# Patient Record
Sex: MALE | Race: WHITE | Employment: FULL TIME | URBAN - METROPOLITAN AREA
[De-identification: names, ages, dates, MRNs, and addresses within clinical notes are randomized per-mention and may not be internally consistent; named-entity substitution may affect disease eponyms.]

---

## 2023-06-08 ENCOUNTER — HOSPITAL ENCOUNTER (EMERGENCY)
Age: 48
Discharge: HOME | End: 2023-06-08
Attending: EMERGENCY MEDICINE
Payer: COMMERCIAL

## 2023-06-08 ENCOUNTER — APPOINTMENT (OUTPATIENT)
Dept: GENERAL RADIOLOGY | Age: 48
End: 2023-06-08
Payer: COMMERCIAL

## 2023-06-08 VITALS
SYSTOLIC BLOOD PRESSURE: 140 MMHG | HEART RATE: 80 BPM | OXYGEN SATURATION: 97 % | RESPIRATION RATE: 22 BRPM | TEMPERATURE: 98.6 F | BODY MASS INDEX: 30.36 KG/M2 | HEIGHT: 66 IN | WEIGHT: 188.93 LBS | DIASTOLIC BLOOD PRESSURE: 89 MMHG

## 2023-06-08 DIAGNOSIS — R73.9 HYPERGLYCEMIA: Primary | ICD-10-CM

## 2023-06-08 DIAGNOSIS — R42 LIGHTHEADEDNESS: ICD-10-CM

## 2023-06-08 LAB
ALBUMIN SERPL-MCNC: 4.3 G/DL (ref 3.4–5)
ALBUMIN/GLOB SERPL: 1.3 {RATIO} (ref 1.1–2.2)
ALP SERPL-CCNC: 213 U/L (ref 40–129)
ALT SERPL-CCNC: 15 U/L (ref 10–40)
ANION GAP SERPL CALCULATED.3IONS-SCNC: 15 MMOL/L (ref 3–16)
AST SERPL-CCNC: 10 U/L (ref 15–37)
BASE EXCESS BLDV CALC-SCNC: 3.7 MMOL/L (ref -3–3)
BASOPHILS # BLD: 0 K/UL (ref 0–0.2)
BASOPHILS NFR BLD: 0.4 %
BILIRUB SERPL-MCNC: 0.3 MG/DL (ref 0–1)
BILIRUB UR QL STRIP.AUTO: NEGATIVE
BUN SERPL-MCNC: 13 MG/DL (ref 7–20)
CALCIUM SERPL-MCNC: 9 MG/DL (ref 8.3–10.6)
CHLORIDE SERPL-SCNC: 97 MMOL/L (ref 99–110)
CHP ED QC CHECK: YES
CLARITY UR: CLEAR
CO2 BLDV-SCNC: 31 MMOL/L
CO2 SERPL-SCNC: 26 MMOL/L (ref 21–32)
COLOR UR: YELLOW
CREAT SERPL-MCNC: 0.7 MG/DL (ref 0.9–1.3)
DEPRECATED RDW RBC AUTO: 13.3 % (ref 12.4–15.4)
EKG ATRIAL RATE: 74 BPM
EKG DIAGNOSIS: NORMAL
EKG P AXIS: 26 DEGREES
EKG P-R INTERVAL: 142 MS
EKG Q-T INTERVAL: 402 MS
EKG QRS DURATION: 90 MS
EKG QTC CALCULATION (BAZETT): 446 MS
EKG R AXIS: -27 DEGREES
EKG T AXIS: 16 DEGREES
EKG VENTRICULAR RATE: 74 BPM
EOSINOPHIL # BLD: 0.1 K/UL (ref 0–0.6)
EOSINOPHIL NFR BLD: 1.2 %
GFR SERPLBLD CREATININE-BSD FMLA CKD-EPI: >60 ML/MIN/{1.73_M2}
GLUCOSE BLD-MCNC: 200 MG/DL
GLUCOSE BLD-MCNC: 200 MG/DL (ref 70–99)
GLUCOSE BLD-MCNC: 289 MG/DL (ref 70–99)
GLUCOSE SERPL-MCNC: 315 MG/DL (ref 70–99)
GLUCOSE UR STRIP.AUTO-MCNC: >=1000 MG/DL
HCO3 BLDV-SCNC: 29.1 MMOL/L (ref 23–29)
HCT VFR BLD AUTO: 44.3 % (ref 40.5–52.5)
HGB BLD-MCNC: 15.4 G/DL (ref 13.5–17.5)
HGB UR QL STRIP.AUTO: NEGATIVE
KETONES UR STRIP.AUTO-MCNC: 15 MG/DL
LEUKOCYTE ESTERASE UR QL STRIP.AUTO: NEGATIVE
LIPASE SERPL-CCNC: 25 U/L (ref 13–60)
LYMPHOCYTES # BLD: 2.7 K/UL (ref 1–5.1)
LYMPHOCYTES NFR BLD: 24.7 %
MCH RBC QN AUTO: 30.1 PG (ref 26–34)
MCHC RBC AUTO-ENTMCNC: 34.8 G/DL (ref 31–36)
MCV RBC AUTO: 86.3 FL (ref 80–100)
MONOCYTES # BLD: 0.7 K/UL (ref 0–1.3)
MONOCYTES NFR BLD: 6.7 %
NEUTROPHILS # BLD: 7.3 K/UL (ref 1.7–7.7)
NEUTROPHILS NFR BLD: 67 %
NITRITE UR QL STRIP.AUTO: NEGATIVE
O2 THERAPY: ABNORMAL
PCO2 BLDV: 51.4 MMHG (ref 40–50)
PERFORMED ON: ABNORMAL
PERFORMED ON: ABNORMAL
PH BLDV: 7.36 [PH] (ref 7.35–7.45)
PH UR STRIP.AUTO: 6 [PH] (ref 5–8)
PLATELET # BLD AUTO: 227 K/UL (ref 135–450)
PMV BLD AUTO: 9.2 FL (ref 5–10.5)
PO2 BLDV: 36.3 MMHG (ref 25–40)
POTASSIUM SERPL-SCNC: 3.6 MMOL/L (ref 3.5–5.1)
PROT SERPL-MCNC: 7.6 G/DL (ref 6.4–8.2)
PROT UR STRIP.AUTO-MCNC: NEGATIVE MG/DL
RBC # BLD AUTO: 5.13 M/UL (ref 4.2–5.9)
SAO2 % BLDV: 66 %
SODIUM SERPL-SCNC: 138 MMOL/L (ref 136–145)
SP GR UR STRIP.AUTO: 1.02 (ref 1–1.03)
TROPONIN, HIGH SENSITIVITY: <6 NG/L (ref 0–22)
UA COMPLETE W REFLEX CULTURE PNL UR: ABNORMAL
UA DIPSTICK W REFLEX MICRO PNL UR: ABNORMAL
URN SPEC COLLECT METH UR: ABNORMAL
UROBILINOGEN UR STRIP-ACNC: 4 E.U./DL
WBC # BLD AUTO: 10.8 K/UL (ref 4–11)

## 2023-06-08 PROCEDURE — 81003 URINALYSIS AUTO W/O SCOPE: CPT

## 2023-06-08 PROCEDURE — 36415 COLL VENOUS BLD VENIPUNCTURE: CPT

## 2023-06-08 PROCEDURE — 2580000003 HC RX 258: Performed by: EMERGENCY MEDICINE

## 2023-06-08 PROCEDURE — 93010 ELECTROCARDIOGRAM REPORT: CPT | Performed by: INTERNAL MEDICINE

## 2023-06-08 PROCEDURE — 80053 COMPREHEN METABOLIC PANEL: CPT

## 2023-06-08 PROCEDURE — 85025 COMPLETE CBC W/AUTO DIFF WBC: CPT

## 2023-06-08 PROCEDURE — 93005 ELECTROCARDIOGRAM TRACING: CPT | Performed by: EMERGENCY MEDICINE

## 2023-06-08 PROCEDURE — 6370000000 HC RX 637 (ALT 250 FOR IP): Performed by: EMERGENCY MEDICINE

## 2023-06-08 PROCEDURE — 84484 ASSAY OF TROPONIN QUANT: CPT

## 2023-06-08 PROCEDURE — 82803 BLOOD GASES ANY COMBINATION: CPT

## 2023-06-08 PROCEDURE — 83690 ASSAY OF LIPASE: CPT

## 2023-06-08 PROCEDURE — 71045 X-RAY EXAM CHEST 1 VIEW: CPT

## 2023-06-08 RX ORDER — 0.9 % SODIUM CHLORIDE 0.9 %
1000 INTRAVENOUS SOLUTION INTRAVENOUS ONCE
Status: COMPLETED | OUTPATIENT
Start: 2023-06-08 | End: 2023-06-08

## 2023-06-08 RX ORDER — 0.9 % SODIUM CHLORIDE 0.9 %
500 INTRAVENOUS SOLUTION INTRAVENOUS ONCE
Status: COMPLETED | OUTPATIENT
Start: 2023-06-08 | End: 2023-06-08

## 2023-06-08 RX ADMIN — INSULIN HUMAN 10 UNITS: 100 INJECTION, SOLUTION PARENTERAL at 12:21

## 2023-06-08 RX ADMIN — SODIUM CHLORIDE 500 ML: 9 INJECTION, SOLUTION INTRAVENOUS at 12:21

## 2023-06-08 RX ADMIN — SODIUM CHLORIDE 1000 ML: 9 INJECTION, SOLUTION INTRAVENOUS at 11:36

## 2023-06-08 ASSESSMENT — ENCOUNTER SYMPTOMS
VOMITING: 0
BLOOD IN STOOL: 0
BACK PAIN: 0
ABDOMINAL PAIN: 0
TROUBLE SWALLOWING: 0
STRIDOR: 0
WHEEZING: 0
NAUSEA: 0
SHORTNESS OF BREATH: 0
VOICE CHANGE: 0
PHOTOPHOBIA: 0
FACIAL SWELLING: 0
COLOR CHANGE: 0

## 2023-06-08 ASSESSMENT — PAIN SCALES - GENERAL: PAINLEVEL_OUTOF10: 0

## 2023-06-08 ASSESSMENT — PAIN - FUNCTIONAL ASSESSMENT: PAIN_FUNCTIONAL_ASSESSMENT: NONE - DENIES PAIN

## 2023-06-08 NOTE — ED TRIAGE NOTES
Patient presents to ED complaining of intermittent room spinning sensation x3 days. Denies numbness or weakness to extremities, denies known cardiac hx. Reports a couple of instances of feeling his heart racing over the past several months. Denies chest pain. Patient also concerned for high blood glucose, denies personal hx of diabetes, states he did check his glucose with a coworkers glucometer today and states it read over 300. Denies having anything but water since last night. NIHSS = 0 on arrival.    Patient resting on bed, respirations even and easy at this time. No obvious distress.

## 2023-06-08 NOTE — ED PROVIDER NOTES
symptoms. Specific return precautions given for any neurologic deficits such as numbness weakness or difficulty walking. Patient expresses understanding and agreement with this plan and is discharged home. FINAL IMPRESSION      1. Hyperglycemia    2. Lightheadedness          DISPOSITION/PLAN     DISPOSITION Decision To Discharge 06/08/2023 01:59:03 PM      PATIENT REFERRED TO:  Kianna Mcmillan  [phone]  In 3 days  Ask for an appointment with a primary care doctor    [place] [address]  [phone]    If symptoms worsen      DISCHARGE MEDICATIONS:  Discharge Medication List as of 6/8/2023  2:16 PM        START taking these medications    Details   metFORMIN (GLUCOPHAGE) 500 MG tablet Take 1 tablet by mouth daily (with breakfast), Disp-14 tablet, R-0Normal                    (Please note that portions of this note were completed with a voice recognition program.  Efforts were made to edit the dictations but occasionally words are mis-transcribed. )    Dann Ledesma MD (electronically signed)  Attending Emergency Physician           Dann Ledesma MD  06/08/23 [id number]

## 2023-06-08 NOTE — ED NOTES
Patient ambulatory from ED. AVS provided and discussed with patient. All questions answered. Patient verbalizes understanding of discharge instructions. Respirations even and easy. No obvious distress at this time.      Netta Mullen RN  06/08/23 [id number]

## 2024-07-14 ENCOUNTER — HOSPITAL ENCOUNTER (INPATIENT)
Age: 49
LOS: 1 days | Discharge: ANOTHER HOSPITAL | DRG: 251 | End: 2024-07-15
Attending: STUDENT IN AN ORGANIZED HEALTH CARE EDUCATION/TRAINING PROGRAM | Admitting: STUDENT IN AN ORGANIZED HEALTH CARE EDUCATION/TRAINING PROGRAM
Payer: COMMERCIAL

## 2024-07-14 ENCOUNTER — APPOINTMENT (OUTPATIENT)
Dept: GENERAL RADIOLOGY | Age: 49
DRG: 251 | End: 2024-07-14
Payer: COMMERCIAL

## 2024-07-14 DIAGNOSIS — R73.9 HYPERGLYCEMIA: ICD-10-CM

## 2024-07-14 DIAGNOSIS — R07.9 CHEST PAIN, UNSPECIFIED TYPE: Primary | ICD-10-CM

## 2024-07-14 DIAGNOSIS — I21.4 NSTEMI (NON-ST ELEVATED MYOCARDIAL INFARCTION) (HCC): ICD-10-CM

## 2024-07-14 DIAGNOSIS — R79.89 ELEVATED TROPONIN: ICD-10-CM

## 2024-07-14 LAB
ALBUMIN SERPL-MCNC: 4.6 G/DL (ref 3.4–5)
ALBUMIN/GLOB SERPL: 1.5 {RATIO} (ref 1.1–2.2)
ALP SERPL-CCNC: 182 U/L (ref 40–129)
ALT SERPL-CCNC: 15 U/L (ref 10–40)
ANION GAP SERPL CALCULATED.3IONS-SCNC: 16 MMOL/L (ref 3–16)
APTT BLD: 24.2 SEC (ref 22.1–36.4)
AST SERPL-CCNC: 12 U/L (ref 15–37)
BASOPHILS # BLD: 0 K/UL (ref 0–0.2)
BASOPHILS NFR BLD: 0.4 %
BILIRUB SERPL-MCNC: 0.4 MG/DL (ref 0–1)
BUN SERPL-MCNC: 17 MG/DL (ref 7–20)
CALCIUM SERPL-MCNC: 9.6 MG/DL (ref 8.3–10.6)
CHLORIDE SERPL-SCNC: 96 MMOL/L (ref 99–110)
CO2 SERPL-SCNC: 23 MMOL/L (ref 21–32)
CREAT SERPL-MCNC: 0.9 MG/DL (ref 0.9–1.3)
D-DIMER QUANTITATIVE: 0.28 UG/ML FEU (ref 0–0.6)
DEPRECATED RDW RBC AUTO: 13.7 % (ref 12.4–15.4)
EOSINOPHIL # BLD: 0.1 K/UL (ref 0–0.6)
EOSINOPHIL NFR BLD: 0.4 %
GFR SERPLBLD CREATININE-BSD FMLA CKD-EPI: >90 ML/MIN/{1.73_M2}
GLUCOSE BLD-MCNC: 300 MG/DL (ref 70–99)
GLUCOSE SERPL-MCNC: 380 MG/DL (ref 70–99)
HCT VFR BLD AUTO: 45.8 % (ref 40.5–52.5)
HGB BLD-MCNC: 15.7 G/DL (ref 13.5–17.5)
LYMPHOCYTES # BLD: 2.3 K/UL (ref 1–5.1)
LYMPHOCYTES NFR BLD: 20.3 %
MCH RBC QN AUTO: 29.3 PG (ref 26–34)
MCHC RBC AUTO-ENTMCNC: 34.3 G/DL (ref 31–36)
MCV RBC AUTO: 85.6 FL (ref 80–100)
MONOCYTES # BLD: 0.7 K/UL (ref 0–1.3)
MONOCYTES NFR BLD: 6.1 %
NEUTROPHILS # BLD: 8.4 K/UL (ref 1.7–7.7)
NEUTROPHILS NFR BLD: 72.8 %
PERFORMED ON: ABNORMAL
PLATELET # BLD AUTO: 228 K/UL (ref 135–450)
PMV BLD AUTO: 9.8 FL (ref 5–10.5)
POTASSIUM SERPL-SCNC: 4.7 MMOL/L (ref 3.5–5.1)
PROT SERPL-MCNC: 7.7 G/DL (ref 6.4–8.2)
RBC # BLD AUTO: 5.36 M/UL (ref 4.2–5.9)
SODIUM SERPL-SCNC: 135 MMOL/L (ref 136–145)
TROPONIN, HIGH SENSITIVITY: 20 NG/L (ref 0–22)
TROPONIN, HIGH SENSITIVITY: 26 NG/L (ref 0–22)
TROPONIN, HIGH SENSITIVITY: 31 NG/L (ref 0–22)
TROPONIN, HIGH SENSITIVITY: 49 NG/L (ref 0–22)
TROPONIN, HIGH SENSITIVITY: 74 NG/L (ref 0–22)
WBC # BLD AUTO: 11.5 K/UL (ref 4–11)

## 2024-07-14 PROCEDURE — 6360000002 HC RX W HCPCS: Performed by: PHYSICIAN ASSISTANT

## 2024-07-14 PROCEDURE — 02703ZZ DILATION OF CORONARY ARTERY, ONE ARTERY, PERCUTANEOUS APPROACH: ICD-10-PCS | Performed by: STUDENT IN AN ORGANIZED HEALTH CARE EDUCATION/TRAINING PROGRAM

## 2024-07-14 PROCEDURE — 93005 ELECTROCARDIOGRAM TRACING: CPT | Performed by: STUDENT IN AN ORGANIZED HEALTH CARE EDUCATION/TRAINING PROGRAM

## 2024-07-14 PROCEDURE — 71045 X-RAY EXAM CHEST 1 VIEW: CPT

## 2024-07-14 PROCEDURE — 99285 EMERGENCY DEPT VISIT HI MDM: CPT

## 2024-07-14 PROCEDURE — 6370000000 HC RX 637 (ALT 250 FOR IP): Performed by: STUDENT IN AN ORGANIZED HEALTH CARE EDUCATION/TRAINING PROGRAM

## 2024-07-14 PROCEDURE — 84484 ASSAY OF TROPONIN QUANT: CPT

## 2024-07-14 PROCEDURE — 80053 COMPREHEN METABOLIC PANEL: CPT

## 2024-07-14 PROCEDURE — 93005 ELECTROCARDIOGRAM TRACING: CPT | Performed by: PHYSICIAN ASSISTANT

## 2024-07-14 PROCEDURE — 85730 THROMBOPLASTIN TIME PARTIAL: CPT

## 2024-07-14 PROCEDURE — B2111ZZ FLUOROSCOPY OF MULTIPLE CORONARY ARTERIES USING LOW OSMOLAR CONTRAST: ICD-10-PCS | Performed by: STUDENT IN AN ORGANIZED HEALTH CARE EDUCATION/TRAINING PROGRAM

## 2024-07-14 PROCEDURE — 4A023N7 MEASUREMENT OF CARDIAC SAMPLING AND PRESSURE, LEFT HEART, PERCUTANEOUS APPROACH: ICD-10-PCS | Performed by: STUDENT IN AN ORGANIZED HEALTH CARE EDUCATION/TRAINING PROGRAM

## 2024-07-14 PROCEDURE — B2151ZZ FLUOROSCOPY OF LEFT HEART USING LOW OSMOLAR CONTRAST: ICD-10-PCS | Performed by: STUDENT IN AN ORGANIZED HEALTH CARE EDUCATION/TRAINING PROGRAM

## 2024-07-14 PROCEDURE — 85379 FIBRIN DEGRADATION QUANT: CPT

## 2024-07-14 PROCEDURE — 85025 COMPLETE CBC W/AUTO DIFF WBC: CPT

## 2024-07-14 PROCEDURE — 2060000000 HC ICU INTERMEDIATE R&B

## 2024-07-14 PROCEDURE — 96374 THER/PROPH/DIAG INJ IV PUSH: CPT

## 2024-07-14 PROCEDURE — 36415 COLL VENOUS BLD VENIPUNCTURE: CPT

## 2024-07-14 RX ORDER — POTASSIUM CHLORIDE 20 MEQ/1
40 TABLET, EXTENDED RELEASE ORAL PRN
Status: DISCONTINUED | OUTPATIENT
Start: 2024-07-14 | End: 2024-07-15 | Stop reason: HOSPADM

## 2024-07-14 RX ORDER — HEPARIN SODIUM 1000 [USP'U]/ML
2000 INJECTION, SOLUTION INTRAVENOUS; SUBCUTANEOUS PRN
Status: DISCONTINUED | OUTPATIENT
Start: 2024-07-14 | End: 2024-07-14 | Stop reason: CLARIF

## 2024-07-14 RX ORDER — EMPAGLIFLOZIN 10 MG/1
10 TABLET, FILM COATED ORAL EVERY MORNING
Status: ON HOLD | COMMUNITY

## 2024-07-14 RX ORDER — ATORVASTATIN CALCIUM 80 MG/1
80 TABLET, FILM COATED ORAL DAILY
Status: DISCONTINUED | OUTPATIENT
Start: 2024-07-14 | End: 2024-07-15 | Stop reason: HOSPADM

## 2024-07-14 RX ORDER — ACETAMINOPHEN 325 MG/1
650 TABLET ORAL EVERY 6 HOURS PRN
Status: DISCONTINUED | OUTPATIENT
Start: 2024-07-14 | End: 2024-07-15 | Stop reason: HOSPADM

## 2024-07-14 RX ORDER — POLYETHYLENE GLYCOL 3350 17 G/17G
17 POWDER, FOR SOLUTION ORAL DAILY PRN
Status: DISCONTINUED | OUTPATIENT
Start: 2024-07-14 | End: 2024-07-15 | Stop reason: HOSPADM

## 2024-07-14 RX ORDER — ASPIRIN 81 MG/1
81 TABLET, CHEWABLE ORAL DAILY
Status: DISCONTINUED | OUTPATIENT
Start: 2024-07-15 | End: 2024-07-15 | Stop reason: HOSPADM

## 2024-07-14 RX ORDER — HEPARIN SODIUM 1000 [USP'U]/ML
4000 INJECTION, SOLUTION INTRAVENOUS; SUBCUTANEOUS PRN
Status: DISCONTINUED | OUTPATIENT
Start: 2024-07-14 | End: 2024-07-14 | Stop reason: CLARIF

## 2024-07-14 RX ORDER — HEPARIN SODIUM 10000 [USP'U]/100ML
0-4000 INJECTION, SOLUTION INTRAVENOUS CONTINUOUS
Status: DISCONTINUED | OUTPATIENT
Start: 2024-07-14 | End: 2024-07-15 | Stop reason: HOSPADM

## 2024-07-14 RX ORDER — HEPARIN SODIUM 1000 [USP'U]/ML
4000 INJECTION, SOLUTION INTRAVENOUS; SUBCUTANEOUS ONCE
Status: COMPLETED | OUTPATIENT
Start: 2024-07-14 | End: 2024-07-14

## 2024-07-14 RX ORDER — DEXTROSE MONOHYDRATE 100 MG/ML
INJECTION, SOLUTION INTRAVENOUS CONTINUOUS PRN
Status: DISCONTINUED | OUTPATIENT
Start: 2024-07-14 | End: 2024-07-15 | Stop reason: HOSPADM

## 2024-07-14 RX ORDER — ACETAMINOPHEN 650 MG/1
650 SUPPOSITORY RECTAL EVERY 6 HOURS PRN
Status: DISCONTINUED | OUTPATIENT
Start: 2024-07-14 | End: 2024-07-15 | Stop reason: HOSPADM

## 2024-07-14 RX ORDER — INSULIN LISPRO 100 [IU]/ML
0-4 INJECTION, SOLUTION INTRAVENOUS; SUBCUTANEOUS NIGHTLY
Status: DISCONTINUED | OUTPATIENT
Start: 2024-07-14 | End: 2024-07-15 | Stop reason: HOSPADM

## 2024-07-14 RX ORDER — ATORVASTATIN CALCIUM 80 MG/1
1 TABLET, FILM COATED ORAL DAILY
Status: ON HOLD | COMMUNITY
Start: 2024-04-15

## 2024-07-14 RX ORDER — SODIUM CHLORIDE 9 MG/ML
INJECTION, SOLUTION INTRAVENOUS PRN
Status: DISCONTINUED | OUTPATIENT
Start: 2024-07-14 | End: 2024-07-15 | Stop reason: HOSPADM

## 2024-07-14 RX ORDER — INSULIN LISPRO 100 [IU]/ML
0-4 INJECTION, SOLUTION INTRAVENOUS; SUBCUTANEOUS
Status: DISCONTINUED | OUTPATIENT
Start: 2024-07-15 | End: 2024-07-15 | Stop reason: HOSPADM

## 2024-07-14 RX ORDER — ONDANSETRON 2 MG/ML
4 INJECTION INTRAMUSCULAR; INTRAVENOUS EVERY 6 HOURS PRN
Status: DISCONTINUED | OUTPATIENT
Start: 2024-07-14 | End: 2024-07-15 | Stop reason: HOSPADM

## 2024-07-14 RX ORDER — MAGNESIUM SULFATE IN WATER 40 MG/ML
2000 INJECTION, SOLUTION INTRAVENOUS PRN
Status: DISCONTINUED | OUTPATIENT
Start: 2024-07-14 | End: 2024-07-15 | Stop reason: HOSPADM

## 2024-07-14 RX ORDER — SODIUM CHLORIDE 0.9 % (FLUSH) 0.9 %
5-40 SYRINGE (ML) INJECTION EVERY 12 HOURS SCHEDULED
Status: DISCONTINUED | OUTPATIENT
Start: 2024-07-14 | End: 2024-07-15 | Stop reason: HOSPADM

## 2024-07-14 RX ORDER — ONDANSETRON 4 MG/1
4 TABLET, ORALLY DISINTEGRATING ORAL EVERY 8 HOURS PRN
Status: DISCONTINUED | OUTPATIENT
Start: 2024-07-14 | End: 2024-07-15 | Stop reason: HOSPADM

## 2024-07-14 RX ORDER — GLUCAGON 1 MG/ML
1 KIT INJECTION PRN
Status: DISCONTINUED | OUTPATIENT
Start: 2024-07-14 | End: 2024-07-15 | Stop reason: HOSPADM

## 2024-07-14 RX ORDER — SODIUM CHLORIDE 0.9 % (FLUSH) 0.9 %
5-40 SYRINGE (ML) INJECTION PRN
Status: DISCONTINUED | OUTPATIENT
Start: 2024-07-14 | End: 2024-07-15 | Stop reason: HOSPADM

## 2024-07-14 RX ORDER — POTASSIUM CHLORIDE 7.45 MG/ML
10 INJECTION INTRAVENOUS PRN
Status: DISCONTINUED | OUTPATIENT
Start: 2024-07-14 | End: 2024-07-15 | Stop reason: HOSPADM

## 2024-07-14 RX ADMIN — INSULIN LISPRO 4 UNITS: 100 INJECTION, SOLUTION INTRAVENOUS; SUBCUTANEOUS at 21:44

## 2024-07-14 RX ADMIN — HEPARIN SODIUM 980 UNITS/HR: 10000 INJECTION, SOLUTION INTRAVENOUS at 18:44

## 2024-07-14 RX ADMIN — HEPARIN SODIUM 4000 UNITS: 1000 INJECTION INTRAVENOUS; SUBCUTANEOUS at 18:41

## 2024-07-14 RX ADMIN — ATORVASTATIN CALCIUM 80 MG: 80 TABLET, FILM COATED ORAL at 21:44

## 2024-07-14 ASSESSMENT — PAIN - FUNCTIONAL ASSESSMENT: PAIN_FUNCTIONAL_ASSESSMENT: 0-10

## 2024-07-14 ASSESSMENT — PAIN SCALES - GENERAL
PAINLEVEL_OUTOF10: 3
PAINLEVEL_OUTOF10: 5

## 2024-07-14 ASSESSMENT — HEART SCORE: ECG: NON-SPECIFC REPOLARIZATION DISTURBANCE/LBTB/PM

## 2024-07-14 ASSESSMENT — PAIN DESCRIPTION - LOCATION
LOCATION: CHEST
LOCATION: CHEST

## 2024-07-14 ASSESSMENT — LIFESTYLE VARIABLES
HOW MANY STANDARD DRINKS CONTAINING ALCOHOL DO YOU HAVE ON A TYPICAL DAY: PATIENT DOES NOT DRINK
HOW OFTEN DO YOU HAVE A DRINK CONTAINING ALCOHOL: NEVER

## 2024-07-14 ASSESSMENT — PAIN DESCRIPTION - DESCRIPTORS
DESCRIPTORS: BURNING
DESCRIPTORS: SORE

## 2024-07-14 NOTE — ED NOTES
Pt to ED via [facility] from work c/o chest pain and \" burning sensation in my lungs\" since 0900. EMS gave 324mg of Asprin en route. Pt rates pain 5/10. VSS, afebrile. Pt is alert and orient to person, place, time and situation. EKG completed at this time.

## 2024-07-14 NOTE — H&P
V2.0  History and Physical      Name:  Landon Ordoñez /Age/Sex: 1975  (49 y.o. male)   MRN & CSN:  [id number] & [id number] Encounter Date/Time: 2024 7:27 PM EDT   Location:  [address] [place] [address] PCP: No primary care provider on file.       Hospital Day: 1    History from:     patient    History of Present Illness:     Chief Complaint: NSTEMI (non-ST elevated myocardial infarction) (HCC)    Landon Ordoñez is a 49 y.o. male with pmh of hyperlipidemia and type 2 diabetes who presents with chest pain.  Patient states that it feels like his lungs are burning and also does radiate to both arms.  Patient stated this started approximately 9 AM this morning and to get progressively worse thus his presentation.  On my evaluation the pain was still present.  Patient did have an EKG that had some possible T wave changes and an uptrending troponin.  He started on an hyperlipidemia    Assessment and Plan:   Landon Ordoñez is a 49 y.o. male  who presents with NSTEMI (non-ST elevated myocardial infarction) (HCC)    NSTEMI  No acute ST changes on admission.  Troponins uptrending  Cardiology consulted  IV heparin  Aspirin statin  Continue to trend troponins    Hyperlipidemia  Continue high-dose statin    Type 2 diabetes  Low-dose sliding  Hypoglycemia protocol  Blood care glucose check  Will update A1c    Disposition:   Current Living situation: Home  Expected Disposition: Home  Estimated D/C: 2 to 3 days    Diet No diet orders on file   DVT Prophylaxis [] Lovenox, [x]  Heparin, [] SCDs, [] Ambulation,  [] Eliquis, [] Xarelto   Code Status No Order   Surrogate Decision Maker/ POA      Personally reviewed Lab Studies and Imaging     Discussed management of the case with ER provider who recommended admission, cardiology evaluation, and heparin    EKG interpreted personally and results possible T wave depression.  No acute elevation consistent with STEMI      Drugs that require monitoring for toxicity include heparin and the method of

## 2024-07-14 NOTE — PROGRESS NOTES
Clinical Pharmacy Note  Heparin Dosing Consult    Landon Ordoñez is a 49 y.o. male ordered heparin per CAD/STEMI/NSTEMI/UA/AFIB nomogram by VALENTIN Doherty PA-C.     No results found for: \"ANTIXAUHEP\", \"LABHEPA\"   Lab Results   Component Value Date/Time    HGB 15.7 07/14/2024 04:27 PM    HCT 45.8 07/14/2024 04:27 PM     07/14/2024 04:27 PM       Ht Readings from Last 1 Encounters:   06/08/23 1.676 m (5' 6\")        Wt Readings from Last 1 Encounters:   07/14/24 81.9 kg (180 lb 8.9 oz)        Assessment/Plan:  Initial bolus: 4000 units  Initial infusion rate: 980 units/hr  Next anti-Xa:: 0100 7/15/24    Pharmacy will continue to monitor adjust heparin based on anti-Xa results using nomogram below:     CAD/STEMI/NSTEMI/UA/AFIB Heparin Nomogram     Initial Bolus: 60 units/kg Max Bolus: 4,000 units       Initial Rate: 12 units/kg/hr Max Initial Rate: 1,000 units/hr     anti-Xa Bolus Titration   < 0.1 Heparin 60 units/kg bolus Increase drip by 4 units/kg/hr   0.1 - 0.29 Heparin 30 units/kg bolus Increase drip by 2 units/kg/hr   0.3 - 0.7 No Bolus No Change   0.71 - 0.8 No Bolus Decrease drip by 1 units/kg/hr   0.81 - 0.99 No Bolus Decrease drip by 2 units/kg/hr   > 1 Hold Heparin for 1 hour Decrease drip by 3 units/kg/hr     Obtain anti-Xa 6 hours after initial bolus and 6 hours after any dose change until two consecutive therapeutic anti-Xa levels are achieved - then daily.

## 2024-07-14 NOTE — ED PROVIDER NOTES
[facility] EMERGENCY DEPARTMENT  EMERGENCY DEPARTMENT ENCOUNTER        Pt Name: Landon Ordoñez  MRN: [id number]  Birthdate 1975  Date of evaluation: 7/14/2024  Provider: Landon Doherty PA-C  PCP: No primary care provider on file.  Note Started: 4:35 PM EDT 7/14/24      JUSTIN. I have evaluated this patient.        CHIEF COMPLAINT       Chief Complaint   Patient presents with    Chest Pain     Pt to ED via [facility] from work c/o chest pain and \" burning sensation in my lungs\" since 0900. EMS gave 324mg of Asprin en route. Pt rates pain 5/10       HISTORY OF PRESENT ILLNESS: 1 or more Elements     History From: pt    Landon Ordoñez is a 49 y.o. male with past medical history of hyperlipidemia, diabetes who presents complaining of chest pain, \"lungs burning \".  Patient states around 9 AM this morning started feeling pain in his upper chest that he described as his lungs burning.  Pain is constant, worse with breathing, nonradiating.  Denies any trauma, fever, cough, back pain, shortness of breath, abdominal pain, vomiting, lower extremity edema, dizziness, syncope.  No prior stress test.  Denies smoking.    Nursing Notes were all reviewed and agreed with or any disagreements were addressed in the HPI.    REVIEW OF SYSTEMS :      Review of Systems   All other systems reviewed and are negative.      Positives and Pertinent negatives as per HPI.       PAST MEDICAL HISTORY    has no past medical history on file.     SURGICAL HISTORY   History reviewed. No pertinent surgical history.    CURRENTMEDICATIONS       Previous Medications    ATORVASTATIN (LIPITOR) 80 MG TABLET    Take 1 tablet by mouth daily    JARDIANCE 10 MG TABLET    Take 1 tablet by mouth every morning       ALLERGIES     Patient has no known allergies.    FAMILYHISTORY       Family History   Problem Relation Age of Onset    Heart Disease Mother     Diabetes Mother     Heart Disease Father     Diabetes Father         SOCIAL

## 2024-07-14 NOTE — ED NOTES
Pt resting in bed at this time, laying in a supine position with head of bed elevated . Call light remains in reach instructed pt how to use, and encouraged pt to call if needed assistance, no distress noted. RR even and unlabored, skin warm and dry. Side rails up x's 2, bed in lowest position  No needs at this time. Will continue to monitor closely.

## 2024-07-14 NOTE — ED NOTES
Pt resting in bed at this time, laying in a supine position with head of bed elevated . Call light remains in reach instructed pt how to use, and encouraged pt to call if needed assistance, no distress noted. RR even and unlabored, skin warm and dry. No needs at this time.

## 2024-07-15 ENCOUNTER — APPOINTMENT (OUTPATIENT)
Age: 49
DRG: 251 | End: 2024-07-15
Attending: STUDENT IN AN ORGANIZED HEALTH CARE EDUCATION/TRAINING PROGRAM
Payer: COMMERCIAL

## 2024-07-15 ENCOUNTER — APPOINTMENT (OUTPATIENT)
Dept: VASCULAR LAB | Age: 49
DRG: 231 | End: 2024-07-15
Attending: INTERNAL MEDICINE
Payer: COMMERCIAL

## 2024-07-15 ENCOUNTER — APPOINTMENT (OUTPATIENT)
Dept: CT IMAGING | Age: 49
DRG: 231 | End: 2024-07-15
Attending: INTERNAL MEDICINE
Payer: COMMERCIAL

## 2024-07-15 ENCOUNTER — HOSPITAL ENCOUNTER (INPATIENT)
Age: 49
LOS: 8 days | Discharge: HOME HEALTH | DRG: 231 | End: 2024-07-23
Attending: INTERNAL MEDICINE | Admitting: INTERNAL MEDICINE
Payer: COMMERCIAL

## 2024-07-15 ENCOUNTER — APPOINTMENT (OUTPATIENT)
Age: 49
DRG: 231 | End: 2024-07-15
Attending: STUDENT IN AN ORGANIZED HEALTH CARE EDUCATION/TRAINING PROGRAM
Payer: COMMERCIAL

## 2024-07-15 VITALS
RESPIRATION RATE: 24 BRPM | TEMPERATURE: 98.2 F | HEIGHT: 66 IN | BODY MASS INDEX: 28.45 KG/M2 | DIASTOLIC BLOOD PRESSURE: 77 MMHG | SYSTOLIC BLOOD PRESSURE: 112 MMHG | WEIGHT: 177.03 LBS | OXYGEN SATURATION: 96 % | HEART RATE: 56 BPM

## 2024-07-15 DIAGNOSIS — Z95.1 S/P CABG X 4: Primary | ICD-10-CM

## 2024-07-15 DIAGNOSIS — I25.10 CORONARY ARTERY DISEASE, UNSPECIFIED VESSEL OR LESION TYPE, UNSPECIFIED WHETHER ANGINA PRESENT, UNSPECIFIED WHETHER NATIVE OR TRANSPLANTED HEART: ICD-10-CM

## 2024-07-15 DIAGNOSIS — E78.5 DYSLIPIDEMIA: ICD-10-CM

## 2024-07-15 DIAGNOSIS — E11.65 TYPE 2 DIABETES MELLITUS WITH HYPERGLYCEMIA, UNSPECIFIED WHETHER LONG TERM INSULIN USE (HCC): ICD-10-CM

## 2024-07-15 DIAGNOSIS — I21.4 NSTEMI (NON-ST ELEVATED MYOCARDIAL INFARCTION) (HCC): ICD-10-CM

## 2024-07-15 DIAGNOSIS — I25.10 CAD, MULTIPLE VESSEL: ICD-10-CM

## 2024-07-15 DIAGNOSIS — I24.9 ACUTE CORONARY SYNDROME (HCC): ICD-10-CM

## 2024-07-15 PROBLEM — R07.9 CHEST PAIN: Status: ACTIVE | Noted: 2024-07-15

## 2024-07-15 LAB
ANION GAP SERPL CALCULATED.3IONS-SCNC: 13 MMOL/L (ref 3–16)
ANTI-XA UNFRAC HEPARIN: 0.19 IU/ML (ref 0.3–0.7)
ANTI-XA UNFRAC HEPARIN: 0.22 IU/ML (ref 0.3–0.7)
APTT BLD: 38.5 SEC (ref 22.1–36.4)
BASE EXCESS BLDA CALC-SCNC: 1 MMOL/L (ref -3–3)
BILIRUB UR QL STRIP.AUTO: NEGATIVE
BUN SERPL-MCNC: 15 MG/DL (ref 7–20)
CALCIUM SERPL-MCNC: 9.2 MG/DL (ref 8.3–10.6)
CHLORIDE SERPL-SCNC: 100 MMOL/L (ref 99–110)
CHOLEST SERPL-MCNC: 310 MG/DL (ref 0–199)
CLARITY UR: CLEAR
CO2 BLDA-SCNC: 58.5 MMOL/L
CO2 SERPL-SCNC: 24 MMOL/L (ref 21–32)
COHGB MFR BLDA: 0.9 % (ref 0–1.5)
COLOR UR: YELLOW
CREAT SERPL-MCNC: 0.7 MG/DL (ref 0.9–1.3)
DEPRECATED RDW RBC AUTO: 14.2 % (ref 12.4–15.4)
DEPRECATED RDW RBC AUTO: 14.2 % (ref 12.4–15.4)
ECHO AO ARCH DIAM: 2.4 CM
ECHO AO ASC DIAM: 2.8 CM
ECHO AO ASCENDING AORTA INDEX: 1.42 CM/M2
ECHO AO ROOT DIAM: 2.7 CM
ECHO AO ROOT DIAM: 3.1 CM
ECHO AO ROOT INDEX: 1.37 CM/M2
ECHO AO ROOT INDEX: 1.63 CM/M2
ECHO AV AREA PEAK VELOCITY: 2.7 CM2
ECHO AV AREA VTI: 2.7 CM2
ECHO AV AREA/BSA PEAK VELOCITY: 1.4 CM2/M2
ECHO AV AREA/BSA VTI: 1.4 CM2/M2
ECHO AV MEAN GRADIENT: 5 MMHG
ECHO AV MEAN VELOCITY: 1 M/S
ECHO AV PEAK GRADIENT: 9 MMHG
ECHO AV PEAK GRADIENT: 9 MMHG
ECHO AV PEAK VELOCITY: 1.5 M/S
ECHO AV PEAK VELOCITY: 1.5 M/S
ECHO AV VELOCITY RATIO: 0.87
ECHO AV VELOCITY RATIO: 0.93
ECHO AV VTI: 27.5 CM
ECHO BSA: 1.97 M2
ECHO EST RA PRESSURE: 3 MMHG
ECHO IVC PROX: 1.5 CM
ECHO LA AREA 2C: 10 CM2
ECHO LA AREA 2C: 16.7 CM2
ECHO LA AREA 4C: 12.5 CM2
ECHO LA AREA 4C: 16.7 CM2
ECHO LA DIAMETER INDEX: 1.79 CM/M2
ECHO LA DIAMETER: 3.4 CM
ECHO LA MAJOR AXIS: 4.5 CM
ECHO LA MAJOR AXIS: 5.3 CM
ECHO LA MINOR AXIS: 4 CM
ECHO LA MINOR AXIS: 5.1 CM
ECHO LA TO AORTIC ROOT RATIO: 1.1
ECHO LA VOL BP: 25 ML (ref 18–58)
ECHO LA VOL BP: 45 ML (ref 18–58)
ECHO LA VOL MOD A2C: 20 ML (ref 18–58)
ECHO LA VOL MOD A2C: 46 ML (ref 18–58)
ECHO LA VOL MOD A4C: 28 ML (ref 18–58)
ECHO LA VOL MOD A4C: 42 ML (ref 18–58)
ECHO LA VOL/BSA BIPLANE: 13 ML/M2 (ref 16–34)
ECHO LA VOL/BSA BIPLANE: 23 ML/M2 (ref 16–34)
ECHO LA VOLUME INDEX MOD A2C: 11 ML/M2 (ref 16–34)
ECHO LA VOLUME INDEX MOD A2C: 23 ML/M2 (ref 16–34)
ECHO LA VOLUME INDEX MOD A4C: 15 ML/M2 (ref 16–34)
ECHO LA VOLUME INDEX MOD A4C: 21 ML/M2 (ref 16–34)
ECHO LV E' LATERAL VELOCITY: 10 CM/S
ECHO LV E' LATERAL VELOCITY: 10 CM/S
ECHO LV E' SEPTAL VELOCITY: 10 CM/S
ECHO LV E' SEPTAL VELOCITY: 8 CM/S
ECHO LV EDV A2C: 81 ML
ECHO LV EDV A2C: 91 ML
ECHO LV EDV A4C: 89 ML
ECHO LV EDV A4C: 92 ML
ECHO LV EDV INDEX A4C: 47 ML/M2
ECHO LV EDV INDEX A4C: 47 ML/M2
ECHO LV EDV NDEX A2C: 43 ML/M2
ECHO LV EDV NDEX A2C: 46 ML/M2
ECHO LV EJECTION FRACTION A2C: 44 %
ECHO LV EJECTION FRACTION A2C: 54 %
ECHO LV EJECTION FRACTION A4C: 33 %
ECHO LV EJECTION FRACTION A4C: 46 %
ECHO LV EJECTION FRACTION BIPLANE: 41 % (ref 55–100)
ECHO LV EJECTION FRACTION BIPLANE: 48 % (ref 55–100)
ECHO LV ESV A2C: 42 ML
ECHO LV ESV A2C: 45 ML
ECHO LV ESV A4C: 50 ML
ECHO LV ESV A4C: 59 ML
ECHO LV ESV INDEX A2C: 21 ML/M2
ECHO LV ESV INDEX A2C: 24 ML/M2
ECHO LV ESV INDEX A4C: 25 ML/M2
ECHO LV ESV INDEX A4C: 31 ML/M2
ECHO LV FRACTIONAL SHORTENING: 29 % (ref 28–44)
ECHO LV FRACTIONAL SHORTENING: 38 % (ref 28–44)
ECHO LV INTERNAL DIMENSION DIASTOLE INDEX: 2.21 CM/M2
ECHO LV INTERNAL DIMENSION DIASTOLE INDEX: 2.49 CM/M2
ECHO LV INTERNAL DIMENSION DIASTOLIC: 4.2 CM (ref 4.2–5.9)
ECHO LV INTERNAL DIMENSION DIASTOLIC: 4.9 CM (ref 4.2–5.9)
ECHO LV INTERNAL DIMENSION SYSTOLIC INDEX: 1.37 CM/M2
ECHO LV INTERNAL DIMENSION SYSTOLIC INDEX: 1.78 CM/M2
ECHO LV INTERNAL DIMENSION SYSTOLIC: 2.6 CM
ECHO LV INTERNAL DIMENSION SYSTOLIC: 3.5 CM
ECHO LV IVSD: 0.9 CM (ref 0.6–1)
ECHO LV IVSD: 1.3 CM (ref 0.6–1)
ECHO LV MASS 2D: 164.3 G (ref 88–224)
ECHO LV MASS 2D: 189.2 G (ref 88–224)
ECHO LV MASS INDEX 2D: 83.4 G/M2 (ref 49–115)
ECHO LV MASS INDEX 2D: 99.6 G/M2 (ref 49–115)
ECHO LV POSTERIOR WALL DIASTOLIC: 1 CM (ref 0.6–1)
ECHO LV POSTERIOR WALL DIASTOLIC: 1.2 CM (ref 0.6–1)
ECHO LV RELATIVE WALL THICKNESS RATIO: 0.41
ECHO LV RELATIVE WALL THICKNESS RATIO: 0.57
ECHO LVOT AREA: 2.8 CM2
ECHO LVOT AV VTI INDEX: 0.95
ECHO LVOT DIAM: 1.9 CM
ECHO LVOT MEAN GRADIENT: 4 MMHG
ECHO LVOT PEAK GRADIENT: 7 MMHG
ECHO LVOT PEAK GRADIENT: 8 MMHG
ECHO LVOT PEAK VELOCITY: 1.3 M/S
ECHO LVOT PEAK VELOCITY: 1.4 M/S
ECHO LVOT STROKE VOLUME INDEX: 37.4 ML/M2
ECHO LVOT SV: 73.7 ML
ECHO LVOT VTI: 26 CM
ECHO MV A VELOCITY: 0.34 M/S
ECHO MV A VELOCITY: 0.47 M/S
ECHO MV E DECELERATION TIME (DT): 305 MS
ECHO MV E VELOCITY: 0.51 M/S
ECHO MV E VELOCITY: 1.03 M/S
ECHO MV E/A RATIO: 1.5
ECHO MV E/A RATIO: 2.19
ECHO MV E/E' LATERAL: 10.3
ECHO MV E/E' LATERAL: 5.1
ECHO MV E/E' RATIO (AVERAGED): 10.3
ECHO MV E/E' RATIO (AVERAGED): 5.74
ECHO MV E/E' SEPTAL: 10.3
ECHO MV E/E' SEPTAL: 6.38
ECHO PULMONARY ARTERY END DIASTOLIC PRESSURE: 3 MMHG
ECHO PV MAX VELOCITY: 0.9 M/S
ECHO PV MAX VELOCITY: 1 M/S
ECHO PV PEAK GRADIENT: 3 MMHG
ECHO PV PEAK GRADIENT: 4 MMHG
ECHO PV REGURGITANT MAX VELOCITY: 0.9 M/S
ECHO RA AREA 4C: 12.5 CM2
ECHO RA AREA 4C: 12.9 CM2
ECHO RA END SYSTOLIC VOLUME APICAL 4 CHAMBER INDEX BSA: 13 ML/M2
ECHO RA END SYSTOLIC VOLUME APICAL 4 CHAMBER INDEX BSA: 15 ML/M2
ECHO RA VOLUME: 26 ML
ECHO RA VOLUME: 28 ML
ECHO RIGHT VENTRICULAR SYSTOLIC PRESSURE (RVSP): 30 MMHG
ECHO RV BASAL DIMENSION: 3 CM
ECHO RV FREE WALL PEAK S': 10 CM/S
ECHO RV FREE WALL PEAK S': 20 CM/S
ECHO RV INTERNAL DIMENSION: 2.6 CM
ECHO RV LONGITUDINAL DIMENSION: 6.7 CM
ECHO RV MID DIMENSION: 2.7 CM
ECHO RV TAPSE: 1.8 CM (ref 1.7–?)
ECHO RV TAPSE: 2.5 CM (ref 1.7–?)
ECHO TV REGURGITANT MAX VELOCITY: 2.15 M/S
ECHO TV REGURGITANT MAX VELOCITY: 2.62 M/S
ECHO TV REGURGITANT PEAK GRADIENT: 18 MMHG
ECHO TV REGURGITANT PEAK GRADIENT: 27 MMHG
EKG ATRIAL RATE: 57 BPM
EKG ATRIAL RATE: 58 BPM
EKG ATRIAL RATE: 70 BPM
EKG DIAGNOSIS: NORMAL
EKG P AXIS: 18 DEGREES
EKG P AXIS: 31 DEGREES
EKG P AXIS: 46 DEGREES
EKG P-R INTERVAL: 126 MS
EKG P-R INTERVAL: 136 MS
EKG P-R INTERVAL: 140 MS
EKG Q-T INTERVAL: 418 MS
EKG Q-T INTERVAL: 444 MS
EKG Q-T INTERVAL: 452 MS
EKG QRS DURATION: 86 MS
EKG QRS DURATION: 86 MS
EKG QRS DURATION: 88 MS
EKG QTC CALCULATION (BAZETT): 435 MS
EKG QTC CALCULATION (BAZETT): 439 MS
EKG QTC CALCULATION (BAZETT): 451 MS
EKG R AXIS: -12 DEGREES
EKG R AXIS: -14 DEGREES
EKG R AXIS: -54 DEGREES
EKG T AXIS: 23 DEGREES
EKG T AXIS: 28 DEGREES
EKG T AXIS: 37 DEGREES
EKG VENTRICULAR RATE: 57 BPM
EKG VENTRICULAR RATE: 58 BPM
EKG VENTRICULAR RATE: 70 BPM
EST. AVERAGE GLUCOSE BLD GHB EST-MCNC: 234.6 MG/DL
GFR SERPLBLD CREATININE-BSD FMLA CKD-EPI: >90 ML/MIN/{1.73_M2}
GLUCOSE BLD-MCNC: 241 MG/DL (ref 70–99)
GLUCOSE BLD-MCNC: 273 MG/DL (ref 70–99)
GLUCOSE BLD-MCNC: 303 MG/DL (ref 70–99)
GLUCOSE BLD-MCNC: 344 MG/DL (ref 70–99)
GLUCOSE SERPL-MCNC: 281 MG/DL (ref 70–99)
GLUCOSE UR STRIP.AUTO-MCNC: >=1000 MG/DL
HBA1C MFR BLD: 9.8 %
HCO3 BLDA-SCNC: 24.9 MMOL/L (ref 21–29)
HCT VFR BLD AUTO: 43.9 % (ref 40.5–52.5)
HCT VFR BLD AUTO: 46 % (ref 40.5–52.5)
HDLC SERPL-MCNC: 49 MG/DL (ref 40–60)
HGB BLD-MCNC: 15 G/DL (ref 13.5–17.5)
HGB BLD-MCNC: 15.4 G/DL (ref 13.5–17.5)
HGB BLDA-MCNC: 16.1 G/DL (ref 13.5–17.5)
HGB UR QL STRIP.AUTO: NEGATIVE
KETONES UR STRIP.AUTO-MCNC: 15 MG/DL
LDLC SERPL CALC-MCNC: ABNORMAL MG/DL
LDLC SERPL-MCNC: 224 MG/DL
LEUKOCYTE ESTERASE UR QL STRIP.AUTO: NEGATIVE
MCH RBC QN AUTO: 28.8 PG (ref 26–34)
MCH RBC QN AUTO: 29.6 PG (ref 26–34)
MCHC RBC AUTO-ENTMCNC: 33.5 G/DL (ref 31–36)
MCHC RBC AUTO-ENTMCNC: 34.2 G/DL (ref 31–36)
MCV RBC AUTO: 86.1 FL (ref 80–100)
MCV RBC AUTO: 86.5 FL (ref 80–100)
METHGB MFR BLDA: 0.4 %
NITRITE UR QL STRIP.AUTO: NEGATIVE
O2 THERAPY: NORMAL
PCO2 BLDA: 37.2 MMHG (ref 35–45)
PERFORMED ON: ABNORMAL
PH BLDA: 7.43 [PH] (ref 7.35–7.45)
PH UR STRIP.AUTO: 5.5 [PH] (ref 5–8)
PLATELET # BLD AUTO: 219 K/UL (ref 135–450)
PLATELET # BLD AUTO: 240 K/UL (ref 135–450)
PMV BLD AUTO: 9.5 FL (ref 5–10.5)
PMV BLD AUTO: 9.9 FL (ref 5–10.5)
PO2 BLDA: 80.5 MMHG (ref 75–108)
POC ACT LR: 150 SEC
POTASSIUM SERPL-SCNC: 4.4 MMOL/L (ref 3.5–5.1)
PROT UR STRIP.AUTO-MCNC: NEGATIVE MG/DL
RBC # BLD AUTO: 5.08 M/UL (ref 4.2–5.9)
RBC # BLD AUTO: 5.34 M/UL (ref 4.2–5.9)
SAO2 % BLDA: 97 %
SODIUM SERPL-SCNC: 137 MMOL/L (ref 136–145)
SP GR UR STRIP.AUTO: >=1.03 (ref 1–1.03)
TRIGL SERPL-MCNC: 306 MG/DL (ref 0–150)
TROPONIN, HIGH SENSITIVITY: 311 NG/L (ref 0–22)
TROPONIN, HIGH SENSITIVITY: 9410 NG/L (ref 0–22)
UA COMPLETE W REFLEX CULTURE PNL UR: ABNORMAL
UA DIPSTICK W REFLEX MICRO PNL UR: ABNORMAL
URN SPEC COLLECT METH UR: ABNORMAL
UROBILINOGEN UR STRIP-ACNC: 1 E.U./DL
VAS LEFT ARM BP: 126 MMHG
VAS LEFT CCA DIST EDV: 22.5 CM/S
VAS LEFT CCA DIST PSV: 86.6 CM/S
VAS LEFT CCA MID EDV: 14.7 CM/S
VAS LEFT CCA MID PSV: 88.4 CM/S
VAS LEFT CCA PROX EDV: 28 CM/S
VAS LEFT CCA PROX PSV: 111 CM/S
VAS LEFT ECA EDV: 14.7 CM/S
VAS LEFT ECA PSV: 159 CM/S
VAS LEFT GSV AT KNEE DIAM: 2.2 MM
VAS LEFT GSV BK DIST DIAM: 2.4 MM
VAS LEFT GSV BK MID DIAM: 2.2 MM
VAS LEFT GSV BK PROX DIAM: 2.1 MM
VAS LEFT GSV JUNC DIAM: 6.3 MM
VAS LEFT GSV THIGH DIST DIAM: 2 MM
VAS LEFT GSV THIGH MID DIAM: 3.8 MM
VAS LEFT GSV THIGH PROX DIAM: 3.5 MM
VAS LEFT ICA DIST EDV: 26.3 CM/S
VAS LEFT ICA DIST PSV: 80.7 CM/S
VAS LEFT ICA MID EDV: 26 CM/S
VAS LEFT ICA MID PSV: 73.7 CM/S
VAS LEFT ICA PROX EDV: 30.3 CM/S
VAS LEFT ICA PROX PSV: 86.6 CM/S
VAS LEFT ICA/CCA PSV: 1
VAS LEFT SUBCLAVIAN PROX PSV: 151 CM/S
VAS LEFT VERTEBRAL EDV: 23 CM/S
VAS LEFT VERTEBRAL PSV: 60.4 CM/S
VAS RIGHT ARM BP: 122 MMHG
VAS RIGHT CCA DIST EDV: 24.9 CM/S
VAS RIGHT CCA DIST PSV: 93.2 CM/S
VAS RIGHT CCA MID EDV: 19.3 CM/S
VAS RIGHT CCA MID PSV: 82 CM/S
VAS RIGHT CCA PROX EDV: 19.3 CM/S
VAS RIGHT CCA PROX PSV: 111 CM/S
VAS RIGHT ECA PSV: 93.2 CM/S
VAS RIGHT GSV AT KNEE DIAM: 2.3 MM
VAS RIGHT GSV BK DIST DIAM: 2.6 MM
VAS RIGHT GSV BK MID DIAM: 2 MM
VAS RIGHT GSV BK PROX DIAM: 2.3 MM
VAS RIGHT GSV JUNC DIAM: 5.1 MM
VAS RIGHT GSV THIGH DIST DIAM: 2.7 MM
VAS RIGHT GSV THIGH MID DIAM: 3.7 MM
VAS RIGHT GSV THIGH PROX DIAM: 4.2 MM
VAS RIGHT ICA DIST EDV: 45.4 CM/S
VAS RIGHT ICA DIST PSV: 106 CM/S
VAS RIGHT ICA MID EDV: 36.7 CM/S
VAS RIGHT ICA MID PSV: 92.6 CM/S
VAS RIGHT ICA PROX EDV: 32.3 CM/S
VAS RIGHT ICA PROX PSV: 85.7 CM/S
VAS RIGHT ICA/CCA PSV: 0.99
VAS RIGHT SUBCLAVIAN PROX PSV: 124 CM/S
VAS RIGHT VERTEBRAL EDV: 16.8 CM/S
VAS RIGHT VERTEBRAL PSV: 56.5 CM/S
VLDLC SERPL CALC-MCNC: ABNORMAL MG/DL
WBC # BLD AUTO: 11.2 K/UL (ref 4–11)
WBC # BLD AUTO: 11.7 K/UL (ref 4–11)

## 2024-07-15 PROCEDURE — 6370000000 HC RX 637 (ALT 250 FOR IP): Performed by: STUDENT IN AN ORGANIZED HEALTH CARE EDUCATION/TRAINING PROGRAM

## 2024-07-15 PROCEDURE — 99152 MOD SED SAME PHYS/QHP 5/>YRS: CPT | Performed by: INTERNAL MEDICINE

## 2024-07-15 PROCEDURE — 2500000003 HC RX 250 WO HCPCS: Performed by: INTERNAL MEDICINE

## 2024-07-15 PROCEDURE — 83036 HEMOGLOBIN GLYCOSYLATED A1C: CPT

## 2024-07-15 PROCEDURE — 2709999900 HC NON-CHARGEABLE SUPPLY: Performed by: INTERNAL MEDICINE

## 2024-07-15 PROCEDURE — 94760 N-INVAS EAR/PLS OXIMETRY 1: CPT

## 2024-07-15 PROCEDURE — 36415 COLL VENOUS BLD VENIPUNCTURE: CPT

## 2024-07-15 PROCEDURE — 93880 EXTRACRANIAL BILAT STUDY: CPT

## 2024-07-15 PROCEDURE — 2580000003 HC RX 258: Performed by: INTERNAL MEDICINE

## 2024-07-15 PROCEDURE — 93971 EXTREMITY STUDY: CPT | Performed by: INTERNAL MEDICINE

## 2024-07-15 PROCEDURE — 93005 ELECTROCARDIOGRAM TRACING: CPT | Performed by: INTERNAL MEDICINE

## 2024-07-15 PROCEDURE — C1725 CATH, TRANSLUMIN NON-LASER: HCPCS | Performed by: INTERNAL MEDICINE

## 2024-07-15 PROCEDURE — 85520 HEPARIN ASSAY: CPT

## 2024-07-15 PROCEDURE — 93970 EXTREMITY STUDY: CPT

## 2024-07-15 PROCEDURE — 85027 COMPLETE CBC AUTOMATED: CPT

## 2024-07-15 PROCEDURE — 93306 TTE W/DOPPLER COMPLETE: CPT

## 2024-07-15 PROCEDURE — 80061 LIPID PANEL: CPT

## 2024-07-15 PROCEDURE — 36600 WITHDRAWAL OF ARTERIAL BLOOD: CPT

## 2024-07-15 PROCEDURE — 6360000002 HC RX W HCPCS: Performed by: INTERNAL MEDICINE

## 2024-07-15 PROCEDURE — C1769 GUIDE WIRE: HCPCS | Performed by: INTERNAL MEDICINE

## 2024-07-15 PROCEDURE — 6370000000 HC RX 637 (ALT 250 FOR IP): Performed by: INTERNAL MEDICINE

## 2024-07-15 PROCEDURE — C1894 INTRO/SHEATH, NON-LASER: HCPCS | Performed by: INTERNAL MEDICINE

## 2024-07-15 PROCEDURE — 82803 BLOOD GASES ANY COMBINATION: CPT

## 2024-07-15 PROCEDURE — 92920 PRQ TRLUML C ANGIOP 1ART&/BR: CPT | Performed by: INTERNAL MEDICINE

## 2024-07-15 PROCEDURE — 93306 TTE W/DOPPLER COMPLETE: CPT | Performed by: INTERNAL MEDICINE

## 2024-07-15 PROCEDURE — 93010 ELECTROCARDIOGRAM REPORT: CPT | Performed by: INTERNAL MEDICINE

## 2024-07-15 PROCEDURE — 6360000004 HC RX CONTRAST MEDICATION: Performed by: INTERNAL MEDICINE

## 2024-07-15 PROCEDURE — 85347 COAGULATION TIME ACTIVATED: CPT

## 2024-07-15 PROCEDURE — 1200000000 HC SEMI PRIVATE

## 2024-07-15 PROCEDURE — 71250 CT THORAX DX C-: CPT

## 2024-07-15 PROCEDURE — 6360000002 HC RX W HCPCS: Performed by: STUDENT IN AN ORGANIZED HEALTH CARE EDUCATION/TRAINING PROGRAM

## 2024-07-15 PROCEDURE — C1887 CATHETER, GUIDING: HCPCS | Performed by: INTERNAL MEDICINE

## 2024-07-15 PROCEDURE — B2111ZZ FLUOROSCOPY OF MULTIPLE CORONARY ARTERIES USING LOW OSMOLAR CONTRAST: ICD-10-PCS | Performed by: STUDENT IN AN ORGANIZED HEALTH CARE EDUCATION/TRAINING PROGRAM

## 2024-07-15 PROCEDURE — 4A023N7 MEASUREMENT OF CARDIAC SAMPLING AND PRESSURE, LEFT HEART, PERCUTANEOUS APPROACH: ICD-10-PCS | Performed by: STUDENT IN AN ORGANIZED HEALTH CARE EDUCATION/TRAINING PROGRAM

## 2024-07-15 PROCEDURE — 81003 URINALYSIS AUTO W/O SCOPE: CPT

## 2024-07-15 PROCEDURE — 93880 EXTRACRANIAL BILAT STUDY: CPT | Performed by: INTERNAL MEDICINE

## 2024-07-15 PROCEDURE — 80048 BASIC METABOLIC PNL TOTAL CA: CPT

## 2024-07-15 PROCEDURE — 93458 L HRT ARTERY/VENTRICLE ANGIO: CPT | Performed by: INTERNAL MEDICINE

## 2024-07-15 PROCEDURE — 84484 ASSAY OF TROPONIN QUANT: CPT

## 2024-07-15 PROCEDURE — 99153 MOD SED SAME PHYS/QHP EA: CPT | Performed by: INTERNAL MEDICINE

## 2024-07-15 PROCEDURE — 94010 BREATHING CAPACITY TEST: CPT

## 2024-07-15 PROCEDURE — 85730 THROMBOPLASTIN TIME PARTIAL: CPT

## 2024-07-15 PROCEDURE — 99223 1ST HOSP IP/OBS HIGH 75: CPT | Performed by: INTERNAL MEDICINE

## 2024-07-15 RX ORDER — ACETAMINOPHEN 650 MG/1
650 SUPPOSITORY RECTAL EVERY 6 HOURS PRN
Status: DISCONTINUED | OUTPATIENT
Start: 2024-07-15 | End: 2024-07-17

## 2024-07-15 RX ORDER — HEPARIN SODIUM 10000 [USP'U]/100ML
5-30 INJECTION, SOLUTION INTRAVENOUS CONTINUOUS
Status: DISCONTINUED | OUTPATIENT
Start: 2024-07-15 | End: 2024-07-17

## 2024-07-15 RX ORDER — SODIUM CHLORIDE 0.9 % (FLUSH) 0.9 %
5-40 SYRINGE (ML) INJECTION PRN
Status: DISCONTINUED | OUTPATIENT
Start: 2024-07-15 | End: 2024-07-15 | Stop reason: HOSPADM

## 2024-07-15 RX ORDER — SODIUM CHLORIDE 9 MG/ML
INJECTION, SOLUTION INTRAVENOUS CONTINUOUS PRN
Status: COMPLETED | OUTPATIENT
Start: 2024-07-15 | End: 2024-07-15

## 2024-07-15 RX ORDER — NITROGLYCERIN 0.4 MG/1
TABLET SUBLINGUAL PRN
Status: DISCONTINUED | OUTPATIENT
Start: 2024-07-15 | End: 2024-07-15 | Stop reason: HOSPADM

## 2024-07-15 RX ORDER — GLUCAGON 1 MG/ML
1 KIT INJECTION PRN
Status: DISCONTINUED | OUTPATIENT
Start: 2024-07-15 | End: 2024-07-17

## 2024-07-15 RX ORDER — ATORVASTATIN CALCIUM 80 MG/1
80 TABLET, FILM COATED ORAL NIGHTLY
Status: DISCONTINUED | OUTPATIENT
Start: 2024-07-15 | End: 2024-07-17

## 2024-07-15 RX ORDER — HEPARIN SODIUM 1000 [USP'U]/ML
4000 INJECTION, SOLUTION INTRAVENOUS; SUBCUTANEOUS ONCE
Status: DISCONTINUED | OUTPATIENT
Start: 2024-07-15 | End: 2024-07-15 | Stop reason: ALTCHOICE

## 2024-07-15 RX ORDER — INSULIN LISPRO 100 [IU]/ML
0-4 INJECTION, SOLUTION INTRAVENOUS; SUBCUTANEOUS NIGHTLY
Status: DISCONTINUED | OUTPATIENT
Start: 2024-07-15 | End: 2024-07-16

## 2024-07-15 RX ORDER — EPTIFIBATIDE 2 MG/ML
INJECTION, SOLUTION INTRAVENOUS PRN
Status: DISCONTINUED | OUTPATIENT
Start: 2024-07-15 | End: 2024-07-15 | Stop reason: HOSPADM

## 2024-07-15 RX ORDER — EPTIFIBATIDE 0.75 MG/ML
2 INJECTION, SOLUTION INTRAVENOUS CONTINUOUS
Status: DISCONTINUED | OUTPATIENT
Start: 2024-07-15 | End: 2024-07-15 | Stop reason: HOSPADM

## 2024-07-15 RX ORDER — HEPARIN SODIUM 1000 [USP'U]/ML
INJECTION, SOLUTION INTRAVENOUS; SUBCUTANEOUS PRN
Status: DISCONTINUED | OUTPATIENT
Start: 2024-07-15 | End: 2024-07-15 | Stop reason: HOSPADM

## 2024-07-15 RX ORDER — ACETAMINOPHEN 325 MG/1
650 TABLET ORAL EVERY 6 HOURS PRN
Status: DISCONTINUED | OUTPATIENT
Start: 2024-07-15 | End: 2024-07-17

## 2024-07-15 RX ORDER — INSULIN LISPRO 100 [IU]/ML
4 INJECTION, SOLUTION INTRAVENOUS; SUBCUTANEOUS
Status: DISCONTINUED | OUTPATIENT
Start: 2024-07-15 | End: 2024-07-16

## 2024-07-15 RX ORDER — EPTIFIBATIDE 0.75 MG/ML
INJECTION, SOLUTION INTRAVENOUS CONTINUOUS PRN
Status: COMPLETED | OUTPATIENT
Start: 2024-07-15 | End: 2024-07-15

## 2024-07-15 RX ORDER — ONDANSETRON 2 MG/ML
4 INJECTION INTRAMUSCULAR; INTRAVENOUS EVERY 6 HOURS PRN
Status: DISCONTINUED | OUTPATIENT
Start: 2024-07-15 | End: 2024-07-15 | Stop reason: HOSPADM

## 2024-07-15 RX ORDER — MIDAZOLAM HYDROCHLORIDE 1 MG/ML
INJECTION INTRAMUSCULAR; INTRAVENOUS PRN
Status: DISCONTINUED | OUTPATIENT
Start: 2024-07-15 | End: 2024-07-15 | Stop reason: HOSPADM

## 2024-07-15 RX ORDER — DEXTROSE MONOHYDRATE 100 MG/ML
INJECTION, SOLUTION INTRAVENOUS CONTINUOUS PRN
Status: DISCONTINUED | OUTPATIENT
Start: 2024-07-15 | End: 2024-07-17

## 2024-07-15 RX ORDER — ONDANSETRON 4 MG/1
4 TABLET, ORALLY DISINTEGRATING ORAL EVERY 8 HOURS PRN
Status: DISCONTINUED | OUTPATIENT
Start: 2024-07-15 | End: 2024-07-17

## 2024-07-15 RX ORDER — HEPARIN SODIUM 1000 [USP'U]/ML
2000 INJECTION, SOLUTION INTRAVENOUS; SUBCUTANEOUS PRN
Status: DISCONTINUED | OUTPATIENT
Start: 2024-07-15 | End: 2024-07-17

## 2024-07-15 RX ORDER — ONDANSETRON 2 MG/ML
4 INJECTION INTRAMUSCULAR; INTRAVENOUS EVERY 6 HOURS PRN
Status: DISCONTINUED | OUTPATIENT
Start: 2024-07-15 | End: 2024-07-17

## 2024-07-15 RX ORDER — SODIUM CHLORIDE 9 MG/ML
INJECTION, SOLUTION INTRAVENOUS CONTINUOUS
Status: DISCONTINUED | OUTPATIENT
Start: 2024-07-15 | End: 2024-07-15 | Stop reason: HOSPADM

## 2024-07-15 RX ORDER — SODIUM CHLORIDE 9 MG/ML
INJECTION, SOLUTION INTRAVENOUS PRN
Status: DISCONTINUED | OUTPATIENT
Start: 2024-07-15 | End: 2024-07-17

## 2024-07-15 RX ORDER — POTASSIUM CHLORIDE 29.8 MG/ML
20 INJECTION INTRAVENOUS PRN
Status: DISCONTINUED | OUTPATIENT
Start: 2024-07-15 | End: 2024-07-17

## 2024-07-15 RX ORDER — SODIUM CHLORIDE 0.9 % (FLUSH) 0.9 %
5-40 SYRINGE (ML) INJECTION EVERY 12 HOURS SCHEDULED
Status: DISCONTINUED | OUTPATIENT
Start: 2024-07-15 | End: 2024-07-15 | Stop reason: HOSPADM

## 2024-07-15 RX ORDER — POLYETHYLENE GLYCOL 3350 17 G/17G
17 POWDER, FOR SOLUTION ORAL DAILY PRN
Status: DISCONTINUED | OUTPATIENT
Start: 2024-07-15 | End: 2024-07-17

## 2024-07-15 RX ORDER — HEPARIN SODIUM 1000 [USP'U]/ML
2000 INJECTION, SOLUTION INTRAVENOUS; SUBCUTANEOUS ONCE
Status: COMPLETED | OUTPATIENT
Start: 2024-07-15 | End: 2024-07-15

## 2024-07-15 RX ORDER — EPTIFIBATIDE 0.75 MG/ML
2 INJECTION, SOLUTION INTRAVENOUS CONTINUOUS
Status: DISCONTINUED | OUTPATIENT
Start: 2024-07-15 | End: 2024-07-17

## 2024-07-15 RX ORDER — FENTANYL CITRATE 50 UG/ML
INJECTION, SOLUTION INTRAMUSCULAR; INTRAVENOUS PRN
Status: DISCONTINUED | OUTPATIENT
Start: 2024-07-15 | End: 2024-07-15 | Stop reason: HOSPADM

## 2024-07-15 RX ORDER — POTASSIUM CHLORIDE 7.45 MG/ML
10 INJECTION INTRAVENOUS PRN
Status: DISCONTINUED | OUTPATIENT
Start: 2024-07-15 | End: 2024-07-17

## 2024-07-15 RX ORDER — HEPARIN SODIUM 1000 [USP'U]/ML
4000 INJECTION, SOLUTION INTRAVENOUS; SUBCUTANEOUS PRN
Status: DISCONTINUED | OUTPATIENT
Start: 2024-07-15 | End: 2024-07-17

## 2024-07-15 RX ORDER — MAGNESIUM SULFATE IN WATER 40 MG/ML
2000 INJECTION, SOLUTION INTRAVENOUS PRN
Status: DISCONTINUED | OUTPATIENT
Start: 2024-07-15 | End: 2024-07-17

## 2024-07-15 RX ORDER — ENOXAPARIN SODIUM 100 MG/ML
40 INJECTION SUBCUTANEOUS NIGHTLY
Status: DISCONTINUED | OUTPATIENT
Start: 2024-07-15 | End: 2024-07-15

## 2024-07-15 RX ORDER — SODIUM CHLORIDE 0.9 % (FLUSH) 0.9 %
5-40 SYRINGE (ML) INJECTION EVERY 12 HOURS SCHEDULED
Status: DISCONTINUED | OUTPATIENT
Start: 2024-07-15 | End: 2024-07-17

## 2024-07-15 RX ORDER — ACETAMINOPHEN 325 MG/1
650 TABLET ORAL EVERY 4 HOURS PRN
Status: DISCONTINUED | OUTPATIENT
Start: 2024-07-15 | End: 2024-07-15 | Stop reason: HOSPADM

## 2024-07-15 RX ORDER — METFORMIN HYDROCHLORIDE 500 MG/1
2000 TABLET, EXTENDED RELEASE ORAL
Status: ON HOLD | COMMUNITY
End: 2024-07-23 | Stop reason: HOSPADM

## 2024-07-15 RX ORDER — INSULIN GLARGINE 100 [IU]/ML
15 INJECTION, SOLUTION SUBCUTANEOUS NIGHTLY
Status: DISCONTINUED | OUTPATIENT
Start: 2024-07-15 | End: 2024-07-16

## 2024-07-15 RX ORDER — SODIUM CHLORIDE 0.9 % (FLUSH) 0.9 %
5-40 SYRINGE (ML) INJECTION PRN
Status: DISCONTINUED | OUTPATIENT
Start: 2024-07-15 | End: 2024-07-17

## 2024-07-15 RX ORDER — SODIUM CHLORIDE 9 MG/ML
INJECTION, SOLUTION INTRAVENOUS PRN
Status: DISCONTINUED | OUTPATIENT
Start: 2024-07-15 | End: 2024-07-15 | Stop reason: HOSPADM

## 2024-07-15 RX ORDER — INSULIN LISPRO 100 [IU]/ML
0-8 INJECTION, SOLUTION INTRAVENOUS; SUBCUTANEOUS
Status: DISCONTINUED | OUTPATIENT
Start: 2024-07-15 | End: 2024-07-16

## 2024-07-15 RX ADMIN — EPTIFIBATIDE 2 MCG/KG/MIN: 0.75 INJECTION INTRAVENOUS at 17:07

## 2024-07-15 RX ADMIN — ATORVASTATIN CALCIUM 80 MG: 80 TABLET, FILM COATED ORAL at 08:31

## 2024-07-15 RX ADMIN — SODIUM CHLORIDE: 9 INJECTION, SOLUTION INTRAVENOUS at 11:44

## 2024-07-15 RX ADMIN — INSULIN LISPRO 2 UNITS: 100 INJECTION, SOLUTION INTRAVENOUS; SUBCUTANEOUS at 08:30

## 2024-07-15 RX ADMIN — HEPARIN SODIUM 1140 UNITS/HR: 10000 INJECTION, SOLUTION INTRAVENOUS at 11:15

## 2024-07-15 RX ADMIN — HEPARIN SODIUM 12 UNITS/KG/HR: 10000 INJECTION, SOLUTION INTRAVENOUS at 16:26

## 2024-07-15 RX ADMIN — INSULIN LISPRO 2 UNITS: 100 INJECTION, SOLUTION INTRAVENOUS; SUBCUTANEOUS at 19:07

## 2024-07-15 RX ADMIN — ONDANSETRON 4 MG: 2 INJECTION INTRAMUSCULAR; INTRAVENOUS at 11:04

## 2024-07-15 RX ADMIN — EPTIFIBATIDE 2 MCG/KG/MIN: 0.75 INJECTION INTRAVENOUS at 17:58

## 2024-07-15 RX ADMIN — INSULIN GLARGINE 15 UNITS: 100 INJECTION, SOLUTION SUBCUTANEOUS at 20:13

## 2024-07-15 RX ADMIN — INSULIN LISPRO 4 UNITS: 100 INJECTION, SOLUTION INTRAVENOUS; SUBCUTANEOUS at 20:13

## 2024-07-15 RX ADMIN — INSULIN LISPRO 3 UNITS: 100 INJECTION, SOLUTION INTRAVENOUS; SUBCUTANEOUS at 12:05

## 2024-07-15 RX ADMIN — ATORVASTATIN CALCIUM 80 MG: 80 TABLET, FILM COATED ORAL at 20:12

## 2024-07-15 RX ADMIN — HEPARIN SODIUM 2000 UNITS: 1000 INJECTION INTRAVENOUS; SUBCUTANEOUS at 04:00

## 2024-07-15 RX ADMIN — ASPIRIN 81 MG: 81 TABLET, CHEWABLE ORAL at 08:31

## 2024-07-15 RX ADMIN — EPTIFIBATIDE 2 MCG/KG/MIN: 0.75 INJECTION INTRAVENOUS at 11:44

## 2024-07-15 RX ADMIN — SODIUM CHLORIDE, PRESERVATIVE FREE 10 ML: 5 INJECTION INTRAVENOUS at 20:13

## 2024-07-15 ASSESSMENT — PAIN DESCRIPTION - ORIENTATION
ORIENTATION: MID
ORIENTATION: RIGHT;LEFT

## 2024-07-15 ASSESSMENT — PAIN DESCRIPTION - LOCATION
LOCATION: CHEST
LOCATION: CHEST

## 2024-07-15 ASSESSMENT — PAIN DESCRIPTION - DESCRIPTORS: DESCRIPTORS: BURNING;HEAVINESS;PRESSURE

## 2024-07-15 ASSESSMENT — PAIN SCALES - GENERAL
PAINLEVEL_OUTOF10: 7
PAINLEVEL_OUTOF10: 5

## 2024-07-15 ASSESSMENT — PAIN DESCRIPTION - PAIN TYPE: TYPE: ACUTE PAIN

## 2024-07-15 ASSESSMENT — PAIN - FUNCTIONAL ASSESSMENT: PAIN_FUNCTIONAL_ASSESSMENT: PREVENTS OR INTERFERES SOME ACTIVE ACTIVITIES AND ADLS

## 2024-07-15 NOTE — PROGRESS NOTES
Received as inpatient as NSTEMI, complains of level 5 \"lung pain\" non radiating, denies SOB.  Informed consent obtained, no family present.

## 2024-07-15 NOTE — CARE COORDINATION
07/15/24 1626   Readmission Assessment   Number of Days since last admission? 1-7 days   Previous Disposition Other (comment)  (transferred from [place] for CVTS consult)   Who is being Interviewed Patient  (chart reviewed transferred from [place] for CVTS consult)   What was the patient's/caregiver's perception as to why they think they needed to return back to the hospital? Other (Comment)  (transferred from [place] for CVTS consult)   Did you visit your Primary Care Physician after you left the hospital, before you returned this time? No   Why weren't you able to visit your PCP? Other (Comment)  (transferred from [place] for CVTS consult)   Did you see a specialist, such as Cardiac, Pulmonary, Orthopedic Physician, etc. after you left the hospital? Yes  (transferred from [place] for CVTS consult)   Who advised the patient to return to the hospital? Physician;Other (Comment)  (transferred from [place] for CVTS consult)   Does the patient report anything that got in the way of taking their medications? No  (transferred from [place] for CVTS consult)   In our efforts to provide the best possible care to you and others like you, can you think of anything that we could have done to help you after you left the hospital the first time, so that you might not have needed to return so soon? Other (Comment)  (transferred from [place] for CVTS consult)

## 2024-07-15 NOTE — CARE COORDINATION
Chart reviewed for discharge planning. Pt to have CABG on 7/17/24.    CM will follow for pt progress and discharge plan.    Breana Mcgrath RN, BSN  [phone]

## 2024-07-15 NOTE — PROGRESS NOTES
Transport present and just left with patient. Called [facility] and updated Anya on any changes

## 2024-07-15 NOTE — PROGRESS NOTES
Air was removed from TR band at 1145.     1200 check- bleeding noted, air applied back into TR band. TR band with 11 mL

## 2024-07-15 NOTE — H&P
V2.0  History and Physical      Name:  Landon Ordoñez /Age/Sex: 1975  (49 y.o. male)   MRN & CSN:  [id number] & [id number] Encounter Date/Time: 7/15/2024 4:29 PM EDT   Location:  [address]- PCP: No primary care provider on file.       Hospital Day: 1    Assessment and Plan:   Landon Ordoñez is a 49 y.o. male with a pmh of T2DM, HLD transferred from [facility] for repeat evaluation for CABG. He had presented care on 2024 with complaints of burning chest pain radiating to both arms. LHC done on 7/15/2024 revealed CAD, multiple vessel.    Hospital Problems             Last Modified POA    * (Principal) CAD, multiple vessel 7/15/2024 Yes       Plan:    Multivessel CAD, NSTEMI:  CT surgery consult appreciated.  Follow-up further testing and recommendations.  Continue heparin drip and eptifibatide as recommended by cardiologist.    T2DM with hyperglycemia:  Reports noncompliance with medications  Follow-up A1c.  Started on basal/prandial insulin with SSI.    Hyperlipidemia: Follow-up lipid panel.   Continue statin.    Disposition:   Current Living situation: Home  Expected Disposition: Home  Estimated D/C: Unclear at this time    Diet ADULT DIET; Regular; 4 carb choices (60 gm/meal); Low Fat/Low Chol/High Fiber/2 gm Na   DVT Prophylaxis [] Lovenox, [x]  Heparin, [] SCDs, [] Ambulation,  [] Eliquis, [] Xarelto, [] Coumadin   Code Status Full Code   Surrogate Decision Maker/ BATOOL FunesGlen rodrigues (Parent)  [phone]     Personally reviewed Lab Studies and Imaging     EKG interpreted personally and results showed T wave inversions in V1 and V2    Imaging that was interpreted personally includes CXR which showed no acute abnormality.     Drugs that require monitoring for toxicity include heparin and eptifibatide drips and the method of monitoring was CBC and anti-Xa        History from:     patient, electronic medical record    History of Present Illness:     Chief Complaint: Multivessel CAD    Landon

## 2024-07-15 NOTE — PROGRESS NOTES
Pt arrived to floor via stretcher from ED and ambulated to bed. Telemetry activated. Patient oriented to room and use of call light. Call light and personal items within reach. Admission and assessment initiated. POC and education initiated and reviewed with patient. Denied further needs or questions at this time.

## 2024-07-15 NOTE — CONSULTS
[facility]  Cardiology Consult Note        CC:      Chest pain/non-STEMI             HPI:   This is a 49 y.o. male with a history of diabetes who came in again for chest pressure and shortness of breath.  The patient is an insulin-dependent diabetic.  He has a family history of atherosclerotic disease.  He is a non-smoker.  He claims to be noncompliant with his medications including insulin.    The patient works as a , third shift.  He states that [place] he noticed shortness of breath and then some pressure in his chest.  He went home and went to bed but claims to have pressure in his chest which she describes as \"my lungs hurting\".  They are still hurting.  His troponins have gone up to 311.  Is on a heparin drip.  Initial EKG showed ST depressions in V1 V2.    History reviewed. No pertinent past medical history.   History reviewed. No pertinent surgical history.   Family History   Problem Relation Age of Onset    Heart Disease Mother     Diabetes Mother     Heart Disease Father     Diabetes Father       Social History     Tobacco Use    Smoking status: Never   Vaping Use    Vaping Use: Never used   Substance Use Topics    Alcohol use: Yes     Comment: occ    Drug use: Never      No Known Allergies   atorvastatin  80 mg Oral Daily    sodium chloride flush  5-40 mL IntraVENous 2 times per day    aspirin  81 mg Oral Daily    insulin lispro  0-4 Units SubCUTAneous TID WC    insulin lispro  0-4 Units SubCUTAneous Nightly       Review of Systems -   Constitutional: Negative for weight gain/loss; malaise, fever  Respiratory: Negative for Asthma;  cough and hemoptysis  Cardiovascular: Negative for palpitations,dizziness   Gastrointestinal: Negative for abd.pain; constipation/diarrhea;    Genitourinary: Negative for stones; hematuria; frequency hesitancy  Integumentt: Negative for rash or pruritis  Hematologic/lymphatic: Negative for blood dyscrasia;

## 2024-07-15 NOTE — PROGRESS NOTES
4 Eyes Skin Assessment     NAME:  Landon Ordoñez  YOB: 1975  MEDICAL RECORD NUMBER:  [id number]    The patient is being assessed for  Cath Lab Post-Op    I agree that at least one RN has performed a thorough Head to Toe Skin Assessment on the patient. ALL assessment sites listed below have been assessed.      Areas assessed by both nurses:    Head, Face, Ears, Shoulders, Back, Chest, Arms, Elbows, Hands, Sacrum. Buttock, Coccyx, Ischium, Legs. Feet and Heels, and Under Medical Devices         Does the Patient have a Wound? No noted wound(s)       Leonel Prevention initiated by RN: Yes  Wound Care Orders initiated by RN: No    Pressure Injury (Stage 3,4, Unstageable, DTI, NWPT, and Complex wounds) if present, place Wound referral order by RN under : No    New Ostomies, if present place, Ostomy referral order under : No     Nurse 1 eSignature: Electronically signed by Brianna Will RN on 7/15/24 at 12:37 PM EDT    **SHARE this note so that the co-signing nurse can place an eSignature**    Nurse 2 eSignature: {Esignature:[id number]}

## 2024-07-15 NOTE — PROGRESS NOTES
4 Eyes Skin Assessment     NAME:  Landon Ordoñez  YOB: 1975  MEDICAL RECORD NUMBER:  [id number]    The patient is being assessed for  Admission    I agree that at least one RN has performed a thorough Head to Toe Skin Assessment on the patient. ALL assessment sites listed below have been assessed.      Areas assessed by both nurses:    Head, Face, Ears, Shoulders, Back, Chest, Arms, Elbows, Hands, Sacrum. Buttock, Coccyx, Ischium, Legs. Feet and Heels, and Under Medical Devices         Does the Patient have a Wound? No noted wound(s)       Leonel Prevention initiated by RN: No  Wound Care Orders initiated by RN: No    Pressure Injury (Stage 3,4, Unstageable, DTI, NWPT, and Complex wounds) if present, place Wound referral order by RN under : No    New Ostomies, if present place, Ostomy referral order under : No     Nurse 1 eSignature: Electronically signed by Srini North RN on 7/14/24 at 10:31 PM EDT    **SHARE this note so that the co-signing nurse can place an eSignature**    Nurse 2 eSignature: Electronically signed by Janeth Perry RN on 7/14/24 at 10:32 PM EDT

## 2024-07-15 NOTE — PROGRESS NOTES
Clinical Pharmacy Note  Heparin Dosing       Lab Results   Component Value Date/Time    ANTIXAUHEP 0.19 07/15/2024 02:41 AM      Lab Results   Component Value Date/Time    HGB 15.4 07/15/2024 02:38 AM    HCT 46.0 07/15/2024 02:38 AM     07/15/2024 02:38 AM       Current Infusion Rate: 980 units/hr    Plan:  Bolus: 2000 units  Rate: increase to 1140 units/hr  Next anti-Xa level: 1000 7/15/24    Pharmacy will continue to monitor and adjust based on anti-Xa results.  Mal Toro, PharmD

## 2024-07-15 NOTE — PROGRESS NOTES
NIKUNJ Francis called inquiring about updates. Not listed in pt's contact list. Pt asked and did give approval for RN to give updates. Updates provided    arm

## 2024-07-15 NOTE — PROGRESS NOTES
Pt arrived to CVU 1310 around 1030. TR band in place with 9 mL of air. Site WNL. Assessment completed at 1045. Pt tired post cath but does answer questions when spoken too.

## 2024-07-16 PROBLEM — I42.9 CARDIOMYOPATHY (HCC): Status: ACTIVE | Noted: 2024-07-16

## 2024-07-16 PROBLEM — E78.5 DYSLIPIDEMIA: Status: ACTIVE | Noted: 2024-07-16

## 2024-07-16 LAB
ABO + RH BLD: NORMAL
ALBUMIN SERPL-MCNC: 4.2 G/DL (ref 3.4–5)
ALBUMIN/GLOB SERPL: 1.3 {RATIO} (ref 1.1–2.2)
ALP SERPL-CCNC: 140 U/L (ref 40–129)
ALT SERPL-CCNC: 35 U/L (ref 10–40)
ANION GAP SERPL CALCULATED.3IONS-SCNC: 12 MMOL/L (ref 3–16)
APTT BLD: 107.8 SEC (ref 22.1–36.4)
APTT BLD: 51.8 SEC (ref 22.1–36.4)
APTT BLD: 68.9 SEC (ref 22.1–36.4)
APTT BLD: 75.9 SEC (ref 22.1–36.4)
AST SERPL-CCNC: 95 U/L (ref 15–37)
BILIRUB SERPL-MCNC: 0.7 MG/DL (ref 0–1)
BLD GP AB SCN SERPL QL: NORMAL
BUN SERPL-MCNC: 13 MG/DL (ref 7–20)
CALCIUM SERPL-MCNC: 9.3 MG/DL (ref 8.3–10.6)
CHLORIDE SERPL-SCNC: 100 MMOL/L (ref 99–110)
CO2 SERPL-SCNC: 26 MMOL/L (ref 21–32)
CREAT SERPL-MCNC: 0.8 MG/DL (ref 0.9–1.3)
FIBRINOGEN PPP-MCNC: 469 MG/DL (ref 227–534)
GFR SERPLBLD CREATININE-BSD FMLA CKD-EPI: >90 ML/MIN/{1.73_M2}
GLUCOSE BLD-MCNC: 127 MG/DL (ref 70–99)
GLUCOSE BLD-MCNC: 182 MG/DL (ref 70–99)
GLUCOSE BLD-MCNC: 221 MG/DL (ref 70–99)
GLUCOSE BLD-MCNC: 237 MG/DL (ref 70–99)
GLUCOSE SERPL-MCNC: 239 MG/DL (ref 70–99)
INR PPP: 0.96 (ref 0.85–1.15)
PERFORMED ON: ABNORMAL
POC ACT LR: >400 SEC
POTASSIUM SERPL-SCNC: 3.5 MMOL/L (ref 3.5–5.1)
PROT SERPL-MCNC: 7.4 G/DL (ref 6.4–8.2)
PROTHROMBIN TIME: 13 SEC (ref 11.9–14.9)
SODIUM SERPL-SCNC: 138 MMOL/L (ref 136–145)
TROPONIN, HIGH SENSITIVITY: 2745 NG/L (ref 0–22)

## 2024-07-16 PROCEDURE — 6370000000 HC RX 637 (ALT 250 FOR IP): Performed by: STUDENT IN AN ORGANIZED HEALTH CARE EDUCATION/TRAINING PROGRAM

## 2024-07-16 PROCEDURE — 1200000000 HC SEMI PRIVATE

## 2024-07-16 PROCEDURE — 36415 COLL VENOUS BLD VENIPUNCTURE: CPT

## 2024-07-16 PROCEDURE — 85610 PROTHROMBIN TIME: CPT

## 2024-07-16 PROCEDURE — 2580000003 HC RX 258: Performed by: INTERNAL MEDICINE

## 2024-07-16 PROCEDURE — 94761 N-INVAS EAR/PLS OXIMETRY MLT: CPT

## 2024-07-16 PROCEDURE — 6360000002 HC RX W HCPCS: Performed by: INTERNAL MEDICINE

## 2024-07-16 PROCEDURE — 6360000002 HC RX W HCPCS: Performed by: STUDENT IN AN ORGANIZED HEALTH CARE EDUCATION/TRAINING PROGRAM

## 2024-07-16 PROCEDURE — 86850 RBC ANTIBODY SCREEN: CPT

## 2024-07-16 PROCEDURE — 86923 COMPATIBILITY TEST ELECTRIC: CPT

## 2024-07-16 PROCEDURE — 94150 VITAL CAPACITY TEST: CPT

## 2024-07-16 PROCEDURE — 99233 SBSQ HOSP IP/OBS HIGH 50: CPT | Performed by: INTERNAL MEDICINE

## 2024-07-16 PROCEDURE — P9016 RBC LEUKOCYTES REDUCED: HCPCS

## 2024-07-16 PROCEDURE — 85730 THROMBOPLASTIN TIME PARTIAL: CPT

## 2024-07-16 PROCEDURE — 84484 ASSAY OF TROPONIN QUANT: CPT

## 2024-07-16 PROCEDURE — 85384 FIBRINOGEN ACTIVITY: CPT

## 2024-07-16 PROCEDURE — 87081 CULTURE SCREEN ONLY: CPT

## 2024-07-16 PROCEDURE — 6370000000 HC RX 637 (ALT 250 FOR IP): Performed by: INTERNAL MEDICINE

## 2024-07-16 PROCEDURE — 86900 BLOOD TYPING SEROLOGIC ABO: CPT

## 2024-07-16 PROCEDURE — 80053 COMPREHEN METABOLIC PANEL: CPT

## 2024-07-16 PROCEDURE — 86901 BLOOD TYPING SEROLOGIC RH(D): CPT

## 2024-07-16 RX ORDER — SODIUM CHLORIDE 0.9 % (FLUSH) 0.9 %
10 SYRINGE (ML) INJECTION EVERY 12 HOURS SCHEDULED
Status: DISCONTINUED | OUTPATIENT
Start: 2024-07-17 | End: 2024-07-17

## 2024-07-16 RX ORDER — NITROGLYCERIN 0.4 MG/1
0.4 TABLET SUBLINGUAL EVERY 5 MIN PRN
Status: DISCONTINUED | OUTPATIENT
Start: 2024-07-16 | End: 2024-07-17

## 2024-07-16 RX ORDER — SODIUM CHLORIDE 0.9 % (FLUSH) 0.9 %
10 SYRINGE (ML) INJECTION PRN
Status: DISCONTINUED | OUTPATIENT
Start: 2024-07-16 | End: 2024-07-17

## 2024-07-16 RX ORDER — INSULIN LISPRO 100 [IU]/ML
0.08 INJECTION, SOLUTION INTRAVENOUS; SUBCUTANEOUS
Status: DISCONTINUED | OUTPATIENT
Start: 2024-07-16 | End: 2024-07-17

## 2024-07-16 RX ORDER — CHLORHEXIDINE GLUCONATE ORAL RINSE 1.2 MG/ML
15 SOLUTION DENTAL ONCE
Status: COMPLETED | OUTPATIENT
Start: 2024-07-17 | End: 2024-07-17

## 2024-07-16 RX ORDER — INSULIN LISPRO 100 [IU]/ML
0-8 INJECTION, SOLUTION INTRAVENOUS; SUBCUTANEOUS ONCE
Status: COMPLETED | OUTPATIENT
Start: 2024-07-16 | End: 2024-07-16

## 2024-07-16 RX ORDER — CHLORHEXIDINE GLUCONATE 40 MG/ML
SOLUTION TOPICAL SEE ADMIN INSTRUCTIONS
Status: DISCONTINUED | OUTPATIENT
Start: 2024-07-16 | End: 2024-07-17 | Stop reason: HOSPADM

## 2024-07-16 RX ORDER — SODIUM CHLORIDE 9 MG/ML
INJECTION, SOLUTION INTRAVENOUS PRN
Status: DISCONTINUED | OUTPATIENT
Start: 2024-07-16 | End: 2024-07-17

## 2024-07-16 RX ORDER — BISACODYL 10 MG
10 SUPPOSITORY, RECTAL RECTAL DAILY PRN
Status: DISCONTINUED | OUTPATIENT
Start: 2024-07-16 | End: 2024-07-17

## 2024-07-16 RX ORDER — PANTOPRAZOLE SODIUM 40 MG/10ML
40 INJECTION, POWDER, LYOPHILIZED, FOR SOLUTION INTRAVENOUS
Status: COMPLETED | OUTPATIENT
Start: 2024-07-17 | End: 2024-07-17

## 2024-07-16 RX ORDER — INSULIN LISPRO 100 [IU]/ML
0-4 INJECTION, SOLUTION INTRAVENOUS; SUBCUTANEOUS NIGHTLY
Status: DISCONTINUED | OUTPATIENT
Start: 2024-07-16 | End: 2024-07-17

## 2024-07-16 RX ORDER — INSULIN LISPRO 100 [IU]/ML
0-8 INJECTION, SOLUTION INTRAVENOUS; SUBCUTANEOUS
Status: DISCONTINUED | OUTPATIENT
Start: 2024-07-16 | End: 2024-07-17

## 2024-07-16 RX ORDER — SODIUM CHLORIDE, SODIUM LACTATE, POTASSIUM CHLORIDE, CALCIUM CHLORIDE 600; 310; 30; 20 MG/100ML; MG/100ML; MG/100ML; MG/100ML
INJECTION, SOLUTION INTRAVENOUS CONTINUOUS
Status: DISCONTINUED | OUTPATIENT
Start: 2024-07-17 | End: 2024-07-17

## 2024-07-16 RX ORDER — INSULIN GLARGINE 100 [IU]/ML
10 INJECTION, SOLUTION SUBCUTANEOUS 2 TIMES DAILY
Status: DISCONTINUED | OUTPATIENT
Start: 2024-07-16 | End: 2024-07-17

## 2024-07-16 RX ORDER — INSULIN GLARGINE 100 [IU]/ML
0.25 INJECTION, SOLUTION SUBCUTANEOUS NIGHTLY
Status: DISCONTINUED | OUTPATIENT
Start: 2024-07-16 | End: 2024-07-16

## 2024-07-16 RX ORDER — INSULIN LISPRO 100 [IU]/ML
6 INJECTION, SOLUTION INTRAVENOUS; SUBCUTANEOUS ONCE
Status: COMPLETED | OUTPATIENT
Start: 2024-07-16 | End: 2024-07-16

## 2024-07-16 RX ORDER — ASPIRIN 81 MG/1
81 TABLET ORAL DAILY
Status: DISCONTINUED | OUTPATIENT
Start: 2024-07-16 | End: 2024-07-17

## 2024-07-16 RX ADMIN — POLYETHYLENE GLYCOL 3350 17 G: 17 POWDER, FOR SOLUTION ORAL at 20:59

## 2024-07-16 RX ADMIN — SODIUM CHLORIDE, PRESERVATIVE FREE 10 ML: 5 INJECTION INTRAVENOUS at 20:34

## 2024-07-16 RX ADMIN — INSULIN GLARGINE 10 UNITS: 100 INJECTION, SOLUTION SUBCUTANEOUS at 14:01

## 2024-07-16 RX ADMIN — HEPARIN SODIUM 4000 UNITS: 1000 INJECTION INTRAVENOUS; SUBCUTANEOUS at 01:25

## 2024-07-16 RX ADMIN — SODIUM CHLORIDE, PRESERVATIVE FREE 10 ML: 5 INJECTION INTRAVENOUS at 08:38

## 2024-07-16 RX ADMIN — MUPIROCIN: 20 OINTMENT TOPICAL at 20:33

## 2024-07-16 RX ADMIN — EPTIFIBATIDE 2 MCG/KG/MIN: 0.75 INJECTION INTRAVENOUS at 10:14

## 2024-07-16 RX ADMIN — INSULIN GLARGINE 10 UNITS: 100 INJECTION, SOLUTION SUBCUTANEOUS at 20:33

## 2024-07-16 RX ADMIN — INSULIN LISPRO 6 UNITS: 100 INJECTION, SOLUTION INTRAVENOUS; SUBCUTANEOUS at 09:00

## 2024-07-16 RX ADMIN — INSULIN LISPRO 6 UNITS: 100 INJECTION, SOLUTION INTRAVENOUS; SUBCUTANEOUS at 16:58

## 2024-07-16 RX ADMIN — ATORVASTATIN CALCIUM 80 MG: 80 TABLET, FILM COATED ORAL at 20:33

## 2024-07-16 RX ADMIN — INSULIN LISPRO 2 UNITS: 100 INJECTION, SOLUTION INTRAVENOUS; SUBCUTANEOUS at 09:00

## 2024-07-16 RX ADMIN — INSULIN LISPRO 6 UNITS: 100 INJECTION, SOLUTION INTRAVENOUS; SUBCUTANEOUS at 12:04

## 2024-07-16 RX ADMIN — ASPIRIN 81 MG: 81 TABLET, COATED ORAL at 11:25

## 2024-07-16 RX ADMIN — ACETAMINOPHEN 650 MG: 325 TABLET ORAL at 15:52

## 2024-07-16 RX ADMIN — HEPARIN SODIUM 20 UNITS/KG/HR: 10000 INJECTION, SOLUTION INTRAVENOUS at 07:31

## 2024-07-16 RX ADMIN — INSULIN LISPRO 2 UNITS: 100 INJECTION, SOLUTION INTRAVENOUS; SUBCUTANEOUS at 12:04

## 2024-07-16 RX ADMIN — HEPARIN SODIUM 19 UNITS/KG/HR: 10000 INJECTION, SOLUTION INTRAVENOUS at 23:51

## 2024-07-16 RX ADMIN — EPTIFIBATIDE 2 MCG/KG/MIN: 0.75 INJECTION INTRAVENOUS at 18:58

## 2024-07-16 NOTE — FLOWSHEET NOTE
07/15/24 2006   Vitals   Temp 98.6 °F (37 °C)   Temp Source Temporal   Pulse 71   Heart Rate Source Monitor   Respirations 20   /64   MAP (Calculated) 78   MAP (mmHg) 74   BP Location Left Arm   BP Method Automatic   Patient Position Semi fowlers   Cardiac Rhythm Sinus rhythm   Pain Assessment   Pain Assessment None - Denies Pain   Pain Level 0   Oxygen Therapy   SpO2 97 %   Pulse Oximetry Type Intermittent   Pulse Oximeter Device Mode Continuous   Pulse Oximeter Device Location Finger   O2 Device None (Room air)     Shift assessment complete - see complex assessment data in flowsheet. Pt alert and oriented x4. VSS. Pt POC discussed, no further questions or concerns at this time. Pt currently refusing getting up to shower at this time, pt provided with bath wipes and toothpaste/tooth brush. Bed in lowest position, call light within reach.

## 2024-07-16 NOTE — CARE COORDINATION
Case Management Assessment  Initial Evaluation    Date/Time of Evaluation: 7/16/2024 1:10 PM   Assessment Completed by: JAMIA COOMBS RN    If patient is discharged prior to next notation, then this note serves as note for discharge by case management.    Patient Name: Landon Ordoñez                   YOB: 1975  Diagnosis: CAD, multiple vessel [I25.10]                   Date / Time: 7/15/2024  1:44 PM    Patient Admission Status: Inpatient   Readmission Risk (Low < 19, Mod (19-27), High > 27): Readmission Risk Score: 9    Current PCP: Felipe Pate MD  PCP verified by CM? Yes    Chart Reviewed: Yes      History Provided by: Patient  Patient Orientation: Alert and Oriented    Patient Cognition: Alert    Hospitalization in the last 30 days (Readmission):  Yes    If yes, Readmission Assessment in  Navigator will be completed.    Advance Directives:      Code Status: Full Code   Patient's Primary Decision Maker is: Legal Next of Kin      Discharge Planning:    Patient lives with: Children, Other (Comment) (15 yo daughter) Type of Home: House, Other (Comment) (no steps to enter. 2 story pt states can stay on main level)  Primary Care Giver: Self  Patient Support Systems include: Children, Family Members   Current Financial resources: Other (Comment)  Current community resources: None  Current services prior to admission: None            Current DME:              Type of Home Care services:  None    ADLS  Prior functional level: Independent in ADLs/IADLs  Current functional level: Assistance with the following:, Other (see comment) (TBD having CABG tomorrow)    PT AM-PAC:   /24  OT AM-PAC:   /24    Family can provide assistance at DC: Yes (has 15 yo daughter and states soon to be ex will be assisting)  Would you like Case Management to discuss the discharge plan with any other family members/significant others, and if so, who? No  Plans to Return to Present Housing: Other (see comment) (TBD having CABG

## 2024-07-16 NOTE — PLAN OF CARE
Problem: Safety - Adult  Goal: Free from fall injury  7/16/2024 0053 by Moriah Hirsch RN  Outcome: Progressing  7/15/2024 1417 by Rachel Zeng RN  Outcome: Progressing     Problem: Pain  Goal: Verbalizes/displays adequate comfort level or baseline comfort level  7/16/2024 0053 by Moriah Hirsch RN  Outcome: Progressing  7/15/2024 1417 by Rachel Zeng RN  Outcome: Progressing     Problem: ABCDS Injury Assessment  Goal: Absence of physical injury  Outcome: Progressing

## 2024-07-17 ENCOUNTER — ANESTHESIA EVENT (OUTPATIENT)
Dept: OPERATING ROOM | Age: 49
End: 2024-07-17
Payer: COMMERCIAL

## 2024-07-17 ENCOUNTER — ANESTHESIA (OUTPATIENT)
Dept: OPERATING ROOM | Age: 49
End: 2024-07-17
Payer: COMMERCIAL

## 2024-07-17 ENCOUNTER — APPOINTMENT (OUTPATIENT)
Dept: GENERAL RADIOLOGY | Age: 49
DRG: 231 | End: 2024-07-17
Attending: INTERNAL MEDICINE
Payer: COMMERCIAL

## 2024-07-17 LAB
ACTIVATED CLOTTING TIME: 410 SEC (ref 99–130)
ACTIVATED CLOTTING TIME: 444 SEC (ref 99–130)
ACTIVATED CLOTTING TIME: 448 SEC (ref 99–130)
ACTIVATED CLOTTING TIME: 452 SEC (ref 99–130)
ACTIVATED CLOTTING TIME: 459 SEC (ref 99–130)
ACTIVATED CLOTTING TIME: 460 SEC (ref 99–130)
ACTIVATED CLOTTING TIME: 483 SEC (ref 99–130)
ACTIVATED CLOTTING TIME: 501 SEC (ref 99–130)
ACTIVATED CLOTTING TIME: 548 SEC (ref 99–130)
ACTIVATED CLOTTING TIME: 550 SEC (ref 99–130)
ACTIVATED CLOTTING TIME: 85 SEC (ref 99–130)
ACTIVATED CLOTTING TIME: 86 SEC (ref 99–130)
ANION GAP SERPL CALCULATED.3IONS-SCNC: 8 MMOL/L (ref 3–16)
APTT BLD: 30.4 SEC (ref 22.1–36.4)
APTT BLD: 33.5 SEC (ref 22.1–36.4)
APTT BLD: 80.2 SEC (ref 22.1–36.4)
BASE EXCESS BLDA CALC-SCNC: -1 MMOL/L (ref -3–3)
BASE EXCESS BLDA CALC-SCNC: -2 MMOL/L (ref -3–3)
BASE EXCESS BLDA CALC-SCNC: -2 MMOL/L (ref -3–3)
BASE EXCESS BLDA CALC-SCNC: -3 MMOL/L (ref -3–3)
BASE EXCESS BLDA CALC-SCNC: 0 MMOL/L (ref -3–3)
BASE EXCESS BLDA CALC-SCNC: 3 MMOL/L (ref -3–3)
BASE EXCESS BLDA CALC-SCNC: 5 MMOL/L (ref -3–3)
BASOPHILS # BLD: 0 K/UL (ref 0–0.2)
BASOPHILS NFR BLD: 0.2 %
BUN SERPL-MCNC: 8 MG/DL (ref 7–20)
CA-I BLD-SCNC: 0.95 MMOL/L (ref 1.12–1.32)
CA-I BLD-SCNC: 0.97 MMOL/L (ref 1.12–1.32)
CA-I BLD-SCNC: 0.99 MMOL/L (ref 1.12–1.32)
CA-I BLD-SCNC: 0.99 MMOL/L (ref 1.12–1.32)
CA-I BLD-SCNC: 1 MMOL/L (ref 1.12–1.32)
CA-I BLD-SCNC: 1.02 MMOL/L (ref 1.12–1.32)
CA-I BLD-SCNC: 1.03 MMOL/L (ref 1.12–1.32)
CA-I BLD-SCNC: 1.08 MMOL/L (ref 1.12–1.32)
CA-I BLD-SCNC: 1.09 MMOL/L (ref 1.12–1.32)
CA-I BLD-SCNC: 1.11 MMOL/L (ref 1.12–1.32)
CA-I BLD-SCNC: 1.13 MMOL/L (ref 1.12–1.32)
CALCIUM SERPL-MCNC: 6.8 MG/DL (ref 8.3–10.6)
CHLORIDE SERPL-SCNC: 112 MMOL/L (ref 99–110)
CO2 BLDA-SCNC: 23 MMOL/L
CO2 BLDA-SCNC: 23 MMOL/L
CO2 BLDA-SCNC: 24 MMOL/L
CO2 BLDA-SCNC: 25 MMOL/L
CO2 BLDA-SCNC: 26 MMOL/L
CO2 BLDA-SCNC: 29 MMOL/L
CO2 BLDA-SCNC: 30 MMOL/L
CO2 SERPL-SCNC: 24 MMOL/L (ref 21–32)
CREAT SERPL-MCNC: 0.6 MG/DL (ref 0.9–1.3)
DEPRECATED RDW RBC AUTO: 13.9 % (ref 12.4–15.4)
DEPRECATED RDW RBC AUTO: 14.1 % (ref 12.4–15.4)
DEPRECATED RDW RBC AUTO: 14.2 % (ref 12.4–15.4)
EOSINOPHIL # BLD: 0 K/UL (ref 0–0.6)
EOSINOPHIL NFR BLD: 0.1 %
FIBRINOGEN PPP-MCNC: 268 MG/DL (ref 227–534)
FIBRINOGEN PPP-MCNC: 272 MG/DL (ref 227–534)
GFR SERPLBLD CREATININE-BSD FMLA CKD-EPI: >90 ML/MIN/{1.73_M2}
GLUCOSE BLD-MCNC: 102 MG/DL (ref 70–99)
GLUCOSE BLD-MCNC: 114 MG/DL (ref 70–99)
GLUCOSE BLD-MCNC: 118 MG/DL (ref 70–99)
GLUCOSE BLD-MCNC: 128 MG/DL (ref 70–99)
GLUCOSE BLD-MCNC: 139 MG/DL (ref 70–99)
GLUCOSE BLD-MCNC: 151 MG/DL (ref 70–99)
GLUCOSE BLD-MCNC: 153 MG/DL (ref 70–99)
GLUCOSE BLD-MCNC: 159 MG/DL (ref 70–99)
GLUCOSE BLD-MCNC: 159 MG/DL (ref 70–99)
GLUCOSE BLD-MCNC: 162 MG/DL (ref 70–99)
GLUCOSE BLD-MCNC: 166 MG/DL (ref 70–99)
GLUCOSE BLD-MCNC: 169 MG/DL (ref 70–99)
GLUCOSE BLD-MCNC: 180 MG/DL (ref 70–99)
GLUCOSE BLD-MCNC: 197 MG/DL (ref 70–99)
GLUCOSE BLD-MCNC: 87 MG/DL (ref 70–99)
GLUCOSE SERPL-MCNC: 109 MG/DL (ref 70–99)
HCO3 BLDA-SCNC: 22.1 MMOL/L (ref 21–29)
HCO3 BLDA-SCNC: 22.2 MMOL/L (ref 21–29)
HCO3 BLDA-SCNC: 23.3 MMOL/L (ref 21–29)
HCO3 BLDA-SCNC: 23.9 MMOL/L (ref 21–29)
HCO3 BLDA-SCNC: 24 MMOL/L (ref 21–29)
HCO3 BLDA-SCNC: 24.2 MMOL/L (ref 21–29)
HCO3 BLDA-SCNC: 24.3 MMOL/L (ref 21–29)
HCO3 BLDA-SCNC: 25 MMOL/L (ref 21–29)
HCO3 BLDA-SCNC: 25 MMOL/L (ref 21–29)
HCO3 BLDA-SCNC: 25.1 MMOL/L (ref 21–29)
HCO3 BLDA-SCNC: 27.4 MMOL/L (ref 21–29)
HCO3 BLDA-SCNC: 27.6 MMOL/L (ref 21–29)
HCO3 BLDA-SCNC: 27.8 MMOL/L (ref 21–29)
HCO3 BLDA-SCNC: 28.9 MMOL/L (ref 21–29)
HCT VFR BLD AUTO: 25 % (ref 40.5–52.5)
HCT VFR BLD AUTO: 25 % (ref 40.5–52.5)
HCT VFR BLD AUTO: 26 % (ref 40.5–52.5)
HCT VFR BLD AUTO: 27 % (ref 40.5–52.5)
HCT VFR BLD AUTO: 29 % (ref 40.5–52.5)
HCT VFR BLD AUTO: 30 % (ref 40.5–52.5)
HCT VFR BLD AUTO: 30 % (ref 40.5–52.5)
HCT VFR BLD AUTO: 30.3 % (ref 40.5–52.5)
HCT VFR BLD AUTO: 30.6 % (ref 40.5–52.5)
HCT VFR BLD AUTO: 33 % (ref 40.5–52.5)
HCT VFR BLD AUTO: 37 % (ref 40.5–52.5)
HCT VFR BLD AUTO: 40 % (ref 40.5–52.5)
HCT VFR BLD AUTO: 42.8 % (ref 40.5–52.5)
HGB BLD CALC-MCNC: 10.3 GM/DL (ref 13.5–17.5)
HGB BLD CALC-MCNC: 10.4 GM/DL (ref 13.5–17.5)
HGB BLD CALC-MCNC: 11.2 GM/DL (ref 13.5–17.5)
HGB BLD CALC-MCNC: 12.5 GM/DL (ref 13.5–17.5)
HGB BLD CALC-MCNC: 13.7 GM/DL (ref 13.5–17.5)
HGB BLD CALC-MCNC: 8.4 GM/DL (ref 13.5–17.5)
HGB BLD CALC-MCNC: 8.5 GM/DL (ref 13.5–17.5)
HGB BLD CALC-MCNC: 8.7 GM/DL (ref 13.5–17.5)
HGB BLD CALC-MCNC: 8.7 GM/DL (ref 13.5–17.5)
HGB BLD CALC-MCNC: 8.9 GM/DL (ref 13.5–17.5)
HGB BLD CALC-MCNC: 9 GM/DL (ref 13.5–17.5)
HGB BLD CALC-MCNC: 9.8 GM/DL (ref 13.5–17.5)
HGB BLD-MCNC: 10.5 G/DL (ref 13.5–17.5)
HGB BLD-MCNC: 10.7 G/DL (ref 13.5–17.5)
HGB BLD-MCNC: 14.3 G/DL (ref 13.5–17.5)
INR PPP: 1.37 (ref 0.85–1.15)
INR PPP: 1.39 (ref 0.85–1.15)
LACTATE BLD-SCNC: 0.52 MMOL/L (ref 0.4–2)
LACTATE BLD-SCNC: 0.52 MMOL/L (ref 0.4–2)
LACTATE BLD-SCNC: 0.66 MMOL/L (ref 0.4–2)
LACTATE BLD-SCNC: 0.74 MMOL/L (ref 0.4–2)
LACTATE BLD-SCNC: 0.93 MMOL/L (ref 0.4–2)
LACTATE BLD-SCNC: 1.11 MMOL/L (ref 0.4–2)
LACTATE BLD-SCNC: 1.32 MMOL/L (ref 0.4–2)
LACTATE BLD-SCNC: 1.32 MMOL/L (ref 0.4–2)
LACTATE BLD-SCNC: 1.95 MMOL/L (ref 0.4–2)
LACTATE BLD-SCNC: 2 MMOL/L (ref 0.4–2)
LACTATE BLD-SCNC: 2.27 MMOL/L (ref 0.4–2)
LACTATE BLD-SCNC: <0.3 MMOL/L (ref 0.4–2)
LACTATE BLD-SCNC: <0.3 MMOL/L (ref 0.4–2)
LYMPHOCYTES # BLD: 1.4 K/UL (ref 1–5.1)
LYMPHOCYTES NFR BLD: 8 %
MCH RBC QN AUTO: 28.8 PG (ref 26–34)
MCH RBC QN AUTO: 29.7 PG (ref 26–34)
MCH RBC QN AUTO: 30 PG (ref 26–34)
MCHC RBC AUTO-ENTMCNC: 33.5 G/DL (ref 31–36)
MCHC RBC AUTO-ENTMCNC: 34.5 G/DL (ref 31–36)
MCHC RBC AUTO-ENTMCNC: 35 G/DL (ref 31–36)
MCV RBC AUTO: 85.7 FL (ref 80–100)
MCV RBC AUTO: 86 FL (ref 80–100)
MCV RBC AUTO: 86.1 FL (ref 80–100)
MONOCYTES # BLD: 1 K/UL (ref 0–1.3)
MONOCYTES NFR BLD: 5.3 %
NEUTROPHILS # BLD: 15.6 K/UL (ref 1.7–7.7)
NEUTROPHILS NFR BLD: 86.4 %
PCO2 BLDA: 30 MM HG (ref 35–45)
PCO2 BLDA: 35.1 MM HG (ref 35–45)
PCO2 BLDA: 36.5 MM HG (ref 35–45)
PCO2 BLDA: 38.1 MM HG (ref 35–45)
PCO2 BLDA: 38.6 MM HG (ref 35–45)
PCO2 BLDA: 38.7 MM HG (ref 35–45)
PCO2 BLDA: 39.2 MM HG (ref 35–45)
PCO2 BLDA: 39.8 MM HG (ref 35–45)
PCO2 BLDA: 40.8 MM HG (ref 35–45)
PCO2 BLDA: 41 MM HG (ref 35–45)
PCO2 BLDA: 42.6 MM HG (ref 35–45)
PCO2 BLDA: 43.2 MM HG (ref 35–45)
PCO2 BLDA: 45.4 MM HG (ref 35–45)
PCO2 BLDA: 46.4 MM HG (ref 35–45)
PERFORMED ON: ABNORMAL
PH BLDA: 7.38 [PH] (ref 7.35–7.45)
PH BLDA: 7.38 [PH] (ref 7.35–7.45)
PH BLDA: 7.39 [PH] (ref 7.35–7.45)
PH BLDA: 7.39 [PH] (ref 7.35–7.45)
PH BLDA: 7.4 [PH] (ref 7.35–7.45)
PH BLDA: 7.4 [PH] (ref 7.35–7.45)
PH BLDA: 7.41 [PH] (ref 7.35–7.45)
PH BLDA: 7.43 [PH] (ref 7.35–7.45)
PH BLDA: 7.44 [PH] (ref 7.35–7.45)
PH BLDA: 7.47 [PH] (ref 7.35–7.45)
PLATELET # BLD AUTO: 128 K/UL (ref 135–450)
PLATELET # BLD AUTO: 133 K/UL (ref 135–450)
PLATELET # BLD AUTO: 214 K/UL (ref 135–450)
PMV BLD AUTO: 9 FL (ref 5–10.5)
PMV BLD AUTO: 9 FL (ref 5–10.5)
PMV BLD AUTO: 9.2 FL (ref 5–10.5)
PO2 BLDA: 113.4 MM HG (ref 75–108)
PO2 BLDA: 116 MM HG (ref 75–108)
PO2 BLDA: 123.2 MM HG (ref 75–108)
PO2 BLDA: 135.8 MM HG (ref 75–108)
PO2 BLDA: 140.4 MM HG (ref 75–108)
PO2 BLDA: 339.5 MM HG (ref 75–108)
PO2 BLDA: 345 MM HG (ref 75–108)
PO2 BLDA: 357.5 MM HG (ref 75–108)
PO2 BLDA: 358.7 MM HG (ref 75–108)
PO2 BLDA: 364.8 MM HG (ref 75–108)
PO2 BLDA: 368.2 MM HG (ref 75–108)
PO2 BLDA: 387.9 MM HG (ref 75–108)
PO2 BLDA: 549.4 MM HG (ref 75–108)
PO2 BLDA: 576.5 MM HG (ref 75–108)
POC SAMPLE TYPE: ABNORMAL
POTASSIUM BLD-SCNC: 3.1 MMOL/L (ref 3.5–5.1)
POTASSIUM BLD-SCNC: 3.3 MMOL/L (ref 3.5–5.1)
POTASSIUM BLD-SCNC: 3.6 MMOL/L (ref 3.5–5.1)
POTASSIUM BLD-SCNC: 3.7 MMOL/L (ref 3.5–5.1)
POTASSIUM BLD-SCNC: 3.7 MMOL/L (ref 3.5–5.1)
POTASSIUM BLD-SCNC: 4.3 MMOL/L (ref 3.5–5.1)
POTASSIUM BLD-SCNC: 4.5 MMOL/L (ref 3.5–5.1)
POTASSIUM BLD-SCNC: 4.6 MMOL/L (ref 3.5–5.1)
POTASSIUM BLD-SCNC: 4.7 MMOL/L (ref 3.5–5.1)
POTASSIUM BLD-SCNC: 4.9 MMOL/L (ref 3.5–5.1)
POTASSIUM BLD-SCNC: 5 MMOL/L (ref 3.5–5.1)
POTASSIUM SERPL-SCNC: 3.7 MMOL/L (ref 3.5–5.1)
PROTHROMBIN TIME: 17 SEC (ref 11.9–14.9)
PROTHROMBIN TIME: 17.3 SEC (ref 11.9–14.9)
RBC # BLD AUTO: 3.52 M/UL (ref 4.2–5.9)
RBC # BLD AUTO: 3.57 M/UL (ref 4.2–5.9)
RBC # BLD AUTO: 4.97 M/UL (ref 4.2–5.9)
SAO2 % BLDA: 100 % (ref 93–100)
SAO2 % BLDA: 98 % (ref 93–100)
SAO2 % BLDA: 98 % (ref 93–100)
SAO2 % BLDA: 99 % (ref 93–100)
SODIUM BLD-SCNC: 137 MMOL/L (ref 136–145)
SODIUM BLD-SCNC: 142 MMOL/L (ref 136–145)
SODIUM BLD-SCNC: 142 MMOL/L (ref 136–145)
SODIUM BLD-SCNC: 143 MMOL/L (ref 136–145)
SODIUM BLD-SCNC: 144 MMOL/L (ref 136–145)
SODIUM BLD-SCNC: 147 MMOL/L (ref 136–145)
SODIUM SERPL-SCNC: 144 MMOL/L (ref 136–145)
WBC # BLD AUTO: 15 K/UL (ref 4–11)
WBC # BLD AUTO: 18 K/UL (ref 4–11)
WBC # BLD AUTO: 8.7 K/UL (ref 4–11)

## 2024-07-17 PROCEDURE — 84132 ASSAY OF SERUM POTASSIUM: CPT

## 2024-07-17 PROCEDURE — 33533 CABG ARTERIAL SINGLE: CPT | Performed by: STUDENT IN AN ORGANIZED HEALTH CARE EDUCATION/TRAINING PROGRAM

## 2024-07-17 PROCEDURE — 36415 COLL VENOUS BLD VENIPUNCTURE: CPT

## 2024-07-17 PROCEDURE — 6370000000 HC RX 637 (ALT 250 FOR IP)

## 2024-07-17 PROCEDURE — 6360000002 HC RX W HCPCS: Performed by: STUDENT IN AN ORGANIZED HEALTH CARE EDUCATION/TRAINING PROGRAM

## 2024-07-17 PROCEDURE — 2500000003 HC RX 250 WO HCPCS: Performed by: STUDENT IN AN ORGANIZED HEALTH CARE EDUCATION/TRAINING PROGRAM

## 2024-07-17 PROCEDURE — 85347 COAGULATION TIME ACTIVATED: CPT

## 2024-07-17 PROCEDURE — 02703ZZ DILATION OF CORONARY ARTERY, ONE ARTERY, PERCUTANEOUS APPROACH: ICD-10-PCS | Performed by: STUDENT IN AN ORGANIZED HEALTH CARE EDUCATION/TRAINING PROGRAM

## 2024-07-17 PROCEDURE — 94002 VENT MGMT INPAT INIT DAY: CPT

## 2024-07-17 PROCEDURE — 2580000003 HC RX 258: Performed by: STUDENT IN AN ORGANIZED HEALTH CARE EDUCATION/TRAINING PROGRAM

## 2024-07-17 PROCEDURE — 33508 ENDOSCOPIC VEIN HARVEST: CPT | Performed by: STUDENT IN AN ORGANIZED HEALTH CARE EDUCATION/TRAINING PROGRAM

## 2024-07-17 PROCEDURE — 82947 ASSAY GLUCOSE BLOOD QUANT: CPT

## 2024-07-17 PROCEDURE — 3700000000 HC ANESTHESIA ATTENDED CARE: Performed by: STUDENT IN AN ORGANIZED HEALTH CARE EDUCATION/TRAINING PROGRAM

## 2024-07-17 PROCEDURE — C1751 CATH, INF, PER/CENT/MIDLINE: HCPCS | Performed by: STUDENT IN AN ORGANIZED HEALTH CARE EDUCATION/TRAINING PROGRAM

## 2024-07-17 PROCEDURE — 3600000018 HC SURGERY OHS ADDTL 15MIN: Performed by: STUDENT IN AN ORGANIZED HEALTH CARE EDUCATION/TRAINING PROGRAM

## 2024-07-17 PROCEDURE — 71045 X-RAY EXAM CHEST 1 VIEW: CPT

## 2024-07-17 PROCEDURE — 85384 FIBRINOGEN ACTIVITY: CPT

## 2024-07-17 PROCEDURE — 2720000010 HC SURG SUPPLY STERILE: Performed by: STUDENT IN AN ORGANIZED HEALTH CARE EDUCATION/TRAINING PROGRAM

## 2024-07-17 PROCEDURE — P9045 ALBUMIN (HUMAN), 5%, 250 ML: HCPCS

## 2024-07-17 PROCEDURE — C1729 CATH, DRAINAGE: HCPCS | Performed by: STUDENT IN AN ORGANIZED HEALTH CARE EDUCATION/TRAINING PROGRAM

## 2024-07-17 PROCEDURE — 7100000011 HC PHASE II RECOVERY - ADDTL 15 MIN

## 2024-07-17 PROCEDURE — 06BP4ZZ EXCISION OF RIGHT SAPHENOUS VEIN, PERCUTANEOUS ENDOSCOPIC APPROACH: ICD-10-PCS | Performed by: STUDENT IN AN ORGANIZED HEALTH CARE EDUCATION/TRAINING PROGRAM

## 2024-07-17 PROCEDURE — 6370000000 HC RX 637 (ALT 250 FOR IP): Performed by: ANESTHESIOLOGY

## 2024-07-17 PROCEDURE — 5A1221Z PERFORMANCE OF CARDIAC OUTPUT, CONTINUOUS: ICD-10-PCS | Performed by: STUDENT IN AN ORGANIZED HEALTH CARE EDUCATION/TRAINING PROGRAM

## 2024-07-17 PROCEDURE — B24BZZ4 ULTRASONOGRAPHY OF HEART WITH AORTA, TRANSESOPHAGEAL: ICD-10-PCS | Performed by: STUDENT IN AN ORGANIZED HEALTH CARE EDUCATION/TRAINING PROGRAM

## 2024-07-17 PROCEDURE — 85610 PROTHROMBIN TIME: CPT

## 2024-07-17 PROCEDURE — 84295 ASSAY OF SERUM SODIUM: CPT

## 2024-07-17 PROCEDURE — 2500000003 HC RX 250 WO HCPCS: Performed by: ANESTHESIOLOGY

## 2024-07-17 PROCEDURE — 33519 CABG ARTERY-VEIN THREE: CPT | Performed by: STUDENT IN AN ORGANIZED HEALTH CARE EDUCATION/TRAINING PROGRAM

## 2024-07-17 PROCEDURE — 2709999900 HC NON-CHARGEABLE SUPPLY: Performed by: STUDENT IN AN ORGANIZED HEALTH CARE EDUCATION/TRAINING PROGRAM

## 2024-07-17 PROCEDURE — 3700000001 HC ADD 15 MINUTES (ANESTHESIA): Performed by: STUDENT IN AN ORGANIZED HEALTH CARE EDUCATION/TRAINING PROGRAM

## 2024-07-17 PROCEDURE — 6360000002 HC RX W HCPCS

## 2024-07-17 PROCEDURE — 82330 ASSAY OF CALCIUM: CPT

## 2024-07-17 PROCEDURE — 82803 BLOOD GASES ANY COMBINATION: CPT

## 2024-07-17 PROCEDURE — 80048 BASIC METABOLIC PNL TOTAL CA: CPT

## 2024-07-17 PROCEDURE — 2100000000 HC CCU R&B

## 2024-07-17 PROCEDURE — 2580000003 HC RX 258

## 2024-07-17 PROCEDURE — 0212099 BYPASS CORONARY ARTERY, THREE ARTERIES FROM LEFT INTERNAL MAMMARY WITH AUTOLOGOUS VENOUS TISSUE, OPEN APPROACH: ICD-10-PCS | Performed by: STUDENT IN AN ORGANIZED HEALTH CARE EDUCATION/TRAINING PROGRAM

## 2024-07-17 PROCEDURE — 6370000000 HC RX 637 (ALT 250 FOR IP): Performed by: STUDENT IN AN ORGANIZED HEALTH CARE EDUCATION/TRAINING PROGRAM

## 2024-07-17 PROCEDURE — 85027 COMPLETE CBC AUTOMATED: CPT

## 2024-07-17 PROCEDURE — 85014 HEMATOCRIT: CPT

## 2024-07-17 PROCEDURE — 83605 ASSAY OF LACTIC ACID: CPT

## 2024-07-17 PROCEDURE — 2000000000 HC ICU R&B

## 2024-07-17 PROCEDURE — 2580000003 HC RX 258: Performed by: ANESTHESIOLOGY

## 2024-07-17 PROCEDURE — 94010 BREATHING CAPACITY TEST: CPT | Performed by: INTERNAL MEDICINE

## 2024-07-17 PROCEDURE — 2700000000 HC OXYGEN THERAPY PER DAY

## 2024-07-17 PROCEDURE — A4217 STERILE WATER/SALINE, 500 ML: HCPCS | Performed by: STUDENT IN AN ORGANIZED HEALTH CARE EDUCATION/TRAINING PROGRAM

## 2024-07-17 PROCEDURE — 7100000010 HC PHASE II RECOVERY - FIRST 15 MIN

## 2024-07-17 PROCEDURE — 85025 COMPLETE CBC W/AUTO DIFF WBC: CPT

## 2024-07-17 PROCEDURE — 6360000002 HC RX W HCPCS: Performed by: ANESTHESIOLOGY

## 2024-07-17 PROCEDURE — 94799 UNLISTED PULMONARY SVC/PX: CPT

## 2024-07-17 PROCEDURE — 3600000008 HC SURGERY OHS BASE: Performed by: STUDENT IN AN ORGANIZED HEALTH CARE EDUCATION/TRAINING PROGRAM

## 2024-07-17 PROCEDURE — 94761 N-INVAS EAR/PLS OXIMETRY MLT: CPT

## 2024-07-17 PROCEDURE — 85730 THROMBOPLASTIN TIME PARTIAL: CPT

## 2024-07-17 PROCEDURE — 2500000003 HC RX 250 WO HCPCS

## 2024-07-17 PROCEDURE — C1713 ANCHOR/SCREW BN/BN,TIS/BN: HCPCS | Performed by: STUDENT IN AN ORGANIZED HEALTH CARE EDUCATION/TRAINING PROGRAM

## 2024-07-17 DEVICE — CABLE STRNL TAPR 3 BLNT 1 CUT EDGE NDL SS: Type: IMPLANTABLE DEVICE | Site: STERNUM | Status: FUNCTIONAL

## 2024-07-17 RX ORDER — ONDANSETRON 2 MG/ML
INJECTION INTRAMUSCULAR; INTRAVENOUS PRN
Status: DISCONTINUED | OUTPATIENT
Start: 2024-07-17 | End: 2024-07-17

## 2024-07-17 RX ORDER — ENOXAPARIN SODIUM 100 MG/ML
40 INJECTION SUBCUTANEOUS DAILY
Status: DISCONTINUED | OUTPATIENT
Start: 2024-07-18 | End: 2024-07-23 | Stop reason: HOSPADM

## 2024-07-17 RX ORDER — PANTOPRAZOLE SODIUM 40 MG/1
40 TABLET, DELAYED RELEASE ORAL DAILY
Status: DISCONTINUED | OUTPATIENT
Start: 2024-07-17 | End: 2024-07-21

## 2024-07-17 RX ORDER — MAGNESIUM SULFATE IN WATER 40 MG/ML
2000 INJECTION, SOLUTION INTRAVENOUS PRN
Status: DISCONTINUED | OUTPATIENT
Start: 2024-07-17 | End: 2024-07-23 | Stop reason: HOSPADM

## 2024-07-17 RX ORDER — SODIUM CHLORIDE, SODIUM LACTATE, POTASSIUM CHLORIDE, CALCIUM CHLORIDE 600; 310; 30; 20 MG/100ML; MG/100ML; MG/100ML; MG/100ML
INJECTION, SOLUTION INTRAVENOUS CONTINUOUS
Status: DISCONTINUED | OUTPATIENT
Start: 2024-07-17 | End: 2024-07-19

## 2024-07-17 RX ORDER — LIDOCAINE HYDROCHLORIDE 20 MG/ML
INJECTION, SOLUTION EPIDURAL; INFILTRATION; INTRACAUDAL; PERINEURAL PRN
Status: DISCONTINUED | OUTPATIENT
Start: 2024-07-17 | End: 2024-07-17 | Stop reason: SDUPTHER

## 2024-07-17 RX ORDER — DIPHENHYDRAMINE HCL 25 MG
12.5 TABLET ORAL NIGHTLY PRN
Status: DISCONTINUED | OUTPATIENT
Start: 2024-07-17 | End: 2024-07-23 | Stop reason: HOSPADM

## 2024-07-17 RX ORDER — NOREPINEPHRINE BITARTRATE 0.06 MG/ML
.01-3.3 INJECTION, SOLUTION INTRAVENOUS CONTINUOUS PRN
Status: DISCONTINUED | OUTPATIENT
Start: 2024-07-17 | End: 2024-07-19

## 2024-07-17 RX ORDER — POTASSIUM CHLORIDE 29.8 MG/ML
20 INJECTION INTRAVENOUS PRN
Status: DISCONTINUED | OUTPATIENT
Start: 2024-07-17 | End: 2024-07-23 | Stop reason: HOSPADM

## 2024-07-17 RX ORDER — PROPOFOL 10 MG/ML
5-50 INJECTION, EMULSION INTRAVENOUS CONTINUOUS PRN
Status: DISCONTINUED | OUTPATIENT
Start: 2024-07-17 | End: 2024-07-18

## 2024-07-17 RX ORDER — ATORVASTATIN CALCIUM 80 MG/1
80 TABLET, FILM COATED ORAL NIGHTLY
Status: DISCONTINUED | OUTPATIENT
Start: 2024-07-18 | End: 2024-07-23 | Stop reason: HOSPADM

## 2024-07-17 RX ORDER — GLUCAGON 1 MG/ML
1 KIT INJECTION PRN
Status: DISCONTINUED | OUTPATIENT
Start: 2024-07-17 | End: 2024-07-23 | Stop reason: HOSPADM

## 2024-07-17 RX ORDER — VANCOMYCIN HYDROCHLORIDE 1 G/20ML
INJECTION, POWDER, LYOPHILIZED, FOR SOLUTION INTRAVENOUS PRN
Status: DISCONTINUED | OUTPATIENT
Start: 2024-07-17 | End: 2024-07-17 | Stop reason: SDUPTHER

## 2024-07-17 RX ORDER — HYDROMORPHONE HYDROCHLORIDE 1 MG/ML
0.25 INJECTION, SOLUTION INTRAMUSCULAR; INTRAVENOUS; SUBCUTANEOUS
Status: DISCONTINUED | OUTPATIENT
Start: 2024-07-17 | End: 2024-07-21

## 2024-07-17 RX ORDER — COLCHICINE 0.6 MG/1
0.3 TABLET ORAL 2 TIMES DAILY
Status: DISCONTINUED | OUTPATIENT
Start: 2024-07-18 | End: 2024-07-23 | Stop reason: HOSPADM

## 2024-07-17 RX ORDER — SODIUM CHLORIDE 0.9 % (FLUSH) 0.9 %
5-40 SYRINGE (ML) INJECTION PRN
Status: DISCONTINUED | OUTPATIENT
Start: 2024-07-17 | End: 2024-07-23 | Stop reason: HOSPADM

## 2024-07-17 RX ORDER — SODIUM CHLORIDE 9 MG/ML
INJECTION, SOLUTION INTRAVENOUS PRN
Status: DISCONTINUED | OUTPATIENT
Start: 2024-07-17 | End: 2024-07-23 | Stop reason: HOSPADM

## 2024-07-17 RX ORDER — SODIUM CHLORIDE 0.9 % (FLUSH) 0.9 %
5-40 SYRINGE (ML) INJECTION EVERY 12 HOURS SCHEDULED
Status: DISCONTINUED | OUTPATIENT
Start: 2024-07-17 | End: 2024-07-23 | Stop reason: HOSPADM

## 2024-07-17 RX ORDER — CARDIOPLEGIC SOLUTION NO.16 26/1052.8
PLASTIC BAG, PERFUSION (ML) PERFUSION CONTINUOUS
Status: DISCONTINUED | OUTPATIENT
Start: 2024-07-17 | End: 2024-07-17 | Stop reason: HOSPADM

## 2024-07-17 RX ORDER — PHENYLEPHRINE HCL IN 0.9% NACL 1 MG/10 ML
SYRINGE (ML) INTRAVENOUS PRN
Status: DISCONTINUED | OUTPATIENT
Start: 2024-07-17 | End: 2024-07-17 | Stop reason: SDUPTHER

## 2024-07-17 RX ORDER — METHADONE HYDROCHLORIDE 10 MG/ML
20 INJECTION, SOLUTION INTRAMUSCULAR; INTRAVENOUS; SUBCUTANEOUS ONCE
Status: COMPLETED | OUTPATIENT
Start: 2024-07-17 | End: 2024-07-17

## 2024-07-17 RX ORDER — SENNA AND DOCUSATE SODIUM 50; 8.6 MG/1; MG/1
1 TABLET, FILM COATED ORAL 2 TIMES DAILY
Status: DISCONTINUED | OUTPATIENT
Start: 2024-07-18 | End: 2024-07-23 | Stop reason: HOSPADM

## 2024-07-17 RX ORDER — OXYCODONE HYDROCHLORIDE 5 MG/1
5 TABLET ORAL EVERY 4 HOURS PRN
Status: DISCONTINUED | OUTPATIENT
Start: 2024-07-17 | End: 2024-07-23 | Stop reason: HOSPADM

## 2024-07-17 RX ORDER — CEFAZOLIN SODIUM 1 G/3ML
INJECTION, POWDER, FOR SOLUTION INTRAMUSCULAR; INTRAVENOUS PRN
Status: DISCONTINUED | OUTPATIENT
Start: 2024-07-17 | End: 2024-07-17 | Stop reason: SDUPTHER

## 2024-07-17 RX ORDER — OXYCODONE HYDROCHLORIDE 5 MG/1
10 TABLET ORAL EVERY 4 HOURS PRN
Status: DISCONTINUED | OUTPATIENT
Start: 2024-07-17 | End: 2024-07-21

## 2024-07-17 RX ORDER — POLYETHYLENE GLYCOL 3350 17 G/17G
17 POWDER, FOR SOLUTION ORAL DAILY
Status: DISCONTINUED | OUTPATIENT
Start: 2024-07-18 | End: 2024-07-23 | Stop reason: HOSPADM

## 2024-07-17 RX ORDER — ALBUMIN, HUMAN INJ 5% 5 %
25 SOLUTION INTRAVENOUS PRN
Status: DISCONTINUED | OUTPATIENT
Start: 2024-07-17 | End: 2024-07-20

## 2024-07-17 RX ORDER — MILRINONE LACTATE 0.2 MG/ML
INJECTION, SOLUTION INTRAVENOUS CONTINUOUS PRN
Status: DISCONTINUED | OUTPATIENT
Start: 2024-07-17 | End: 2024-07-17 | Stop reason: SDUPTHER

## 2024-07-17 RX ORDER — HEPARIN SODIUM 1000 [USP'U]/ML
INJECTION, SOLUTION INTRAVENOUS; SUBCUTANEOUS PRN
Status: DISCONTINUED | OUTPATIENT
Start: 2024-07-17 | End: 2024-07-17 | Stop reason: SDUPTHER

## 2024-07-17 RX ORDER — ONDANSETRON 2 MG/ML
4 INJECTION INTRAMUSCULAR; INTRAVENOUS EVERY 6 HOURS PRN
Status: DISCONTINUED | OUTPATIENT
Start: 2024-07-17 | End: 2024-07-23 | Stop reason: HOSPADM

## 2024-07-17 RX ORDER — DEXMEDETOMIDINE HYDROCHLORIDE 4 UG/ML
INJECTION, SOLUTION INTRAVENOUS CONTINUOUS PRN
Status: DISCONTINUED | OUTPATIENT
Start: 2024-07-17 | End: 2024-07-17 | Stop reason: SDUPTHER

## 2024-07-17 RX ORDER — HYDROMORPHONE HYDROCHLORIDE 1 MG/ML
0.1 INJECTION, SOLUTION INTRAMUSCULAR; INTRAVENOUS; SUBCUTANEOUS
Status: DISCONTINUED | OUTPATIENT
Start: 2024-07-17 | End: 2024-07-21

## 2024-07-17 RX ORDER — BISACODYL 10 MG
10 SUPPOSITORY, RECTAL RECTAL EVERY 6 HOURS PRN
Status: DISCONTINUED | OUTPATIENT
Start: 2024-07-17 | End: 2024-07-23 | Stop reason: HOSPADM

## 2024-07-17 RX ORDER — PROPOFOL 10 MG/ML
INJECTION, EMULSION INTRAVENOUS PRN
Status: DISCONTINUED | OUTPATIENT
Start: 2024-07-17 | End: 2024-07-17 | Stop reason: SDUPTHER

## 2024-07-17 RX ORDER — DOBUTAMINE HYDROCHLORIDE 200 MG/100ML
2-10 INJECTION INTRAVENOUS CONTINUOUS PRN
Status: DISCONTINUED | OUTPATIENT
Start: 2024-07-17 | End: 2024-07-19

## 2024-07-17 RX ORDER — DEXMEDETOMIDINE HYDROCHLORIDE 4 UG/ML
.1-1.5 INJECTION, SOLUTION INTRAVENOUS CONTINUOUS PRN
Status: DISCONTINUED | OUTPATIENT
Start: 2024-07-17 | End: 2024-07-19

## 2024-07-17 RX ORDER — VECURONIUM BROMIDE 1 MG/ML
INJECTION, POWDER, LYOPHILIZED, FOR SOLUTION INTRAVENOUS PRN
Status: DISCONTINUED | OUTPATIENT
Start: 2024-07-17 | End: 2024-07-17 | Stop reason: SDUPTHER

## 2024-07-17 RX ORDER — DEXTROSE MONOHYDRATE 100 MG/ML
INJECTION, SOLUTION INTRAVENOUS CONTINUOUS PRN
Status: DISCONTINUED | OUTPATIENT
Start: 2024-07-17 | End: 2024-07-23 | Stop reason: HOSPADM

## 2024-07-17 RX ORDER — ONDANSETRON 4 MG/1
4 TABLET, ORALLY DISINTEGRATING ORAL EVERY 8 HOURS PRN
Status: DISCONTINUED | OUTPATIENT
Start: 2024-07-17 | End: 2024-07-23 | Stop reason: HOSPADM

## 2024-07-17 RX ORDER — PROTAMINE SULFATE 10 MG/ML
INJECTION, SOLUTION INTRAVENOUS PRN
Status: DISCONTINUED | OUTPATIENT
Start: 2024-07-17 | End: 2024-07-17 | Stop reason: SDUPTHER

## 2024-07-17 RX ORDER — ASPIRIN 81 MG/1
81 TABLET ORAL DAILY
Status: DISCONTINUED | OUTPATIENT
Start: 2024-07-17 | End: 2024-07-23 | Stop reason: HOSPADM

## 2024-07-17 RX ORDER — TAMSULOSIN HYDROCHLORIDE 0.4 MG/1
0.4 CAPSULE ORAL DAILY
Status: DISCONTINUED | OUTPATIENT
Start: 2024-07-17 | End: 2024-07-23 | Stop reason: HOSPADM

## 2024-07-17 RX ORDER — MIDAZOLAM HYDROCHLORIDE 1 MG/ML
INJECTION INTRAMUSCULAR; INTRAVENOUS PRN
Status: DISCONTINUED | OUTPATIENT
Start: 2024-07-17 | End: 2024-07-17 | Stop reason: SDUPTHER

## 2024-07-17 RX ORDER — POTASSIUM CHLORIDE 20 MEQ/1
40 TABLET, EXTENDED RELEASE ORAL PRN
Status: DISCONTINUED | OUTPATIENT
Start: 2024-07-18 | End: 2024-07-23 | Stop reason: HOSPADM

## 2024-07-17 RX ORDER — ESMOLOL HYDROCHLORIDE 10 MG/ML
INJECTION INTRAVENOUS PRN
Status: DISCONTINUED | OUTPATIENT
Start: 2024-07-17 | End: 2024-07-17 | Stop reason: SDUPTHER

## 2024-07-17 RX ORDER — MILRINONE LACTATE 0.2 MG/ML
.1-.75 INJECTION, SOLUTION INTRAVENOUS CONTINUOUS PRN
Status: DISCONTINUED | OUTPATIENT
Start: 2024-07-17 | End: 2024-07-18

## 2024-07-17 RX ORDER — LIDOCAINE 4 G/G
1 PATCH TOPICAL EVERY 12 HOURS
Status: DISCONTINUED | OUTPATIENT
Start: 2024-07-17 | End: 2024-07-23 | Stop reason: HOSPADM

## 2024-07-17 RX ORDER — ASPIRIN 300 MG/1
300 SUPPOSITORY RECTAL
Status: ACTIVE | OUTPATIENT
Start: 2024-07-17 | End: 2024-07-18

## 2024-07-17 RX ORDER — ACETAMINOPHEN 500 MG
1000 TABLET ORAL EVERY 8 HOURS SCHEDULED
Status: DISCONTINUED | OUTPATIENT
Start: 2024-07-17 | End: 2024-07-23 | Stop reason: HOSPADM

## 2024-07-17 RX ORDER — ALPRAZOLAM 0.25 MG/1
0.25 TABLET ORAL ONCE
Status: DISCONTINUED | OUTPATIENT
Start: 2024-07-17 | End: 2024-07-17

## 2024-07-17 RX ADMIN — MILRINONE LACTATE 0.25 MCG/KG/MIN: 0.2 INJECTION, SOLUTION INTRAVENOUS at 19:10

## 2024-07-17 RX ADMIN — SODIUM CHLORIDE 3 UNITS/HR: 9 INJECTION, SOLUTION INTRAVENOUS at 08:02

## 2024-07-17 RX ADMIN — VANCOMYCIN HYDROCHLORIDE 1000 MG: 1 INJECTION, POWDER, LYOPHILIZED, FOR SOLUTION INTRAVENOUS at 07:09

## 2024-07-17 RX ADMIN — METHADONE HYDROCHLORIDE 10 MG: 10 INJECTION, SOLUTION INTRAMUSCULAR; INTRAVENOUS; SUBCUTANEOUS at 07:35

## 2024-07-17 RX ADMIN — Medication 100 MCG: at 11:16

## 2024-07-17 RX ADMIN — SODIUM CHLORIDE, POTASSIUM CHLORIDE, SODIUM LACTATE AND CALCIUM CHLORIDE: 600; 310; 30; 20 INJECTION, SOLUTION INTRAVENOUS at 19:46

## 2024-07-17 RX ADMIN — SODIUM CHLORIDE, POTASSIUM CHLORIDE, SODIUM LACTATE AND CALCIUM CHLORIDE: 600; 310; 30; 20 INJECTION, SOLUTION INTRAVENOUS at 11:33

## 2024-07-17 RX ADMIN — ONDANSETRON 4 MG: 2 INJECTION INTRAMUSCULAR; INTRAVENOUS at 16:59

## 2024-07-17 RX ADMIN — EPINEPHRINE 4 MCG/MIN: 1 INJECTION INTRAMUSCULAR; INTRAVENOUS; SUBCUTANEOUS at 14:35

## 2024-07-17 RX ADMIN — PROPOFOL 50 MCG/KG/MIN: 10 INJECTION, EMULSION INTRAVENOUS at 11:19

## 2024-07-17 RX ADMIN — PROTAMINE SULFATE 250 MG: 10 INJECTION, SOLUTION INTRAVENOUS at 15:16

## 2024-07-17 RX ADMIN — DEXMEDETOMIDINE HYDROCHLORIDE 0.7 MCG/KG/HR: 400 INJECTION, SOLUTION INTRAVENOUS at 08:40

## 2024-07-17 RX ADMIN — Medication 200 MCG: at 07:39

## 2024-07-17 RX ADMIN — WATER 2000 MG: 1 INJECTION INTRAMUSCULAR; INTRAVENOUS; SUBCUTANEOUS at 21:14

## 2024-07-17 RX ADMIN — METHADONE HYDROCHLORIDE 10 MG: 10 INJECTION, SOLUTION INTRAMUSCULAR; INTRAVENOUS; SUBCUTANEOUS at 08:42

## 2024-07-17 RX ADMIN — SODIUM CHLORIDE 2 UNITS/HR: 9 INJECTION, SOLUTION INTRAVENOUS at 19:45

## 2024-07-17 RX ADMIN — VECURONIUM BROMIDE 20 MG: 1 INJECTION, POWDER, LYOPHILIZED, FOR SOLUTION INTRAVENOUS at 07:35

## 2024-07-17 RX ADMIN — AMINOCAPROIC ACID 5 G/HR: 250 INJECTION, SOLUTION INTRAVENOUS at 08:22

## 2024-07-17 RX ADMIN — HEPARIN SODIUM 26000 UNITS: 1000 INJECTION INTRAVENOUS; SUBCUTANEOUS at 10:05

## 2024-07-17 RX ADMIN — PROTAMINE SULFATE 150 MG: 10 INJECTION, SOLUTION INTRAVENOUS at 15:17

## 2024-07-17 RX ADMIN — OXYCODONE 10 MG: 5 TABLET ORAL at 22:34

## 2024-07-17 RX ADMIN — HEPARIN SODIUM 10000 UNITS: 1000 INJECTION INTRAVENOUS; SUBCUTANEOUS at 10:14

## 2024-07-17 RX ADMIN — HEPARIN SODIUM 5000 UNITS: 1000 INJECTION INTRAVENOUS; SUBCUTANEOUS at 09:30

## 2024-07-17 RX ADMIN — PROPOFOL 50 MCG/KG/MIN: 10 INJECTION, EMULSION INTRAVENOUS at 19:09

## 2024-07-17 RX ADMIN — ALBUMIN (HUMAN) 12.5 G: 12.5 INJECTION, SOLUTION INTRAVENOUS at 21:20

## 2024-07-17 RX ADMIN — MUPIROCIN: 20 OINTMENT TOPICAL at 21:11

## 2024-07-17 RX ADMIN — VANCOMYCIN HYDROCHLORIDE 1000 MG: 1 INJECTION, POWDER, LYOPHILIZED, FOR SOLUTION INTRAVENOUS at 07:59

## 2024-07-17 RX ADMIN — CHLORHEXIDINE GLUCONATE 15 ML: 1.2 RINSE ORAL at 05:26

## 2024-07-17 RX ADMIN — LIDOCAINE HYDROCHLORIDE 5 ML: 20 INJECTION, SOLUTION EPIDURAL; INFILTRATION; INTRACAUDAL; PERINEURAL at 07:35

## 2024-07-17 RX ADMIN — INSULIN HUMAN 2 UNITS: 100 INJECTION, SOLUTION PARENTERAL at 13:21

## 2024-07-17 RX ADMIN — VECURONIUM BROMIDE 10 MG: 1 INJECTION, POWDER, LYOPHILIZED, FOR SOLUTION INTRAVENOUS at 10:37

## 2024-07-17 RX ADMIN — VECURONIUM BROMIDE 10 MG: 1 INJECTION, POWDER, LYOPHILIZED, FOR SOLUTION INTRAVENOUS at 13:21

## 2024-07-17 RX ADMIN — ALBUMIN (HUMAN) 25 G: 12.5 INJECTION, SOLUTION INTRAVENOUS at 17:45

## 2024-07-17 RX ADMIN — PANTOPRAZOLE SODIUM 40 MG: 40 INJECTION, POWDER, FOR SOLUTION INTRAVENOUS at 05:26

## 2024-07-17 RX ADMIN — SODIUM CHLORIDE, PRESERVATIVE FREE 10 ML: 5 INJECTION INTRAVENOUS at 22:34

## 2024-07-17 RX ADMIN — NOREPINEPHRINE BITARTRATE 2 MCG/MIN: 1 INJECTION, SOLUTION, CONCENTRATE INTRAVENOUS at 07:46

## 2024-07-17 RX ADMIN — DEXMEDETOMIDINE HYDROCHLORIDE 0.7 MCG/KG/HR: 400 INJECTION, SOLUTION INTRAVENOUS at 19:08

## 2024-07-17 RX ADMIN — MIDAZOLAM 4 MG: 1 INJECTION INTRAMUSCULAR; INTRAVENOUS at 07:29

## 2024-07-17 RX ADMIN — HYDROMORPHONE HYDROCHLORIDE 0.25 MG: 1 INJECTION, SOLUTION INTRAMUSCULAR; INTRAVENOUS; SUBCUTANEOUS at 22:10

## 2024-07-17 RX ADMIN — CEFAZOLIN 2 G: 1 INJECTION, POWDER, FOR SOLUTION INTRAMUSCULAR; INTRAVENOUS at 12:28

## 2024-07-17 RX ADMIN — CEFAZOLIN 2000 MG: 2 INJECTION, POWDER, FOR SOLUTION INTRAMUSCULAR; INTRAVENOUS at 08:00

## 2024-07-17 RX ADMIN — HEPARIN SODIUM 5000 UNITS: 1000 INJECTION INTRAVENOUS; SUBCUTANEOUS at 10:42

## 2024-07-17 RX ADMIN — SODIUM CHLORIDE, POTASSIUM CHLORIDE, SODIUM LACTATE AND CALCIUM CHLORIDE: 600; 310; 30; 20 INJECTION, SOLUTION INTRAVENOUS at 07:28

## 2024-07-17 RX ADMIN — ACETAMINOPHEN 1000 MG: 500 TABLET ORAL at 22:34

## 2024-07-17 RX ADMIN — SODIUM CHLORIDE, POTASSIUM CHLORIDE, SODIUM LACTATE AND CALCIUM CHLORIDE: 600; 310; 30; 20 INJECTION, SOLUTION INTRAVENOUS at 19:11

## 2024-07-17 RX ADMIN — ASPIRIN 81 MG: 81 TABLET, COATED ORAL at 22:33

## 2024-07-17 RX ADMIN — Medication 100 MCG: at 15:48

## 2024-07-17 RX ADMIN — CEFAZOLIN 1 G: 1 INJECTION, POWDER, FOR SOLUTION INTRAMUSCULAR; INTRAVENOUS at 16:28

## 2024-07-17 RX ADMIN — PROPOFOL 120 MG: 10 INJECTION, EMULSION INTRAVENOUS at 07:35

## 2024-07-17 RX ADMIN — SUGAMMADEX 200 MG: 100 INJECTION, SOLUTION INTRAVENOUS at 16:59

## 2024-07-17 RX ADMIN — ESMOLOL HYDROCHLORIDE 20 MG: 10 INJECTION, SOLUTION INTRAVENOUS at 15:26

## 2024-07-17 RX ADMIN — SODIUM CHLORIDE: 9 INJECTION, SOLUTION INTRAVENOUS at 07:28

## 2024-07-17 RX ADMIN — ALBUMIN (HUMAN) 12.5 G: 12.5 INJECTION, SOLUTION INTRAVENOUS at 19:28

## 2024-07-17 RX ADMIN — HEPARIN SODIUM 5000 UNITS: 1000 INJECTION INTRAVENOUS; SUBCUTANEOUS at 11:07

## 2024-07-17 RX ADMIN — Medication 0.02 MCG/KG/MIN: at 19:07

## 2024-07-17 RX ADMIN — POTASSIUM CHLORIDE 20 MEQ: 29.8 INJECTION, SOLUTION INTRAVENOUS at 19:12

## 2024-07-17 RX ADMIN — MILRINONE LACTATE 0.25 MCG/KG/MIN: 200 INJECTION, SOLUTION INTRAVENOUS at 14:35

## 2024-07-17 ASSESSMENT — LIFESTYLE VARIABLES: SMOKING_STATUS: 0

## 2024-07-17 NOTE — ANESTHESIA PROCEDURE NOTES
Central Venous Line:    A central venous line was placed using ultrasound guidance, in the OR for the following indication(s): central venous access and CVP monitoring.7/17/2024 7:44 AM7/17/2024 7:49 AM    Sterility preparation included the following: provider used sterile gloves, gown, hat and mask, hand hygiene performed prior to central venous catheter insertion, sterile gel and probe cover used in ultrasound-guided central venous catheter insertion and maximum sterile barriers used during central venous catheter insertion.    The patient was placed in Trendelenburg position.The right internal jugular vein was prepped.    The site was prepped with Chloraprep.  A 8.5 Fr (size), 15 (length), introducer triple lumen was placed.    During the procedure, the following specific steps were taken: target vein identified, needle advanced into vein and blood aspirated and guidewire advanced into vein.    Intravenous verification was obtained by ultrasound and venous blood return.    Post insertion care included: all ports aspirated, all ports flushed easily, guidewire removed intact, Biopatch applied, line sutured in place and dressing applied.    During the procedure the patient experienced: patient tolerated procedure well with no complications and EBL 0mL.      Outcomes: uncomplicated and patient tolerated procedure well  Anesthesia type: localA(n) non-oximetric, 7 (size) Pulmonary Artery Catheter (PAC) was placed through the Introducer CVL in the right internal jugular vein.  The PAC placement was confirmed by pressure tracing changes and secured at 48 cm (depth).  The patient experienced the following events during the procedure: patient tolerated procedure well with no complications.PA Cath placed?: Yes  Staffing  Performed: Anesthesiologist   Anesthesiologist: MERLENE Luque MD  Performed by: MERLENE Luque MD  Authorized by: MERLENE Luque MD    Preanesthetic Checklist  Completed: patient

## 2024-07-17 NOTE — ANESTHESIA PRE PROCEDURE
Department of Anesthesiology  Preprocedure Note       Name:  Landon Ordoñez   Age:  49 y.o.  :  1975                                          MRN:  [id number]         Date:  2024      Surgeon: Surgeon(s):  Carmela Conti MD    Procedure: Procedure(s):  CORONARY ARTERY BYPASS GRAFT WITH POSSIBLE GRUPO CLIP    Medications prior to admission:   Prior to Admission medications    Medication Sig Start Date End Date Taking? Authorizing Provider   metFORMIN (GLUCOPHAGE-XR) 500 MG extended release tablet Take 4 tablets by mouth daily (with breakfast)   Yes Donald Carmona MD   atorvastatin (LIPITOR) 80 MG tablet Take 1 tablet by mouth daily  Patient not taking: Reported on 2024 4/15/24   Donald Carmona MD   JARDIANCE 10 MG tablet Take 1 tablet by mouth every morning  Patient not taking: Reported on 2024    Donald Carmona MD       Current medications:    Current Facility-Administered Medications   Medication Dose Route Frequency Provider Last Rate Last Admin   • aminocaproic acid (AMICAR) 10,000 mg in sodium chloride 0.9 % 500 mL infusion  1,000 mg/hr IntraVENous Continuous Carmela Conti MD       • insulin regular (HUMULIN R;NOVOLIN R) 100 Units in sodium chloride 0.9 % 100 mL infusion  0.1 Units/kg/hr IntraVENous Continuous PRN Carmela Conti MD       • cardioplegia solution (DEL NIDO) injection   IntraVENous Continuous Carmela Conti MD       • cardioplegia solution (DEL NIDO) injection   IntraVENous Continuous Carmela Conti MD       • cardioplegia solution (DEL NIDO) injection   IntraVENous Continuous Carmela Conti MD       • ALPRAZolam (XANAX) tablet 0.25 mg  0.25 mg Oral Once Pretorius, Terry DAWKINS MD       • insulin lispro (HUMALOG,ADMELOG) injection vial 6 Units  0.08 Units/kg SubCUTAneous TID Nia Beck MD   6 Units at 24 1658   • insulin lispro (HUMALOG,ADMELOG) injection vial 0-8 Units  0-8 Units SubCUTAneous TID Nia Beck MD

## 2024-07-17 NOTE — PROCEDURES
Pulmonary Function Testing      Patient name:  Landon Ordoñez      Unit #:   [id number]   Date of test:  7/15/2024   Date of interpretation:   7/17/2024    Mr. Landon Ordoñez is a 49 y.o. year-old male. The spirometry data were acceptable and reproducible.     Spirometry:  Flow volume loops reveal flattening of the expiratory flow loop with more normal appearing inspiratory flow loop. The FEV-1/FVC ratio was decreased. The pre-bronchodilator FEV-1 was 2.3 liters (65% of predicted), which was moderately decreased. The FVC was 3.83 liters (85% of predicted), which was normal. Response to inhaled bronchodilators (albuterol) was not performed.      Interpretation:  Evidence of moderate obstructive lung disease.  Abnormal flow-volume loops could indicate variable intrathoracic obstruction.    Comments:

## 2024-07-17 NOTE — CONSULTS
Endocrinology  Thanks for calling.  Seen earlier, note written later.    A 49 y.o. type 2 diabetic who is against taking any medications has multivessel CAD and awaits CABG in AM.    Pres Ill: Patient has never treated type 2 DM with regular medication; he has a \"sweet tooth\" and likes sweets, even before he became diabetic.  He has 7- HbA1c 9.8%.  He is unsure about his cholesterol but thinks it is probably high.    Past Hx and ROS:  Neuro: No known stroke  Resp: Neg for smoking  CV: ECG suggests inferior MI, age uncertain, patient thinks possibly acute but little or no chest pain. Cor angio multivessel CAD not suitable for PTCA.  GI: Neg  : Neg  Endocrine: Type 2 DM, no known thyroid idsease    Exam:  Pulse 69 /80; Resp 18 T 98.1 deg F  HEENT: EOM's intact  Neck: Not stiff, no prominent goiter no JVD  Chest: Clear  Heart: S1 and S2  Abd: Nontender  Ext: No cyanosis nor edema    Assessment:  1 Type 2 DM, recalcitrant to therapy, he agrees to consider inhaled insulin or a GLP-1 injection weekly.  2 Multiessel CAD awaiting CABG    Plan:  Will follow, likely with have insulin drip post op per protocol, explained this to patient.  Onsider Afrezza inhaled insulin if acceptable to patient, but still will need control of FBS that may require long acting insulin or perhaps a GLP-1 such as mounjarro.  Encourage better diabetic diet adherence, less sweets.    ILEANA Bauman M.D.    
[facility]   CONSULTATION  [phone]      Chief complaint: Acute coronary syndrome, Non-STEMI        History of Present Illness:  Landon Ordoñez is a 49 y.o. patient transferred from [facility] for CABG consult.  He currently denies chest discomfort or shortness of breath.    Hospital course:   Patient has a PMH of diabetes who came in again for chest pressure and shortness of breath.  The patient is an insulin-dependent diabetic.  He has a family history of atherosclerotic disease.  He is a non-smoker.  He claims to be noncompliant with his medications including insulin.     The patient works as a , third shift.  He states that while walking [place] to the bus station after work he noticed shortness of breath and then some pressure in his chest.  He went home and went to bed but claims to have pressure in his chest which she describes as \"my lungs hurting\".  They are still hurting.  His troponins have gone up to 311.  Is on a heparin drip.  Initial EKG showed ST depressions in V1 V2.      Past Medical History:   has a past medical history of CAD (coronary artery disease), Cardiomyopathy (HCC), and Dyslipidemia.    Surgical History:   has a past surgical history that includes Cardiac procedure (N/A, 7/15/2024) and Cardiac procedure (N/A, 7/15/2024).     Social History:   reports that he has never smoked. He does not have any smokeless tobacco history on file. He reports current alcohol use. He reports that he does not use drugs.     Family History:  family history includes Diabetes in his father and mother; Heart Disease in his father and mother.    Home Medications:  Were reviewed and are listed in nursing record. and/or listed below  Prior to Admission medications    Medication Sig Start Date End Date Taking? Authorizing Provider   metFORMIN (GLUCOPHAGE-XR) 500 MG extended release tablet Take 4 tablets by mouth daily (with breakfast)   Yes Provider, MD Donald   atorvastatin (LIPITOR) 80 
assessed the patient and discussed my recommendations at bedside.    Landon Ordoñez is a 49 y.o. yo male with PMHx of CAD, uncontrolled insulin-dependent T2DM (A1c 9.8), HLD. The patient presented to [facility] with NSTEMI. LHC performed by Dr. Jose on 07/15/2024 demonstrated multivessel CAD with acutely occluded OM2 (s/p successful POBA), 90% mLAD stenosis, 90% D1 stenosis, 75% mRCA stenosis, 70% RPL stenosis. A TTE demonstrated reduced LV function but preserved RV function with EF 45, and no valvular pathology. Troponins have thus far peaked at 1900. I spent a long time discussing Mr. Ordoñez's markedly increased perioperative risk of death, MI, stroke, and reduced graft patency due to his uncontrolled T2DM and an A1C over 8.5. He states he does  not like to take medication. The estimated STS risk for isolated CABG does not take into account the uncontrolled state of his glucose management, is therefore lower than predicted at 1.3% mortality, 6% morbidity or mortality, 1% renal failure, and 1% permanent stroke.     PLAN:  Given history and severity of symptoms, this patient has Class 1A indications for surgical revascularization in the setting of NSTEMI, MV CAD in a young diabetic patient. Non-con CT-chest demonstrated a clampable aorta. US Carotids clear of significant disease. US vein demonstrates adequacy for grafting.     The tentative surgical plan is for 3-4v CABG, poss LAAE tentatively scheduled for tomorrow 7/17/2024. Potential targets included dLAD, diag, OM2, and PDA vs RPL.    I discussed postoperative expectations: Patient to expect to stay in hospital 4-6 days. No driving for 3-4 weeks or until off daytime narcotics. No lifting, pushing, or pulling greater than 10 lbs until 2 months postoperatively. Cardiac rehab is recommended.     Also discussed the risks of postoperative complications: Pleural Effusion - there is a 10% chance that fluid can accumulate around the lungs that can cause shortness of

## 2024-07-17 NOTE — ANESTHESIA PROCEDURE NOTES
Procedure Performed: AYSHA       Start Time:        End Time:      Preanesthesia Checklist:  Patient identified, IV assessed, risks and benefits discussed, monitors and equipment assessed, procedure being performed at surgeon's request and anesthesia consent obtained.    General Procedure Information  Diagnostic Indications for Echo:  assessment of valve function  Physician Requesting Echo: Carmela Conti MD  Location performed:  OR  Intubated  Bite block placed  Heart visualized  Probe Insertion:  Easy  Probe Type:  Mulitplane  Modalities:  2D, color flow mapping, continuous wave Doppler and pulse wave Doppler    Echocardiographic and Doppler Measurements    Ventricles    Right Ventricle:  Cavity size normal.  Hypertrophy not present.  Thrombus not present.  Global function mildly impaired.    Left Ventricle:  Cavity size dilated.  Hypertrophy not present.  Global Function moderately impaired.  Ejection Fraction 35%.      Ventricular Regional Function:  1- Basal Anteroseptal:  normal  2- Basal Anterior:  hypokinetic  3- Basal Anterolateral:  akinetic  4- Basal Inferolateral:  normal  5- Basal Inferior:  normal  6- Basal Inferoseptal:  normal  7- Mid Anteroseptal:  normal  8- Mid Anterior:  hypokinetic  9- Mid Anterolateral:  akinetic  10- Mid Inferolateral:  normal  11- Mid Inferior:  normal  12- Mid Inferoseptal:  normal  13- Apical Anterior:  hypokinetic  14- Apical Lateral:  akinetic  15- Apical Inferior:  normal  16- Apical Septal:  normal  17- Isabella:  hypokinetic    Wall Motion Comments:       Smoke in LV on echo.     Valves    Aortic Valve:  Annulus normal.  Stenosis not present.  Regurgitation mild.  Leaflets normal.  Leaflet motions normal.      Mitral Valve:  Annulus normal.  Stenosis not present.  Regurgitation none.  Leaflets normal.  Leaflet motions normal.      Tricuspid Valve:  Annulus normal.  Stenosis not present.  Regurgitation none.  Leaflets normal.  Leaflet motions normal.    Pulmonic

## 2024-07-18 ENCOUNTER — APPOINTMENT (OUTPATIENT)
Dept: GENERAL RADIOLOGY | Age: 49
DRG: 231 | End: 2024-07-18
Attending: INTERNAL MEDICINE
Payer: COMMERCIAL

## 2024-07-18 LAB
ANION GAP SERPL CALCULATED.3IONS-SCNC: 8 MMOL/L (ref 3–16)
BASE EXCESS BLDA CALC-SCNC: 1 MMOL/L (ref -3–3)
BLOOD BANK DISPENSE STATUS: NORMAL
BLOOD BANK PRODUCT CODE: NORMAL
BPU ID: NORMAL
BUN SERPL-MCNC: 9 MG/DL (ref 7–20)
CALCIUM SERPL-MCNC: 7.3 MG/DL (ref 8.3–10.6)
CHLORIDE SERPL-SCNC: 111 MMOL/L (ref 99–110)
CO2 BLDA-SCNC: 27 MMOL/L
CO2 SERPL-SCNC: 24 MMOL/L (ref 21–32)
CREAT SERPL-MCNC: 0.7 MG/DL (ref 0.9–1.3)
DEPRECATED RDW RBC AUTO: 14.1 % (ref 12.4–15.4)
DESCRIPTION BLOOD BANK: NORMAL
EKG ATRIAL RATE: 112 BPM
EKG DIAGNOSIS: NORMAL
EKG P AXIS: 42 DEGREES
EKG P-R INTERVAL: 136 MS
EKG Q-T INTERVAL: 364 MS
EKG QRS DURATION: 86 MS
EKG QTC CALCULATION (BAZETT): 496 MS
EKG R AXIS: -74 DEGREES
EKG T AXIS: 56 DEGREES
EKG VENTRICULAR RATE: 112 BPM
FIBRINOGEN PPP-MCNC: 375 MG/DL (ref 227–534)
GFR SERPLBLD CREATININE-BSD FMLA CKD-EPI: >90 ML/MIN/{1.73_M2}
GLUCOSE BLD-MCNC: 102 MG/DL (ref 70–99)
GLUCOSE BLD-MCNC: 103 MG/DL (ref 70–99)
GLUCOSE BLD-MCNC: 107 MG/DL (ref 70–99)
GLUCOSE BLD-MCNC: 109 MG/DL (ref 70–99)
GLUCOSE BLD-MCNC: 111 MG/DL (ref 70–99)
GLUCOSE BLD-MCNC: 112 MG/DL (ref 70–99)
GLUCOSE BLD-MCNC: 115 MG/DL (ref 70–99)
GLUCOSE BLD-MCNC: 116 MG/DL (ref 70–99)
GLUCOSE BLD-MCNC: 117 MG/DL (ref 70–99)
GLUCOSE BLD-MCNC: 117 MG/DL (ref 70–99)
GLUCOSE BLD-MCNC: 118 MG/DL (ref 70–99)
GLUCOSE BLD-MCNC: 119 MG/DL (ref 70–99)
GLUCOSE BLD-MCNC: 121 MG/DL (ref 70–99)
GLUCOSE BLD-MCNC: 124 MG/DL (ref 70–99)
GLUCOSE BLD-MCNC: 129 MG/DL (ref 70–99)
GLUCOSE BLD-MCNC: 132 MG/DL (ref 70–99)
GLUCOSE BLD-MCNC: 149 MG/DL (ref 70–99)
GLUCOSE BLD-MCNC: 95 MG/DL (ref 70–99)
GLUCOSE SERPL-MCNC: 99 MG/DL (ref 70–99)
HCO3 BLDA-SCNC: 26 MMOL/L (ref 21–29)
HCT VFR BLD AUTO: 27.3 % (ref 40.5–52.5)
HGB BLD-MCNC: 8.9 G/DL (ref 13.5–17.5)
MAGNESIUM SERPL-MCNC: 2.5 MG/DL (ref 1.8–2.4)
MCH RBC QN AUTO: 28.4 PG (ref 26–34)
MCHC RBC AUTO-ENTMCNC: 32.8 G/DL (ref 31–36)
MCV RBC AUTO: 86.8 FL (ref 80–100)
PCO2 BLDA: 42.7 MM HG (ref 35–45)
PERFORMED ON: ABNORMAL
PERFORMED ON: NORMAL
PH BLDA: 7.39 [PH] (ref 7.35–7.45)
PLATELET # BLD AUTO: 143 K/UL (ref 135–450)
PMV BLD AUTO: 9.1 FL (ref 5–10.5)
PO2 BLDA: 86.6 MM HG (ref 75–108)
POC SAMPLE TYPE: ABNORMAL
POTASSIUM BLD-SCNC: 4.2 MMOL/L (ref 3.5–5.1)
POTASSIUM SERPL-SCNC: 4.6 MMOL/L (ref 3.5–5.1)
RBC # BLD AUTO: 3.14 M/UL (ref 4.2–5.9)
SAO2 % BLDA: 97 % (ref 93–100)
SODIUM SERPL-SCNC: 143 MMOL/L (ref 136–145)
WBC # BLD AUTO: 14.7 K/UL (ref 4–11)

## 2024-07-18 PROCEDURE — 2100000000 HC CCU R&B

## 2024-07-18 PROCEDURE — 80048 BASIC METABOLIC PNL TOTAL CA: CPT

## 2024-07-18 PROCEDURE — 2000000000 HC ICU R&B

## 2024-07-18 PROCEDURE — 84132 ASSAY OF SERUM POTASSIUM: CPT

## 2024-07-18 PROCEDURE — 97166 OT EVAL MOD COMPLEX 45 MIN: CPT

## 2024-07-18 PROCEDURE — 97530 THERAPEUTIC ACTIVITIES: CPT

## 2024-07-18 PROCEDURE — 94669 MECHANICAL CHEST WALL OSCILL: CPT

## 2024-07-18 PROCEDURE — 82947 ASSAY GLUCOSE BLOOD QUANT: CPT

## 2024-07-18 PROCEDURE — 93005 ELECTROCARDIOGRAM TRACING: CPT

## 2024-07-18 PROCEDURE — 85027 COMPLETE CBC AUTOMATED: CPT

## 2024-07-18 PROCEDURE — P9045 ALBUMIN (HUMAN), 5%, 250 ML: HCPCS

## 2024-07-18 PROCEDURE — P9045 ALBUMIN (HUMAN), 5%, 250 ML: HCPCS | Performed by: STUDENT IN AN ORGANIZED HEALTH CARE EDUCATION/TRAINING PROGRAM

## 2024-07-18 PROCEDURE — 71045 X-RAY EXAM CHEST 1 VIEW: CPT

## 2024-07-18 PROCEDURE — 2580000003 HC RX 258

## 2024-07-18 PROCEDURE — 85384 FIBRINOGEN ACTIVITY: CPT

## 2024-07-18 PROCEDURE — 6360000002 HC RX W HCPCS

## 2024-07-18 PROCEDURE — 93010 ELECTROCARDIOGRAM REPORT: CPT | Performed by: INTERNAL MEDICINE

## 2024-07-18 PROCEDURE — 99024 POSTOP FOLLOW-UP VISIT: CPT

## 2024-07-18 PROCEDURE — 82803 BLOOD GASES ANY COMBINATION: CPT

## 2024-07-18 PROCEDURE — 6360000002 HC RX W HCPCS: Performed by: STUDENT IN AN ORGANIZED HEALTH CARE EDUCATION/TRAINING PROGRAM

## 2024-07-18 PROCEDURE — 97162 PT EVAL MOD COMPLEX 30 MIN: CPT

## 2024-07-18 PROCEDURE — 83735 ASSAY OF MAGNESIUM: CPT

## 2024-07-18 PROCEDURE — 6370000000 HC RX 637 (ALT 250 FOR IP)

## 2024-07-18 RX ORDER — ALBUMIN, HUMAN INJ 5% 5 %
25 SOLUTION INTRAVENOUS ONCE
Status: COMPLETED | OUTPATIENT
Start: 2024-07-18 | End: 2024-07-18

## 2024-07-18 RX ORDER — FUROSEMIDE 10 MG/ML
10 INJECTION INTRAMUSCULAR; INTRAVENOUS ONCE
Status: COMPLETED | OUTPATIENT
Start: 2024-07-18 | End: 2024-07-18

## 2024-07-18 RX ORDER — MILRINONE LACTATE 0.2 MG/ML
.1-.75 INJECTION, SOLUTION INTRAVENOUS CONTINUOUS PRN
Status: DISCONTINUED | OUTPATIENT
Start: 2024-07-18 | End: 2024-07-19

## 2024-07-18 RX ORDER — SODIUM CHLORIDE 9 MG/ML
INJECTION, SOLUTION INTRAVENOUS
Status: COMPLETED
Start: 2024-07-18 | End: 2024-07-18

## 2024-07-18 RX ADMIN — POTASSIUM CHLORIDE 20 MEQ: 29.8 INJECTION, SOLUTION INTRAVENOUS at 02:16

## 2024-07-18 RX ADMIN — ALBUMIN (HUMAN) 25 G: 12.5 INJECTION, SOLUTION INTRAVENOUS at 10:26

## 2024-07-18 RX ADMIN — ATORVASTATIN CALCIUM 80 MG: 80 TABLET, FILM COATED ORAL at 19:55

## 2024-07-18 RX ADMIN — VANCOMYCIN HYDROCHLORIDE 1000 MG: 1 INJECTION, POWDER, LYOPHILIZED, FOR SOLUTION INTRAVENOUS at 06:56

## 2024-07-18 RX ADMIN — COLCHICINE 0.3 MG: 0.6 TABLET, FILM COATED ORAL at 09:48

## 2024-07-18 RX ADMIN — ALBUMIN (HUMAN) 12.5 G: 12.5 INJECTION, SOLUTION INTRAVENOUS at 01:00

## 2024-07-18 RX ADMIN — POLYETHYLENE GLYCOL 3350 17 G: 17 POWDER, FOR SOLUTION ORAL at 09:49

## 2024-07-18 RX ADMIN — SENNOSIDES AND DOCUSATE SODIUM 1 TABLET: 50; 8.6 TABLET ORAL at 09:48

## 2024-07-18 RX ADMIN — MILRINONE LACTATE 0.25 MCG/KG/MIN: 0.2 INJECTION, SOLUTION INTRAVENOUS at 03:46

## 2024-07-18 RX ADMIN — ALBUMIN (HUMAN) 12.5 G: 12.5 INJECTION, SOLUTION INTRAVENOUS at 07:30

## 2024-07-18 RX ADMIN — OXYCODONE 10 MG: 5 TABLET ORAL at 07:43

## 2024-07-18 RX ADMIN — OXYCODONE 10 MG: 5 TABLET ORAL at 18:12

## 2024-07-18 RX ADMIN — SODIUM CHLORIDE, PRESERVATIVE FREE 10 ML: 5 INJECTION INTRAVENOUS at 19:55

## 2024-07-18 RX ADMIN — OXYCODONE 5 MG: 5 TABLET ORAL at 13:46

## 2024-07-18 RX ADMIN — HYDROMORPHONE HYDROCHLORIDE 0.25 MG: 1 INJECTION, SOLUTION INTRAMUSCULAR; INTRAVENOUS; SUBCUTANEOUS at 20:45

## 2024-07-18 RX ADMIN — SODIUM CHLORIDE, PRESERVATIVE FREE 10 ML: 5 INJECTION INTRAVENOUS at 09:53

## 2024-07-18 RX ADMIN — SODIUM CHLORIDE 500 ML: 9 INJECTION, SOLUTION INTRAVENOUS at 03:42

## 2024-07-18 RX ADMIN — WATER 2000 MG: 1 INJECTION INTRAMUSCULAR; INTRAVENOUS; SUBCUTANEOUS at 01:21

## 2024-07-18 RX ADMIN — ALBUMIN (HUMAN) 12.5 G: 12.5 INJECTION, SOLUTION INTRAVENOUS at 15:42

## 2024-07-18 RX ADMIN — OXYCODONE 10 MG: 5 TABLET ORAL at 02:13

## 2024-07-18 RX ADMIN — FUROSEMIDE 10 MG: 10 INJECTION, SOLUTION INTRAMUSCULAR; INTRAVENOUS at 16:46

## 2024-07-18 RX ADMIN — PANTOPRAZOLE SODIUM 40 MG: 40 TABLET, DELAYED RELEASE ORAL at 09:48

## 2024-07-18 RX ADMIN — MUPIROCIN: 20 OINTMENT TOPICAL at 19:56

## 2024-07-18 RX ADMIN — VANCOMYCIN HYDROCHLORIDE 1000 MG: 1 INJECTION, POWDER, LYOPHILIZED, FOR SOLUTION INTRAVENOUS at 16:50

## 2024-07-18 RX ADMIN — COLCHICINE 0.3 MG: 0.6 TABLET, FILM COATED ORAL at 19:55

## 2024-07-18 RX ADMIN — ENOXAPARIN SODIUM 40 MG: 100 INJECTION SUBCUTANEOUS at 09:48

## 2024-07-18 RX ADMIN — ACETAMINOPHEN 1000 MG: 500 TABLET ORAL at 13:44

## 2024-07-18 RX ADMIN — ACETAMINOPHEN 1000 MG: 500 TABLET ORAL at 06:57

## 2024-07-18 RX ADMIN — ACETAMINOPHEN 1000 MG: 500 TABLET ORAL at 20:45

## 2024-07-18 RX ADMIN — SENNOSIDES AND DOCUSATE SODIUM 1 TABLET: 50; 8.6 TABLET ORAL at 19:55

## 2024-07-18 RX ADMIN — HYDROMORPHONE HYDROCHLORIDE 0.25 MG: 1 INJECTION, SOLUTION INTRAMUSCULAR; INTRAVENOUS; SUBCUTANEOUS at 00:32

## 2024-07-18 RX ADMIN — HYDROMORPHONE HYDROCHLORIDE 0.25 MG: 1 INJECTION, SOLUTION INTRAMUSCULAR; INTRAVENOUS; SUBCUTANEOUS at 04:03

## 2024-07-18 RX ADMIN — WATER 2000 MG: 1 INJECTION INTRAMUSCULAR; INTRAVENOUS; SUBCUTANEOUS at 16:38

## 2024-07-18 RX ADMIN — MUPIROCIN: 20 OINTMENT TOPICAL at 09:50

## 2024-07-18 RX ADMIN — WATER 2000 MG: 1 INJECTION INTRAMUSCULAR; INTRAVENOUS; SUBCUTANEOUS at 09:47

## 2024-07-18 RX ADMIN — ASPIRIN 81 MG: 81 TABLET, COATED ORAL at 09:48

## 2024-07-18 NOTE — ANESTHESIA POSTPROCEDURE EVALUATION
Department of Anesthesiology  Postprocedure Note    Patient: Landon Ordoñez  MRN: [id number]  YOB: 1975  Date of evaluation: 7/18/2024    Procedure Summary       Date: 07/17/24 Room / Location: [place] [id number] / [facility]    Anesthesia Start: 0728 Anesthesia Stop: 1722    Procedure: CABG X 4 (LIMA TO LAD, SVG TO OM2, SVG TO PDA, SVG TO DIAG), TCPB, AYSHA, DOPPLER FLOW VERIFICATION OF BYPASS GRAFTS WITH MEDISTIM PROBE 3.0, EVH OF RIGHT SAPHENOUS VEIN, INSERTION OF TEMPORARY ATRIAL AND VENTRICULAR PACING WIRES (Chest) Diagnosis:       Coronary artery disease with angina pectoris, unspecified vessel or lesion type, unspecified whether native or transplanted heart (HCC)      (Coronary artery disease with angina pectoris, unspecified vessel or lesion type, unspecified whether native or transplanted heart (HCC) [I25.119])    Surgeons: Carmela Conti MD Responsible Provider:     Anesthesia Type: general ASA Status: 4            Anesthesia Type: No value filed.    Vishnu Phase I: Vishnu Score: 10    Vishnu Phase II:      Anesthesia Post Evaluation    Patient location during evaluation: ICU  Patient participation: complete - patient participated  Level of consciousness: awake and alert  Pain score: 2  Airway patency: patent  Nausea & Vomiting: no nausea  Cardiovascular status: blood pressure returned to baseline  Respiratory status: nasal cannula  Hydration status: euvolemic  Comments: Extubated. On going ICU care on nasal cannula currently.   Multimodal analgesia pain management approach  Pain management: adequate        No notable events documented.

## 2024-07-18 NOTE — OP NOTE
Cardiothoracic Surgery Operative Report    Patient Name: Landon Ordoñez  Patient MRN: [id number]  Patient : 1975    Service date: 2024    Surgeon: Carmela Conti MD  Assistant(s): Wellington barajas, Jovanny Vega [facility]  Anesthesia Provider: VALENTINA Luque MD    Pre-operative diagnosis:   1. NSTEMI  2. Multivessel CAD involving LAD  3. Uncontrolled insulin-dependent T2DM (A1c 9.8)  4. HLD    Post-operative diagnosis: same    Procedure/description:  1. Median sternotomy.  2. Urgent Coronary artery bypass grafting x4 (left internal mammary artery to left anterior descending artery, reversed saphenous vein graft to diag; reversed saphenous vein graft to OM2; reversed saphenous vein graft to PDA) on cardiopulmonary bypass; CPT codes 54685 and 86193.  3. Endoscopic vein harvest from right lower extremity; CPT code 70227.  4. Intraoperative transesophageal echocardiography    Findings:  1. LIMA conduit of good quality, and good size.  2. Saphenous vein conduit of good quality, with few varicosities, and good size.  3. LAD heavily diseased, and 2 mm in size.  4. Diag of good quality, and 2.5 mm in size.   5. OM of diseased but good quality, and 2 mm in size.   6. PDA of good quality, and 2 mm in size.   7. AYSHA: LVEF 35% preoperatively and 35% postoperatively; Good RV function postoperatively; no valvular abnormalities pre and post-bypass    Perfusion Data:  1. Total cardiopulmonary bypass time: 208 minutes.   2. Total cross-clamp time: 179 minutes.   3. Lowest temperature achieved: 33.9 degrees Celsius.   4. Total heparin dose: 76,000 units.     Bypass Graft Flow Data:   1. LIMA-LAD graft with 35mL/minute flow with a PI of 2.9. There was high competitive flow between the LAD and Diag grafts.  2. SVG-Diag with 63mL/minute flow, PI of 1.5.   2. SVG-OM with 36mL/minute flow, PI of 2.7.   3. SVG-PDA with 29mL/minute flow, PI of 5.5.     Implants:  None    Implant Name Type Inv. Item Serial No.

## 2024-07-19 ENCOUNTER — APPOINTMENT (OUTPATIENT)
Dept: GENERAL RADIOLOGY | Age: 49
DRG: 231 | End: 2024-07-19
Attending: INTERNAL MEDICINE
Payer: COMMERCIAL

## 2024-07-19 LAB
ANION GAP SERPL CALCULATED.3IONS-SCNC: 12 MMOL/L (ref 3–16)
BUN SERPL-MCNC: 13 MG/DL (ref 7–20)
CALCIUM SERPL-MCNC: 7.8 MG/DL (ref 8.3–10.6)
CHLORIDE SERPL-SCNC: 106 MMOL/L (ref 99–110)
CO2 SERPL-SCNC: 22 MMOL/L (ref 21–32)
CREAT SERPL-MCNC: 0.7 MG/DL (ref 0.9–1.3)
DEPRECATED RDW RBC AUTO: 15 % (ref 12.4–15.4)
FIBRINOGEN PPP-MCNC: 631 MG/DL (ref 227–534)
GFR SERPLBLD CREATININE-BSD FMLA CKD-EPI: >90 ML/MIN/{1.73_M2}
GLUCOSE BLD-MCNC: 100 MG/DL (ref 70–99)
GLUCOSE BLD-MCNC: 137 MG/DL (ref 70–99)
GLUCOSE BLD-MCNC: 139 MG/DL (ref 70–99)
GLUCOSE BLD-MCNC: 165 MG/DL (ref 70–99)
GLUCOSE BLD-MCNC: 225 MG/DL (ref 70–99)
GLUCOSE BLD-MCNC: 229 MG/DL (ref 70–99)
GLUCOSE BLD-MCNC: 238 MG/DL (ref 70–99)
GLUCOSE BLD-MCNC: 80 MG/DL (ref 70–99)
GLUCOSE BLD-MCNC: 95 MG/DL (ref 70–99)
GLUCOSE BLD-MCNC: 99 MG/DL (ref 70–99)
GLUCOSE SERPL-MCNC: 102 MG/DL (ref 70–99)
HCT VFR BLD AUTO: 24.2 % (ref 40.5–52.5)
HGB BLD-MCNC: 7.8 G/DL (ref 13.5–17.5)
MAGNESIUM SERPL-MCNC: 2.4 MG/DL (ref 1.8–2.4)
MCH RBC QN AUTO: 28.6 PG (ref 26–34)
MCHC RBC AUTO-ENTMCNC: 32.3 G/DL (ref 31–36)
MCV RBC AUTO: 88.6 FL (ref 80–100)
MRSA SPEC QL CULT: NORMAL
PERFORMED ON: ABNORMAL
PERFORMED ON: NORMAL
PLATELET # BLD AUTO: 97 K/UL (ref 135–450)
PLATELET BLD QL SMEAR: ABNORMAL
PMV BLD AUTO: 9.5 FL (ref 5–10.5)
POTASSIUM SERPL-SCNC: 4.2 MMOL/L (ref 3.5–5.1)
RBC # BLD AUTO: 2.73 M/UL (ref 4.2–5.9)
SLIDE REVIEW: ABNORMAL
SODIUM SERPL-SCNC: 140 MMOL/L (ref 136–145)
WBC # BLD AUTO: 14.8 K/UL (ref 4–11)

## 2024-07-19 PROCEDURE — 94761 N-INVAS EAR/PLS OXIMETRY MLT: CPT

## 2024-07-19 PROCEDURE — 94669 MECHANICAL CHEST WALL OSCILL: CPT

## 2024-07-19 PROCEDURE — 6370000000 HC RX 637 (ALT 250 FOR IP): Performed by: STUDENT IN AN ORGANIZED HEALTH CARE EDUCATION/TRAINING PROGRAM

## 2024-07-19 PROCEDURE — 94667 MNPJ CHEST WALL 1ST: CPT

## 2024-07-19 PROCEDURE — 6370000000 HC RX 637 (ALT 250 FOR IP)

## 2024-07-19 PROCEDURE — 85027 COMPLETE CBC AUTOMATED: CPT

## 2024-07-19 PROCEDURE — 2700000000 HC OXYGEN THERAPY PER DAY

## 2024-07-19 PROCEDURE — 2580000003 HC RX 258

## 2024-07-19 PROCEDURE — 71045 X-RAY EXAM CHEST 1 VIEW: CPT

## 2024-07-19 PROCEDURE — 83735 ASSAY OF MAGNESIUM: CPT

## 2024-07-19 PROCEDURE — 2580000003 HC RX 258: Performed by: INTERNAL MEDICINE

## 2024-07-19 PROCEDURE — 6360000002 HC RX W HCPCS: Performed by: STUDENT IN AN ORGANIZED HEALTH CARE EDUCATION/TRAINING PROGRAM

## 2024-07-19 PROCEDURE — 6360000002 HC RX W HCPCS

## 2024-07-19 PROCEDURE — 97116 GAIT TRAINING THERAPY: CPT

## 2024-07-19 PROCEDURE — 97530 THERAPEUTIC ACTIVITIES: CPT

## 2024-07-19 PROCEDURE — 6360000002 HC RX W HCPCS: Performed by: INTERNAL MEDICINE

## 2024-07-19 PROCEDURE — 6370000000 HC RX 637 (ALT 250 FOR IP): Performed by: INTERNAL MEDICINE

## 2024-07-19 PROCEDURE — 5A0955A ASSISTANCE WITH RESPIRATORY VENTILATION, GREATER THAN 96 CONSECUTIVE HOURS, HIGH NASAL FLOW/VELOCITY: ICD-10-PCS | Performed by: STUDENT IN AN ORGANIZED HEALTH CARE EDUCATION/TRAINING PROGRAM

## 2024-07-19 PROCEDURE — 2000000000 HC ICU R&B

## 2024-07-19 PROCEDURE — 99024 POSTOP FOLLOW-UP VISIT: CPT

## 2024-07-19 PROCEDURE — 85384 FIBRINOGEN ACTIVITY: CPT

## 2024-07-19 PROCEDURE — 80048 BASIC METABOLIC PNL TOTAL CA: CPT

## 2024-07-19 RX ORDER — INSULIN LISPRO 100 [IU]/ML
0-18 INJECTION, SOLUTION INTRAVENOUS; SUBCUTANEOUS
Status: DISCONTINUED | OUTPATIENT
Start: 2024-07-19 | End: 2024-07-19

## 2024-07-19 RX ORDER — INSULIN LISPRO 100 [IU]/ML
0-6 INJECTION, SOLUTION INTRAVENOUS; SUBCUTANEOUS NIGHTLY
Status: DISCONTINUED | OUTPATIENT
Start: 2024-07-19 | End: 2024-07-22

## 2024-07-19 RX ORDER — GLUCAGON 1 MG/ML
1 KIT INJECTION PRN
Status: DISCONTINUED | OUTPATIENT
Start: 2024-07-19 | End: 2024-07-19 | Stop reason: SDUPTHER

## 2024-07-19 RX ORDER — INSULIN GLARGINE 100 [IU]/ML
10 INJECTION, SOLUTION SUBCUTANEOUS 2 TIMES DAILY
Status: DISCONTINUED | OUTPATIENT
Start: 2024-07-19 | End: 2024-07-20

## 2024-07-19 RX ORDER — DEXTROSE MONOHYDRATE 100 MG/ML
INJECTION, SOLUTION INTRAVENOUS CONTINUOUS PRN
Status: DISCONTINUED | OUTPATIENT
Start: 2024-07-19 | End: 2024-07-19 | Stop reason: SDUPTHER

## 2024-07-19 RX ORDER — DEXTROSE MONOHYDRATE 100 MG/ML
INJECTION, SOLUTION INTRAVENOUS CONTINUOUS PRN
Status: DISCONTINUED | OUTPATIENT
Start: 2024-07-19 | End: 2024-07-19

## 2024-07-19 RX ORDER — GLUCAGON 1 MG/ML
1 KIT INJECTION PRN
Status: DISCONTINUED | OUTPATIENT
Start: 2024-07-19 | End: 2024-07-19 | Stop reason: RX

## 2024-07-19 RX ORDER — INSULIN LISPRO 100 [IU]/ML
0-18 INJECTION, SOLUTION INTRAVENOUS; SUBCUTANEOUS ONCE
Status: COMPLETED | OUTPATIENT
Start: 2024-07-19 | End: 2024-07-19

## 2024-07-19 RX ORDER — INSULIN LISPRO 100 [IU]/ML
0-9 INJECTION, SOLUTION INTRAVENOUS; SUBCUTANEOUS NIGHTLY
Status: DISCONTINUED | OUTPATIENT
Start: 2024-07-19 | End: 2024-07-19

## 2024-07-19 RX ORDER — POTASSIUM CHLORIDE 20 MEQ/1
20 TABLET, EXTENDED RELEASE ORAL EVERY 8 HOURS
Status: DISCONTINUED | OUTPATIENT
Start: 2024-07-19 | End: 2024-07-20

## 2024-07-19 RX ORDER — FUROSEMIDE 10 MG/ML
20 INJECTION INTRAMUSCULAR; INTRAVENOUS EVERY 8 HOURS
Status: DISCONTINUED | OUTPATIENT
Start: 2024-07-19 | End: 2024-07-20

## 2024-07-19 RX ORDER — INSULIN LISPRO 100 [IU]/ML
0.08 INJECTION, SOLUTION INTRAVENOUS; SUBCUTANEOUS
Status: DISCONTINUED | OUTPATIENT
Start: 2024-07-19 | End: 2024-07-21

## 2024-07-19 RX ORDER — INSULIN LISPRO 100 [IU]/ML
0-12 INJECTION, SOLUTION INTRAVENOUS; SUBCUTANEOUS
Status: DISCONTINUED | OUTPATIENT
Start: 2024-07-19 | End: 2024-07-22

## 2024-07-19 RX ORDER — INSULIN GLARGINE 100 [IU]/ML
10 INJECTION, SOLUTION SUBCUTANEOUS DAILY
Status: DISCONTINUED | OUTPATIENT
Start: 2024-07-19 | End: 2024-07-19

## 2024-07-19 RX ADMIN — ASPIRIN 81 MG: 81 TABLET, COATED ORAL at 09:03

## 2024-07-19 RX ADMIN — TAMSULOSIN HYDROCHLORIDE 0.4 MG: 0.4 CAPSULE ORAL at 09:03

## 2024-07-19 RX ADMIN — FUROSEMIDE 20 MG: 10 INJECTION, SOLUTION INTRAMUSCULAR; INTRAVENOUS at 16:49

## 2024-07-19 RX ADMIN — INSULIN LISPRO 7 UNITS: 100 INJECTION, SOLUTION INTRAVENOUS; SUBCUTANEOUS at 16:50

## 2024-07-19 RX ADMIN — SODIUM CHLORIDE 100 MG: 9 INJECTION, SOLUTION INTRAVENOUS at 09:19

## 2024-07-19 RX ADMIN — SODIUM CHLORIDE, PRESERVATIVE FREE 10 ML: 5 INJECTION INTRAVENOUS at 20:09

## 2024-07-19 RX ADMIN — SENNOSIDES AND DOCUSATE SODIUM 1 TABLET: 50; 8.6 TABLET ORAL at 20:07

## 2024-07-19 RX ADMIN — HYDROMORPHONE HYDROCHLORIDE 0.25 MG: 1 INJECTION, SOLUTION INTRAMUSCULAR; INTRAVENOUS; SUBCUTANEOUS at 03:51

## 2024-07-19 RX ADMIN — ENOXAPARIN SODIUM 40 MG: 100 INJECTION SUBCUTANEOUS at 09:03

## 2024-07-19 RX ADMIN — MUPIROCIN: 20 OINTMENT TOPICAL at 20:09

## 2024-07-19 RX ADMIN — ONDANSETRON 4 MG: 2 INJECTION INTRAMUSCULAR; INTRAVENOUS at 10:54

## 2024-07-19 RX ADMIN — OXYCODONE 5 MG: 5 TABLET ORAL at 20:18

## 2024-07-19 RX ADMIN — PANTOPRAZOLE SODIUM 40 MG: 40 TABLET, DELAYED RELEASE ORAL at 09:03

## 2024-07-19 RX ADMIN — WATER 2000 MG: 1 INJECTION INTRAMUSCULAR; INTRAVENOUS; SUBCUTANEOUS at 01:41

## 2024-07-19 RX ADMIN — COLCHICINE 0.3 MG: 0.6 TABLET, FILM COATED ORAL at 20:07

## 2024-07-19 RX ADMIN — INSULIN LISPRO 6 UNITS: 100 INJECTION, SOLUTION INTRAVENOUS; SUBCUTANEOUS at 12:10

## 2024-07-19 RX ADMIN — OXYCODONE 10 MG: 5 TABLET ORAL at 10:54

## 2024-07-19 RX ADMIN — INSULIN GLARGINE 10 UNITS: 100 INJECTION, SOLUTION SUBCUTANEOUS at 12:10

## 2024-07-19 RX ADMIN — OXYCODONE 10 MG: 5 TABLET ORAL at 06:18

## 2024-07-19 RX ADMIN — FUROSEMIDE 20 MG: 10 INJECTION, SOLUTION INTRAMUSCULAR; INTRAVENOUS at 09:08

## 2024-07-19 RX ADMIN — ACETAMINOPHEN 1000 MG: 500 TABLET ORAL at 14:23

## 2024-07-19 RX ADMIN — POTASSIUM CHLORIDE 20 MEQ: 1500 TABLET, EXTENDED RELEASE ORAL at 16:49

## 2024-07-19 RX ADMIN — ATORVASTATIN CALCIUM 80 MG: 80 TABLET, FILM COATED ORAL at 20:07

## 2024-07-19 RX ADMIN — ACETAMINOPHEN 1000 MG: 500 TABLET ORAL at 05:00

## 2024-07-19 RX ADMIN — INSULIN GLARGINE 10 UNITS: 100 INJECTION, SOLUTION SUBCUTANEOUS at 20:08

## 2024-07-19 RX ADMIN — SODIUM CHLORIDE, PRESERVATIVE FREE 10 ML: 5 INJECTION INTRAVENOUS at 09:04

## 2024-07-19 RX ADMIN — INSULIN LISPRO 4 UNITS: 100 INJECTION, SOLUTION INTRAVENOUS; SUBCUTANEOUS at 16:50

## 2024-07-19 RX ADMIN — MUPIROCIN: 20 OINTMENT TOPICAL at 09:04

## 2024-07-19 RX ADMIN — INSULIN LISPRO 3 UNITS: 100 INJECTION, SOLUTION INTRAVENOUS; SUBCUTANEOUS at 09:12

## 2024-07-19 RX ADMIN — POLYETHYLENE GLYCOL 3350 17 G: 17 POWDER, FOR SOLUTION ORAL at 09:03

## 2024-07-19 RX ADMIN — INSULIN LISPRO 2 UNITS: 100 INJECTION, SOLUTION INTRAVENOUS; SUBCUTANEOUS at 20:08

## 2024-07-19 RX ADMIN — COLCHICINE 0.3 MG: 0.6 TABLET, FILM COATED ORAL at 09:03

## 2024-07-19 RX ADMIN — POTASSIUM CHLORIDE 20 MEQ: 1500 TABLET, EXTENDED RELEASE ORAL at 09:08

## 2024-07-19 RX ADMIN — SENNOSIDES AND DOCUSATE SODIUM 1 TABLET: 50; 8.6 TABLET ORAL at 09:03

## 2024-07-19 NOTE — FLOWSHEET NOTE
07/19/24 1706   Oxygen Therapy   SpO2 91 %   O2 Device High flow nasal cannula   O2 Flow Rate (L/min) 15 L/min

## 2024-07-19 NOTE — PLAN OF CARE
Problem: Safety - Adult  Goal: Free from fall injury  Outcome: Progressing     Problem: Pain  Goal: Verbalizes/displays adequate comfort level or baseline comfort level  Outcome: Progressing     Problem: ABCDS Injury Assessment  Goal: Absence of physical injury  Outcome: Progressing     Problem: Discharge Planning  Goal: Discharge to home or other facility with appropriate resources  Outcome: Progressing     Problem: Nutrition Deficit:  Goal: Optimize nutritional status  Outcome: Progressing

## 2024-07-20 ENCOUNTER — APPOINTMENT (OUTPATIENT)
Dept: GENERAL RADIOLOGY | Age: 49
DRG: 231 | End: 2024-07-20
Attending: INTERNAL MEDICINE
Payer: COMMERCIAL

## 2024-07-20 LAB
ALBUMIN SERPL-MCNC: 3.3 G/DL (ref 3.4–5)
ALP SERPL-CCNC: 138 U/L (ref 40–129)
ALT SERPL-CCNC: 40 U/L (ref 10–40)
ANION GAP SERPL CALCULATED.3IONS-SCNC: 12 MMOL/L (ref 3–16)
ANION GAP SERPL CALCULATED.3IONS-SCNC: 13 MMOL/L (ref 3–16)
ANION GAP SERPL CALCULATED.3IONS-SCNC: 9 MMOL/L (ref 3–16)
AST SERPL-CCNC: 49 U/L (ref 15–37)
BILIRUB DIRECT SERPL-MCNC: <0.2 MG/DL (ref 0–0.3)
BILIRUB INDIRECT SERPL-MCNC: ABNORMAL MG/DL (ref 0–1)
BILIRUB SERPL-MCNC: 0.7 MG/DL (ref 0–1)
BLOOD BANK DISPENSE STATUS: NORMAL
BLOOD BANK DISPENSE STATUS: NORMAL
BLOOD BANK PRODUCT CODE: NORMAL
BLOOD BANK PRODUCT CODE: NORMAL
BPU ID: NORMAL
BPU ID: NORMAL
BUN SERPL-MCNC: 18 MG/DL (ref 7–20)
BUN SERPL-MCNC: 19 MG/DL (ref 7–20)
BUN SERPL-MCNC: 19 MG/DL (ref 7–20)
CA-I BLD-SCNC: 1.03 MMOL/L (ref 1.12–1.32)
CALCIUM SERPL-MCNC: 7.7 MG/DL (ref 8.3–10.6)
CALCIUM SERPL-MCNC: 8.2 MG/DL (ref 8.3–10.6)
CALCIUM SERPL-MCNC: 8.5 MG/DL (ref 8.3–10.6)
CHLORIDE SERPL-SCNC: 101 MMOL/L (ref 99–110)
CHLORIDE SERPL-SCNC: 101 MMOL/L (ref 99–110)
CHLORIDE SERPL-SCNC: 102 MMOL/L (ref 99–110)
CO2 SERPL-SCNC: 24 MMOL/L (ref 21–32)
CO2 SERPL-SCNC: 26 MMOL/L (ref 21–32)
CO2 SERPL-SCNC: 27 MMOL/L (ref 21–32)
CREAT SERPL-MCNC: 0.7 MG/DL (ref 0.9–1.3)
CREAT SERPL-MCNC: 0.8 MG/DL (ref 0.9–1.3)
CREAT SERPL-MCNC: 0.9 MG/DL (ref 0.9–1.3)
DEPRECATED RDW RBC AUTO: 14.8 % (ref 12.4–15.4)
DESCRIPTION BLOOD BANK: NORMAL
DESCRIPTION BLOOD BANK: NORMAL
GFR SERPLBLD CREATININE-BSD FMLA CKD-EPI: >90 ML/MIN/{1.73_M2}
GLUCOSE BLD-MCNC: 108 MG/DL (ref 70–99)
GLUCOSE BLD-MCNC: 114 MG/DL (ref 70–99)
GLUCOSE BLD-MCNC: 201 MG/DL (ref 70–99)
GLUCOSE BLD-MCNC: 234 MG/DL (ref 70–99)
GLUCOSE SERPL-MCNC: 120 MG/DL (ref 70–99)
GLUCOSE SERPL-MCNC: 229 MG/DL (ref 70–99)
GLUCOSE SERPL-MCNC: 230 MG/DL (ref 70–99)
HCT VFR BLD AUTO: 21.6 % (ref 40.5–52.5)
HCT VFR BLD AUTO: 21.8 % (ref 40.5–52.5)
HGB BLD-MCNC: 7.2 G/DL (ref 13.5–17.5)
HGB BLD-MCNC: 7.4 G/DL (ref 13.5–17.5)
MAGNESIUM SERPL-MCNC: 2.1 MG/DL (ref 1.8–2.4)
MCH RBC QN AUTO: 29.2 PG (ref 26–34)
MCHC RBC AUTO-ENTMCNC: 33.3 G/DL (ref 31–36)
MCV RBC AUTO: 87.7 FL (ref 80–100)
PERFORMED ON: ABNORMAL
PH BLDV: 7.47 [PH] (ref 7.35–7.45)
PLATELET # BLD AUTO: 126 K/UL (ref 135–450)
PMV BLD AUTO: 8.4 FL (ref 5–10.5)
POTASSIUM SERPL-SCNC: 3.8 MMOL/L (ref 3.5–5.1)
POTASSIUM SERPL-SCNC: 4 MMOL/L (ref 3.5–5.1)
POTASSIUM SERPL-SCNC: 4.1 MMOL/L (ref 3.5–5.1)
PROT SERPL-MCNC: 5.8 G/DL (ref 6.4–8.2)
RBC # BLD AUTO: 2.46 M/UL (ref 4.2–5.9)
SODIUM SERPL-SCNC: 137 MMOL/L (ref 136–145)
SODIUM SERPL-SCNC: 138 MMOL/L (ref 136–145)
SODIUM SERPL-SCNC: 140 MMOL/L (ref 136–145)
WBC # BLD AUTO: 12.3 K/UL (ref 4–11)

## 2024-07-20 PROCEDURE — 6360000002 HC RX W HCPCS: Performed by: INTERNAL MEDICINE

## 2024-07-20 PROCEDURE — 85027 COMPLETE CBC AUTOMATED: CPT

## 2024-07-20 PROCEDURE — 94669 MECHANICAL CHEST WALL OSCILL: CPT

## 2024-07-20 PROCEDURE — 85014 HEMATOCRIT: CPT

## 2024-07-20 PROCEDURE — 71045 X-RAY EXAM CHEST 1 VIEW: CPT

## 2024-07-20 PROCEDURE — 6370000000 HC RX 637 (ALT 250 FOR IP): Performed by: STUDENT IN AN ORGANIZED HEALTH CARE EDUCATION/TRAINING PROGRAM

## 2024-07-20 PROCEDURE — P9047 ALBUMIN (HUMAN), 25%, 50ML: HCPCS | Performed by: STUDENT IN AN ORGANIZED HEALTH CARE EDUCATION/TRAINING PROGRAM

## 2024-07-20 PROCEDURE — 2700000000 HC OXYGEN THERAPY PER DAY

## 2024-07-20 PROCEDURE — 94668 MNPJ CHEST WALL SBSQ: CPT

## 2024-07-20 PROCEDURE — 80076 HEPATIC FUNCTION PANEL: CPT

## 2024-07-20 PROCEDURE — 2580000003 HC RX 258

## 2024-07-20 PROCEDURE — 85018 HEMOGLOBIN: CPT

## 2024-07-20 PROCEDURE — 99024 POSTOP FOLLOW-UP VISIT: CPT | Performed by: NURSE PRACTITIONER

## 2024-07-20 PROCEDURE — 6370000000 HC RX 637 (ALT 250 FOR IP)

## 2024-07-20 PROCEDURE — 2580000003 HC RX 258: Performed by: INTERNAL MEDICINE

## 2024-07-20 PROCEDURE — 83735 ASSAY OF MAGNESIUM: CPT

## 2024-07-20 PROCEDURE — 82330 ASSAY OF CALCIUM: CPT

## 2024-07-20 PROCEDURE — 6360000002 HC RX W HCPCS

## 2024-07-20 PROCEDURE — 6370000000 HC RX 637 (ALT 250 FOR IP): Performed by: INTERNAL MEDICINE

## 2024-07-20 PROCEDURE — 94761 N-INVAS EAR/PLS OXIMETRY MLT: CPT

## 2024-07-20 PROCEDURE — 6360000002 HC RX W HCPCS: Performed by: STUDENT IN AN ORGANIZED HEALTH CARE EDUCATION/TRAINING PROGRAM

## 2024-07-20 PROCEDURE — 2580000003 HC RX 258: Performed by: STUDENT IN AN ORGANIZED HEALTH CARE EDUCATION/TRAINING PROGRAM

## 2024-07-20 PROCEDURE — 80048 BASIC METABOLIC PNL TOTAL CA: CPT

## 2024-07-20 PROCEDURE — 2000000000 HC ICU R&B

## 2024-07-20 RX ORDER — ALBUMIN (HUMAN) 12.5 G/50ML
12.5 SOLUTION INTRAVENOUS EVERY 8 HOURS
Status: DISCONTINUED | OUTPATIENT
Start: 2024-07-20 | End: 2024-07-21

## 2024-07-20 RX ORDER — FUROSEMIDE 10 MG/ML
20 INJECTION INTRAMUSCULAR; INTRAVENOUS EVERY 6 HOURS
Status: DISCONTINUED | OUTPATIENT
Start: 2024-07-20 | End: 2024-07-20

## 2024-07-20 RX ORDER — INSULIN GLARGINE 100 [IU]/ML
15 INJECTION, SOLUTION SUBCUTANEOUS 2 TIMES DAILY
Status: DISCONTINUED | OUTPATIENT
Start: 2024-07-20 | End: 2024-07-21

## 2024-07-20 RX ORDER — POTASSIUM CHLORIDE 20 MEQ/1
20 TABLET, EXTENDED RELEASE ORAL EVERY 6 HOURS
Status: DISCONTINUED | OUTPATIENT
Start: 2024-07-20 | End: 2024-07-22

## 2024-07-20 RX ADMIN — INSULIN LISPRO 4 UNITS: 100 INJECTION, SOLUTION INTRAVENOUS; SUBCUTANEOUS at 08:38

## 2024-07-20 RX ADMIN — ACETAMINOPHEN 1000 MG: 500 TABLET ORAL at 08:33

## 2024-07-20 RX ADMIN — SODIUM CHLORIDE, PRESERVATIVE FREE 10 ML: 5 INJECTION INTRAVENOUS at 20:10

## 2024-07-20 RX ADMIN — MUPIROCIN: 20 OINTMENT TOPICAL at 08:37

## 2024-07-20 RX ADMIN — SENNOSIDES AND DOCUSATE SODIUM 1 TABLET: 50; 8.6 TABLET ORAL at 20:05

## 2024-07-20 RX ADMIN — INSULIN LISPRO 7 UNITS: 100 INJECTION, SOLUTION INTRAVENOUS; SUBCUTANEOUS at 16:43

## 2024-07-20 RX ADMIN — POLYETHYLENE GLYCOL 3350 17 G: 17 POWDER, FOR SOLUTION ORAL at 08:30

## 2024-07-20 RX ADMIN — POTASSIUM CHLORIDE 20 MEQ: 1500 TABLET, EXTENDED RELEASE ORAL at 13:34

## 2024-07-20 RX ADMIN — INSULIN LISPRO 7 UNITS: 100 INJECTION, SOLUTION INTRAVENOUS; SUBCUTANEOUS at 08:30

## 2024-07-20 RX ADMIN — ACETAMINOPHEN 1000 MG: 500 TABLET ORAL at 13:34

## 2024-07-20 RX ADMIN — POTASSIUM CHLORIDE 20 MEQ: 1500 TABLET, EXTENDED RELEASE ORAL at 20:10

## 2024-07-20 RX ADMIN — METOPROLOL TARTRATE 12.5 MG: 25 TABLET, FILM COATED ORAL at 08:28

## 2024-07-20 RX ADMIN — ALBUMIN (HUMAN) 12.5 G: 0.25 INJECTION, SOLUTION INTRAVENOUS at 16:02

## 2024-07-20 RX ADMIN — INSULIN LISPRO 4 UNITS: 100 INJECTION, SOLUTION INTRAVENOUS; SUBCUTANEOUS at 11:55

## 2024-07-20 RX ADMIN — ENOXAPARIN SODIUM 40 MG: 100 INJECTION SUBCUTANEOUS at 08:27

## 2024-07-20 RX ADMIN — COLCHICINE 0.3 MG: 0.6 TABLET, FILM COATED ORAL at 08:27

## 2024-07-20 RX ADMIN — INSULIN GLARGINE 15 UNITS: 100 INJECTION, SOLUTION SUBCUTANEOUS at 08:30

## 2024-07-20 RX ADMIN — ALBUMIN (HUMAN) 12.5 G: 0.25 INJECTION, SOLUTION INTRAVENOUS at 08:47

## 2024-07-20 RX ADMIN — FUROSEMIDE 5 MG/HR: 10 INJECTION, SOLUTION INTRAMUSCULAR; INTRAVENOUS at 10:11

## 2024-07-20 RX ADMIN — OXYCODONE 5 MG: 5 TABLET ORAL at 05:45

## 2024-07-20 RX ADMIN — FUROSEMIDE 20 MG: 10 INJECTION, SOLUTION INTRAMUSCULAR; INTRAVENOUS at 01:27

## 2024-07-20 RX ADMIN — INSULIN GLARGINE 15 UNITS: 100 INJECTION, SOLUTION SUBCUTANEOUS at 20:06

## 2024-07-20 RX ADMIN — FUROSEMIDE 20 MG: 10 INJECTION, SOLUTION INTRAMUSCULAR; INTRAVENOUS at 08:25

## 2024-07-20 RX ADMIN — SODIUM CHLORIDE 100 MG: 9 INJECTION, SOLUTION INTRAVENOUS at 08:49

## 2024-07-20 RX ADMIN — SENNOSIDES AND DOCUSATE SODIUM 1 TABLET: 50; 8.6 TABLET ORAL at 08:29

## 2024-07-20 RX ADMIN — ATORVASTATIN CALCIUM 80 MG: 80 TABLET, FILM COATED ORAL at 20:05

## 2024-07-20 RX ADMIN — POTASSIUM CHLORIDE 40 MEQ: 1500 TABLET, EXTENDED RELEASE ORAL at 20:06

## 2024-07-20 RX ADMIN — PANTOPRAZOLE SODIUM 40 MG: 40 TABLET, DELAYED RELEASE ORAL at 08:30

## 2024-07-20 RX ADMIN — INSULIN LISPRO 7 UNITS: 100 INJECTION, SOLUTION INTRAVENOUS; SUBCUTANEOUS at 16:04

## 2024-07-20 RX ADMIN — COLCHICINE 0.3 MG: 0.6 TABLET, FILM COATED ORAL at 20:06

## 2024-07-20 RX ADMIN — METOPROLOL TARTRATE 12.5 MG: 25 TABLET, FILM COATED ORAL at 16:13

## 2024-07-20 RX ADMIN — TAMSULOSIN HYDROCHLORIDE 0.4 MG: 0.4 CAPSULE ORAL at 08:27

## 2024-07-20 RX ADMIN — POTASSIUM CHLORIDE 20 MEQ: 1500 TABLET, EXTENDED RELEASE ORAL at 08:29

## 2024-07-20 RX ADMIN — ASPIRIN 81 MG: 81 TABLET, COATED ORAL at 08:29

## 2024-07-20 RX ADMIN — INSULIN LISPRO 7 UNITS: 100 INJECTION, SOLUTION INTRAVENOUS; SUBCUTANEOUS at 11:55

## 2024-07-20 RX ADMIN — POTASSIUM CHLORIDE 20 MEQ: 1500 TABLET, EXTENDED RELEASE ORAL at 01:26

## 2024-07-20 RX ADMIN — SODIUM CHLORIDE, PRESERVATIVE FREE 10 ML: 5 INJECTION INTRAVENOUS at 08:31

## 2024-07-20 RX ADMIN — ACETAMINOPHEN 1000 MG: 500 TABLET ORAL at 00:08

## 2024-07-20 NOTE — FLOWSHEET NOTE
07/20/24 0900   Vitals   Temp 98.4 °F (36.9 °C)   Temp Source Temporal   Pulse (!) 103   Heart Rate Source Monitor   /77   MAP (Calculated) 97   MAP (mmHg) 95   BP Location Left upper arm   BP Upper/Lower Upper   BP Method Automatic   Patient Position Up in chair   Cardiac Rhythm Sinus tachy   Pain Assessment   Pain Assessment None - Denies Pain   Pain Level 3   Patient's Stated Pain Goal 0 - No pain   Pain Location Chest;Shoulder   Pain Orientation Left;Mid   Pain Descriptors Aching   Functional Pain Assessment Activities are not prevented   Pain Type Acute pain;Surgical pain   Non-Pharmaceutical Pain Intervention(s) Repositioned   Response to Pain Intervention Pain improved but above pain goal   Oxygen Therapy   SpO2 94 %   Pulse Oximetry Type Continuous   Pulse Oximeter Device Mode Continuous   Pulse Oximeter Device Location Finger   O2 Device High flow nasal cannula   Oximetry Probe Site Changed Yes   Skin Assessment Clean, dry, & intact   O2 Flow Rate (L/min) 14 L/min     Shift assessment complete - see complex assessment data in flowsheet. Pt alert and oriented x4. Sinus tachy, other VSS. POC discussed with pt, no further questions or concerns at this time. Lasix gtt started @ 5mg/hr per . Bed in lowest position, call light within reach.

## 2024-07-20 NOTE — PLAN OF CARE
Problem: Safety - Adult  Goal: Free from fall injury  Outcome: Progressing     Problem: Pain  Goal: Verbalizes/displays adequate comfort level or baseline comfort level  Outcome: Progressing     Problem: ABCDS Injury Assessment  Goal: Absence of physical injury  Outcome: Progressing     Problem: Discharge Planning  Goal: Discharge to home or other facility with appropriate resources  Outcome: Progressing  Flowsheets (Taken 7/19/2024 2000)  Discharge to home or other facility with appropriate resources:   Identify barriers to discharge with patient and caregiver   Arrange for needed discharge resources and transportation as appropriate   Identify discharge learning needs (meds, wound care, etc)   Arrange for interpreters to assist at discharge as needed   Refer to discharge planning if patient needs post-hospital services based on physician order or complex needs related to functional status, cognitive ability or social support system     Problem: Nutrition Deficit:  Goal: Optimize nutritional status  Outcome: Progressing

## 2024-07-20 NOTE — FLOWSHEET NOTE
07/20/24 0640   Oxygen Therapy   SpO2 92 %   Pulse Oximetry Type Continuous   Pulse Oximeter Device Location Finger   O2 Device High flow nasal cannula   Oximetry Probe Site Changed Yes   Skin Assessment Clean, dry, & intact   O2 Flow Rate (L/min) 8 L/min

## 2024-07-21 ENCOUNTER — APPOINTMENT (OUTPATIENT)
Dept: GENERAL RADIOLOGY | Age: 49
DRG: 231 | End: 2024-07-21
Attending: INTERNAL MEDICINE
Payer: COMMERCIAL

## 2024-07-21 LAB
ABO + RH BLD: NORMAL
ANION GAP SERPL CALCULATED.3IONS-SCNC: 12 MMOL/L (ref 3–16)
ANION GAP SERPL CALCULATED.3IONS-SCNC: 13 MMOL/L (ref 3–16)
ANION GAP SERPL CALCULATED.3IONS-SCNC: 15 MMOL/L (ref 3–16)
BASE EXCESS BLDA CALC-SCNC: 5 MMOL/L (ref -3–3)
BLD GP AB SCN SERPL QL: NORMAL
BLOOD BANK DISPENSE STATUS: NORMAL
BLOOD BANK DISPENSE STATUS: NORMAL
BLOOD BANK PRODUCT CODE: NORMAL
BLOOD BANK PRODUCT CODE: NORMAL
BPU ID: NORMAL
BPU ID: NORMAL
BUN SERPL-MCNC: 16 MG/DL (ref 7–20)
BUN SERPL-MCNC: 16 MG/DL (ref 7–20)
BUN SERPL-MCNC: 17 MG/DL (ref 7–20)
BUN SERPL-MCNC: 17 MG/DL (ref 7–20)
BUN SERPL-MCNC: 18 MG/DL (ref 7–20)
CALCIUM SERPL-MCNC: 8.3 MG/DL (ref 8.3–10.6)
CALCIUM SERPL-MCNC: 8.5 MG/DL (ref 8.3–10.6)
CALCIUM SERPL-MCNC: 8.5 MG/DL (ref 8.3–10.6)
CALCIUM SERPL-MCNC: 8.6 MG/DL (ref 8.3–10.6)
CALCIUM SERPL-MCNC: 8.9 MG/DL (ref 8.3–10.6)
CHLORIDE SERPL-SCNC: 100 MMOL/L (ref 99–110)
CHLORIDE SERPL-SCNC: 101 MMOL/L (ref 99–110)
CHLORIDE SERPL-SCNC: 97 MMOL/L (ref 99–110)
CHLORIDE SERPL-SCNC: 98 MMOL/L (ref 99–110)
CHLORIDE SERPL-SCNC: 98 MMOL/L (ref 99–110)
CO2 BLDA-SCNC: 65 MMOL/L
CO2 SERPL-SCNC: 26 MMOL/L (ref 21–32)
CO2 SERPL-SCNC: 26 MMOL/L (ref 21–32)
CO2 SERPL-SCNC: 27 MMOL/L (ref 21–32)
CO2 SERPL-SCNC: 27 MMOL/L (ref 21–32)
CO2 SERPL-SCNC: 28 MMOL/L (ref 21–32)
COHGB MFR BLDA: 1.5 % (ref 0–1.5)
CREAT SERPL-MCNC: 0.7 MG/DL (ref 0.9–1.3)
CREAT SERPL-MCNC: 0.8 MG/DL (ref 0.9–1.3)
CREAT SERPL-MCNC: 0.8 MG/DL (ref 0.9–1.3)
CREAT SERPL-MCNC: 0.9 MG/DL (ref 0.9–1.3)
CREAT SERPL-MCNC: 0.9 MG/DL (ref 0.9–1.3)
DEPRECATED RDW RBC AUTO: 14.2 % (ref 12.4–15.4)
DESCRIPTION BLOOD BANK: NORMAL
DESCRIPTION BLOOD BANK: NORMAL
GFR SERPLBLD CREATININE-BSD FMLA CKD-EPI: >90 ML/MIN/{1.73_M2}
GLUCOSE BLD-MCNC: 117 MG/DL (ref 70–99)
GLUCOSE BLD-MCNC: 214 MG/DL (ref 70–99)
GLUCOSE BLD-MCNC: 222 MG/DL (ref 70–99)
GLUCOSE BLD-MCNC: 233 MG/DL (ref 70–99)
GLUCOSE SERPL-MCNC: 116 MG/DL (ref 70–99)
GLUCOSE SERPL-MCNC: 122 MG/DL (ref 70–99)
GLUCOSE SERPL-MCNC: 203 MG/DL (ref 70–99)
GLUCOSE SERPL-MCNC: 220 MG/DL (ref 70–99)
GLUCOSE SERPL-MCNC: 255 MG/DL (ref 70–99)
HCO3 BLDA-SCNC: 28 MMOL/L (ref 21–29)
HCT VFR BLD AUTO: 20.6 % (ref 40.5–52.5)
HCT VFR BLD AUTO: 28.4 % (ref 40.5–52.5)
HGB BLD-MCNC: 7 G/DL (ref 13.5–17.5)
HGB BLD-MCNC: 9.7 G/DL (ref 13.5–17.5)
HGB BLDA-MCNC: 9.2 G/DL (ref 13.5–17.5)
MAGNESIUM SERPL-MCNC: 2.2 MG/DL (ref 1.8–2.4)
MCH RBC QN AUTO: 29.8 PG (ref 26–34)
MCHC RBC AUTO-ENTMCNC: 34.2 G/DL (ref 31–36)
MCV RBC AUTO: 87.1 FL (ref 80–100)
METHGB MFR BLDA: 1.1 %
O2 THERAPY: ABNORMAL
PCO2 BLDA: 34.1 MMHG (ref 35–45)
PERFORMED ON: ABNORMAL
PH BLDA: 7.52 [PH] (ref 7.35–7.45)
PLATELET # BLD AUTO: 203 K/UL (ref 135–450)
PMV BLD AUTO: 8 FL (ref 5–10.5)
PO2 BLDA: 53.7 MMHG (ref 75–108)
POTASSIUM SERPL-SCNC: 3.9 MMOL/L (ref 3.5–5.1)
POTASSIUM SERPL-SCNC: 4 MMOL/L (ref 3.5–5.1)
POTASSIUM SERPL-SCNC: 4 MMOL/L (ref 3.5–5.1)
RBC # BLD AUTO: 2.36 M/UL (ref 4.2–5.9)
SAO2 % BLDA: 91.8 %
SODIUM SERPL-SCNC: 137 MMOL/L (ref 136–145)
SODIUM SERPL-SCNC: 138 MMOL/L (ref 136–145)
SODIUM SERPL-SCNC: 138 MMOL/L (ref 136–145)
SODIUM SERPL-SCNC: 139 MMOL/L (ref 136–145)
SODIUM SERPL-SCNC: 140 MMOL/L (ref 136–145)
WBC # BLD AUTO: 9.5 K/UL (ref 4–11)

## 2024-07-21 PROCEDURE — 85027 COMPLETE CBC AUTOMATED: CPT

## 2024-07-21 PROCEDURE — 86923 COMPATIBILITY TEST ELECTRIC: CPT

## 2024-07-21 PROCEDURE — 6370000000 HC RX 637 (ALT 250 FOR IP): Performed by: INTERNAL MEDICINE

## 2024-07-21 PROCEDURE — 83735 ASSAY OF MAGNESIUM: CPT

## 2024-07-21 PROCEDURE — 2580000003 HC RX 258

## 2024-07-21 PROCEDURE — 99024 POSTOP FOLLOW-UP VISIT: CPT | Performed by: NURSE PRACTITIONER

## 2024-07-21 PROCEDURE — 2000000000 HC ICU R&B

## 2024-07-21 PROCEDURE — 85018 HEMOGLOBIN: CPT

## 2024-07-21 PROCEDURE — 6360000002 HC RX W HCPCS

## 2024-07-21 PROCEDURE — 6370000000 HC RX 637 (ALT 250 FOR IP)

## 2024-07-21 PROCEDURE — 80048 BASIC METABOLIC PNL TOTAL CA: CPT

## 2024-07-21 PROCEDURE — 94640 AIRWAY INHALATION TREATMENT: CPT

## 2024-07-21 PROCEDURE — 36430 TRANSFUSION BLD/BLD COMPNT: CPT

## 2024-07-21 PROCEDURE — 82803 BLOOD GASES ANY COMBINATION: CPT

## 2024-07-21 PROCEDURE — P9047 ALBUMIN (HUMAN), 25%, 50ML: HCPCS | Performed by: STUDENT IN AN ORGANIZED HEALTH CARE EDUCATION/TRAINING PROGRAM

## 2024-07-21 PROCEDURE — 2580000003 HC RX 258: Performed by: STUDENT IN AN ORGANIZED HEALTH CARE EDUCATION/TRAINING PROGRAM

## 2024-07-21 PROCEDURE — 6360000002 HC RX W HCPCS: Performed by: INTERNAL MEDICINE

## 2024-07-21 PROCEDURE — 85014 HEMATOCRIT: CPT

## 2024-07-21 PROCEDURE — 94669 MECHANICAL CHEST WALL OSCILL: CPT

## 2024-07-21 PROCEDURE — 30233N1 TRANSFUSION OF NONAUTOLOGOUS RED BLOOD CELLS INTO PERIPHERAL VEIN, PERCUTANEOUS APPROACH: ICD-10-PCS | Performed by: STUDENT IN AN ORGANIZED HEALTH CARE EDUCATION/TRAINING PROGRAM

## 2024-07-21 PROCEDURE — 36600 WITHDRAWAL OF ARTERIAL BLOOD: CPT

## 2024-07-21 PROCEDURE — 6360000002 HC RX W HCPCS: Performed by: STUDENT IN AN ORGANIZED HEALTH CARE EDUCATION/TRAINING PROGRAM

## 2024-07-21 PROCEDURE — 94660 CPAP INITIATION&MGMT: CPT

## 2024-07-21 PROCEDURE — 2700000000 HC OXYGEN THERAPY PER DAY

## 2024-07-21 PROCEDURE — 6370000000 HC RX 637 (ALT 250 FOR IP): Performed by: NURSE PRACTITIONER

## 2024-07-21 PROCEDURE — 86901 BLOOD TYPING SEROLOGIC RH(D): CPT

## 2024-07-21 PROCEDURE — 94761 N-INVAS EAR/PLS OXIMETRY MLT: CPT

## 2024-07-21 PROCEDURE — 2580000003 HC RX 258: Performed by: INTERNAL MEDICINE

## 2024-07-21 PROCEDURE — 6370000000 HC RX 637 (ALT 250 FOR IP): Performed by: STUDENT IN AN ORGANIZED HEALTH CARE EDUCATION/TRAINING PROGRAM

## 2024-07-21 PROCEDURE — 71045 X-RAY EXAM CHEST 1 VIEW: CPT

## 2024-07-21 PROCEDURE — 86900 BLOOD TYPING SEROLOGIC ABO: CPT

## 2024-07-21 PROCEDURE — P9016 RBC LEUKOCYTES REDUCED: HCPCS

## 2024-07-21 PROCEDURE — 94668 MNPJ CHEST WALL SBSQ: CPT

## 2024-07-21 PROCEDURE — 86850 RBC ANTIBODY SCREEN: CPT

## 2024-07-21 RX ORDER — INSULIN GLARGINE 100 [IU]/ML
18 INJECTION, SOLUTION SUBCUTANEOUS 2 TIMES DAILY
Status: DISCONTINUED | OUTPATIENT
Start: 2024-07-22 | End: 2024-07-23

## 2024-07-21 RX ORDER — SODIUM CHLORIDE 9 MG/ML
INJECTION, SOLUTION INTRAVENOUS PRN
Status: DISCONTINUED | OUTPATIENT
Start: 2024-07-21 | End: 2024-07-23 | Stop reason: HOSPADM

## 2024-07-21 RX ORDER — TRAMADOL HYDROCHLORIDE 50 MG/1
100 TABLET ORAL EVERY 6 HOURS PRN
Status: DISCONTINUED | OUTPATIENT
Start: 2024-07-21 | End: 2024-07-23 | Stop reason: HOSPADM

## 2024-07-21 RX ORDER — PANTOPRAZOLE SODIUM 40 MG/1
40 TABLET, DELAYED RELEASE ORAL
Status: DISCONTINUED | OUTPATIENT
Start: 2024-07-21 | End: 2024-07-23 | Stop reason: HOSPADM

## 2024-07-21 RX ORDER — TRAMADOL HYDROCHLORIDE 50 MG/1
50 TABLET ORAL EVERY 6 HOURS PRN
Status: DISCONTINUED | OUTPATIENT
Start: 2024-07-21 | End: 2024-07-23 | Stop reason: HOSPADM

## 2024-07-21 RX ORDER — IPRATROPIUM BROMIDE AND ALBUTEROL SULFATE 2.5; .5 MG/3ML; MG/3ML
1 SOLUTION RESPIRATORY (INHALATION)
Status: DISCONTINUED | OUTPATIENT
Start: 2024-07-21 | End: 2024-07-23 | Stop reason: HOSPADM

## 2024-07-21 RX ORDER — INSULIN LISPRO 100 [IU]/ML
9 INJECTION, SOLUTION INTRAVENOUS; SUBCUTANEOUS
Status: DISCONTINUED | OUTPATIENT
Start: 2024-07-22 | End: 2024-07-23

## 2024-07-21 RX ORDER — FUROSEMIDE 10 MG/ML
40 INJECTION INTRAMUSCULAR; INTRAVENOUS ONCE
Status: COMPLETED | OUTPATIENT
Start: 2024-07-21 | End: 2024-07-21

## 2024-07-21 RX ORDER — CLOPIDOGREL BISULFATE 75 MG/1
75 TABLET ORAL DAILY
Status: DISCONTINUED | OUTPATIENT
Start: 2024-07-21 | End: 2024-07-23 | Stop reason: HOSPADM

## 2024-07-21 RX ADMIN — INSULIN LISPRO 7 UNITS: 100 INJECTION, SOLUTION INTRAVENOUS; SUBCUTANEOUS at 18:02

## 2024-07-21 RX ADMIN — POTASSIUM CHLORIDE 20 MEQ: 1500 TABLET, EXTENDED RELEASE ORAL at 02:01

## 2024-07-21 RX ADMIN — INSULIN GLARGINE 15 UNITS: 100 INJECTION, SOLUTION SUBCUTANEOUS at 19:45

## 2024-07-21 RX ADMIN — METOPROLOL TARTRATE 12.5 MG: 25 TABLET, FILM COATED ORAL at 23:35

## 2024-07-21 RX ADMIN — PANTOPRAZOLE SODIUM 40 MG: 40 TABLET, DELAYED RELEASE ORAL at 18:01

## 2024-07-21 RX ADMIN — INSULIN LISPRO 4 UNITS: 100 INJECTION, SOLUTION INTRAVENOUS; SUBCUTANEOUS at 18:02

## 2024-07-21 RX ADMIN — IPRATROPIUM BROMIDE AND ALBUTEROL SULFATE 1 DOSE: 2.5; .5 SOLUTION RESPIRATORY (INHALATION) at 12:50

## 2024-07-21 RX ADMIN — ALBUMIN (HUMAN) 12.5 G: 0.25 INJECTION, SOLUTION INTRAVENOUS at 00:18

## 2024-07-21 RX ADMIN — ACETAMINOPHEN 1000 MG: 500 TABLET ORAL at 14:30

## 2024-07-21 RX ADMIN — INSULIN LISPRO 2 UNITS: 100 INJECTION, SOLUTION INTRAVENOUS; SUBCUTANEOUS at 19:44

## 2024-07-21 RX ADMIN — IPRATROPIUM BROMIDE AND ALBUTEROL SULFATE 1 DOSE: 2.5; .5 SOLUTION RESPIRATORY (INHALATION) at 16:48

## 2024-07-21 RX ADMIN — ACETAMINOPHEN 1000 MG: 500 TABLET ORAL at 08:23

## 2024-07-21 RX ADMIN — METOPROLOL TARTRATE 12.5 MG: 25 TABLET, FILM COATED ORAL at 18:01

## 2024-07-21 RX ADMIN — ATORVASTATIN CALCIUM 80 MG: 80 TABLET, FILM COATED ORAL at 19:44

## 2024-07-21 RX ADMIN — TRAMADOL HYDROCHLORIDE 50 MG: 50 TABLET ORAL at 12:07

## 2024-07-21 RX ADMIN — INSULIN LISPRO 4 UNITS: 100 INJECTION, SOLUTION INTRAVENOUS; SUBCUTANEOUS at 11:55

## 2024-07-21 RX ADMIN — FUROSEMIDE 5 MG/HR: 10 INJECTION, SOLUTION INTRAMUSCULAR; INTRAVENOUS at 02:02

## 2024-07-21 RX ADMIN — COLCHICINE 0.3 MG: 0.6 TABLET, FILM COATED ORAL at 08:19

## 2024-07-21 RX ADMIN — SODIUM CHLORIDE, PRESERVATIVE FREE 10 ML: 5 INJECTION INTRAVENOUS at 08:21

## 2024-07-21 RX ADMIN — IPRATROPIUM BROMIDE AND ALBUTEROL SULFATE 1 DOSE: 2.5; .5 SOLUTION RESPIRATORY (INHALATION) at 20:12

## 2024-07-21 RX ADMIN — SODIUM CHLORIDE, PRESERVATIVE FREE 10 ML: 5 INJECTION INTRAVENOUS at 19:45

## 2024-07-21 RX ADMIN — COLCHICINE 0.3 MG: 0.6 TABLET, FILM COATED ORAL at 19:44

## 2024-07-21 RX ADMIN — ACETAMINOPHEN 1000 MG: 500 TABLET ORAL at 22:00

## 2024-07-21 RX ADMIN — INSULIN LISPRO 7 UNITS: 100 INJECTION, SOLUTION INTRAVENOUS; SUBCUTANEOUS at 08:19

## 2024-07-21 RX ADMIN — METOPROLOL TARTRATE 12.5 MG: 25 TABLET, FILM COATED ORAL at 00:19

## 2024-07-21 RX ADMIN — SENNOSIDES AND DOCUSATE SODIUM 1 TABLET: 50; 8.6 TABLET ORAL at 08:20

## 2024-07-21 RX ADMIN — METOPROLOL TARTRATE 12.5 MG: 25 TABLET, FILM COATED ORAL at 08:23

## 2024-07-21 RX ADMIN — FUROSEMIDE 40 MG: 10 INJECTION, SOLUTION INTRAMUSCULAR; INTRAVENOUS at 11:07

## 2024-07-21 RX ADMIN — ENOXAPARIN SODIUM 40 MG: 100 INJECTION SUBCUTANEOUS at 08:19

## 2024-07-21 RX ADMIN — ACETAMINOPHEN 1000 MG: 500 TABLET ORAL at 00:19

## 2024-07-21 RX ADMIN — TAMSULOSIN HYDROCHLORIDE 0.4 MG: 0.4 CAPSULE ORAL at 08:20

## 2024-07-21 RX ADMIN — POTASSIUM CHLORIDE 20 MEQ: 1500 TABLET, EXTENDED RELEASE ORAL at 19:44

## 2024-07-21 RX ADMIN — INSULIN GLARGINE 15 UNITS: 100 INJECTION, SOLUTION SUBCUTANEOUS at 08:19

## 2024-07-21 RX ADMIN — INSULIN LISPRO 7 UNITS: 100 INJECTION, SOLUTION INTRAVENOUS; SUBCUTANEOUS at 11:55

## 2024-07-21 RX ADMIN — SENNOSIDES AND DOCUSATE SODIUM 1 TABLET: 50; 8.6 TABLET ORAL at 19:44

## 2024-07-21 RX ADMIN — PANTOPRAZOLE SODIUM 40 MG: 40 TABLET, DELAYED RELEASE ORAL at 08:20

## 2024-07-21 RX ADMIN — SODIUM CHLORIDE 100 MG: 9 INJECTION, SOLUTION INTRAVENOUS at 10:46

## 2024-07-21 RX ADMIN — ASPIRIN 81 MG: 81 TABLET, COATED ORAL at 08:20

## 2024-07-21 RX ADMIN — POTASSIUM CHLORIDE 20 MEQ: 1500 TABLET, EXTENDED RELEASE ORAL at 08:20

## 2024-07-21 RX ADMIN — CLOPIDOGREL BISULFATE 75 MG: 75 TABLET ORAL at 14:30

## 2024-07-21 RX ADMIN — ALBUMIN (HUMAN) 12.5 G: 0.25 INJECTION, SOLUTION INTRAVENOUS at 08:37

## 2024-07-21 RX ADMIN — POTASSIUM CHLORIDE 20 MEQ: 1500 TABLET, EXTENDED RELEASE ORAL at 14:30

## 2024-07-21 RX ADMIN — POTASSIUM CHLORIDE 40 MEQ: 1500 TABLET, EXTENDED RELEASE ORAL at 10:44

## 2024-07-21 RX ADMIN — POLYETHYLENE GLYCOL 3350 17 G: 17 POWDER, FOR SOLUTION ORAL at 08:20

## 2024-07-21 RX ADMIN — FUROSEMIDE 5 MG/HR: 10 INJECTION, SOLUTION INTRAMUSCULAR; INTRAVENOUS at 22:03

## 2024-07-21 NOTE — PLAN OF CARE
Problem: Safety - Adult  Goal: Free from fall injury  7/20/2024 2329 by Jose Stevens RN  Outcome: Progressing  7/20/2024 1833 by Moriah Hirsch RN  Outcome: Progressing     Problem: Pain  Goal: Verbalizes/displays adequate comfort level or baseline comfort level  7/20/2024 2329 by Jose Stevens RN  Outcome: Progressing  7/20/2024 1833 by Moriah Hirsch RN  Outcome: Progressing     Problem: ABCDS Injury Assessment  Goal: Absence of physical injury  7/20/2024 2329 by Jose Stevens RN  Outcome: Progressing  7/20/2024 1833 by Moriah Hirsch RN  Outcome: Progressing     Problem: Discharge Planning  Goal: Discharge to home or other facility with appropriate resources  7/20/2024 2329 by Jose Stevens RN  Outcome: Progressing  Flowsheets (Taken 7/20/2024 2000)  Discharge to home or other facility with appropriate resources:   Identify barriers to discharge with patient and caregiver   Arrange for needed discharge resources and transportation as appropriate   Identify discharge learning needs (meds, wound care, etc)   Arrange for interpreters to assist at discharge as needed   Refer to discharge planning if patient needs post-hospital services based on physician order or complex needs related to functional status, cognitive ability or social support system  7/20/2024 1833 by Moriah Hirsch RN  Outcome: Progressing     Problem: Nutrition Deficit:  Goal: Optimize nutritional status  7/20/2024 2329 by Jose Stevens RN  Outcome: Progressing  7/20/2024 1833 by Moriah Hirsch RN  Outcome: Progressing

## 2024-07-21 NOTE — CONSENT
Informed Consent for Blood Component Transfusion Note    I have discussed with the patient the rationale for blood component transfusion; its benefits in treating or preventing fatigue, organ damage, or death; and its risk which includes mild transfusion reactions, rare risk of blood borne infection, or more serious but rare reactions. I have discussed the alternatives to transfusion, including the risk and consequences of not receiving transfusion. The patient had an opportunity to ask questions and had agreed to proceed with transfusion of blood components.    Electronically signed by Carmela Conti MD on 7/21/24 at 9:03 AM EDT

## 2024-07-22 ENCOUNTER — APPOINTMENT (OUTPATIENT)
Dept: GENERAL RADIOLOGY | Age: 49
DRG: 231 | End: 2024-07-22
Attending: INTERNAL MEDICINE
Payer: COMMERCIAL

## 2024-07-22 LAB
ANION GAP SERPL CALCULATED.3IONS-SCNC: 12 MMOL/L (ref 3–16)
BUN SERPL-MCNC: 17 MG/DL (ref 7–20)
CALCIUM SERPL-MCNC: 9.4 MG/DL (ref 8.3–10.6)
CHLORIDE SERPL-SCNC: 100 MMOL/L (ref 99–110)
CO2 SERPL-SCNC: 29 MMOL/L (ref 21–32)
CREAT SERPL-MCNC: 0.8 MG/DL (ref 0.9–1.3)
DEPRECATED RDW RBC AUTO: 14.6 % (ref 12.4–15.4)
GFR SERPLBLD CREATININE-BSD FMLA CKD-EPI: >90 ML/MIN/{1.73_M2}
GLUCOSE BLD-MCNC: 164 MG/DL (ref 70–99)
GLUCOSE BLD-MCNC: 166 MG/DL (ref 70–99)
GLUCOSE BLD-MCNC: 197 MG/DL (ref 70–99)
GLUCOSE BLD-MCNC: 91 MG/DL (ref 70–99)
GLUCOSE SERPL-MCNC: 165 MG/DL (ref 70–99)
HCT VFR BLD AUTO: 30 % (ref 40.5–52.5)
HGB BLD-MCNC: 9.9 G/DL (ref 13.5–17.5)
MAGNESIUM SERPL-MCNC: 2.1 MG/DL (ref 1.8–2.4)
MCH RBC QN AUTO: 28.8 PG (ref 26–34)
MCHC RBC AUTO-ENTMCNC: 33 G/DL (ref 31–36)
MCV RBC AUTO: 87.3 FL (ref 80–100)
PERFORMED ON: ABNORMAL
PERFORMED ON: NORMAL
PLATELET # BLD AUTO: 252 K/UL (ref 135–450)
PMV BLD AUTO: 7.9 FL (ref 5–10.5)
POTASSIUM SERPL-SCNC: 4.2 MMOL/L (ref 3.5–5.1)
RBC # BLD AUTO: 3.43 M/UL (ref 4.2–5.9)
SODIUM SERPL-SCNC: 141 MMOL/L (ref 136–145)
WBC # BLD AUTO: 7.6 K/UL (ref 4–11)

## 2024-07-22 PROCEDURE — 6370000000 HC RX 637 (ALT 250 FOR IP): Performed by: NURSE PRACTITIONER

## 2024-07-22 PROCEDURE — 94668 MNPJ CHEST WALL SBSQ: CPT

## 2024-07-22 PROCEDURE — 85027 COMPLETE CBC AUTOMATED: CPT

## 2024-07-22 PROCEDURE — 99024 POSTOP FOLLOW-UP VISIT: CPT

## 2024-07-22 PROCEDURE — 6370000000 HC RX 637 (ALT 250 FOR IP)

## 2024-07-22 PROCEDURE — 6370000000 HC RX 637 (ALT 250 FOR IP): Performed by: INTERNAL MEDICINE

## 2024-07-22 PROCEDURE — 6370000000 HC RX 637 (ALT 250 FOR IP): Performed by: STUDENT IN AN ORGANIZED HEALTH CARE EDUCATION/TRAINING PROGRAM

## 2024-07-22 PROCEDURE — 71045 X-RAY EXAM CHEST 1 VIEW: CPT

## 2024-07-22 PROCEDURE — 94669 MECHANICAL CHEST WALL OSCILL: CPT

## 2024-07-22 PROCEDURE — 94640 AIRWAY INHALATION TREATMENT: CPT

## 2024-07-22 PROCEDURE — 2580000003 HC RX 258: Performed by: INTERNAL MEDICINE

## 2024-07-22 PROCEDURE — 2000000000 HC ICU R&B

## 2024-07-22 PROCEDURE — 6360000002 HC RX W HCPCS

## 2024-07-22 PROCEDURE — 6360000002 HC RX W HCPCS: Performed by: INTERNAL MEDICINE

## 2024-07-22 PROCEDURE — 83735 ASSAY OF MAGNESIUM: CPT

## 2024-07-22 PROCEDURE — 97530 THERAPEUTIC ACTIVITIES: CPT

## 2024-07-22 PROCEDURE — 6360000002 HC RX W HCPCS: Performed by: STUDENT IN AN ORGANIZED HEALTH CARE EDUCATION/TRAINING PROGRAM

## 2024-07-22 PROCEDURE — 80048 BASIC METABOLIC PNL TOTAL CA: CPT

## 2024-07-22 RX ORDER — POTASSIUM CHLORIDE 20 MEQ/1
40 TABLET, EXTENDED RELEASE ORAL 2 TIMES DAILY WITH MEALS
Status: DISCONTINUED | OUTPATIENT
Start: 2024-07-22 | End: 2024-07-23 | Stop reason: HOSPADM

## 2024-07-22 RX ORDER — METOPROLOL SUCCINATE 25 MG/1
12.5 TABLET, EXTENDED RELEASE ORAL 2 TIMES DAILY
Status: DISCONTINUED | OUTPATIENT
Start: 2024-07-22 | End: 2024-07-23 | Stop reason: HOSPADM

## 2024-07-22 RX ORDER — INSULIN LISPRO 100 [IU]/ML
0-18 INJECTION, SOLUTION INTRAVENOUS; SUBCUTANEOUS
Status: DISCONTINUED | OUTPATIENT
Start: 2024-07-22 | End: 2024-07-23

## 2024-07-22 RX ORDER — FUROSEMIDE 10 MG/ML
40 INJECTION INTRAMUSCULAR; INTRAVENOUS 2 TIMES DAILY
Status: DISCONTINUED | OUTPATIENT
Start: 2024-07-22 | End: 2024-07-23

## 2024-07-22 RX ORDER — MAGNESIUM SULFATE 1 G/100ML
1000 INJECTION INTRAVENOUS ONCE
Status: COMPLETED | OUTPATIENT
Start: 2024-07-22 | End: 2024-07-22

## 2024-07-22 RX ORDER — INSULIN LISPRO 100 [IU]/ML
0-9 INJECTION, SOLUTION INTRAVENOUS; SUBCUTANEOUS NIGHTLY
Status: DISCONTINUED | OUTPATIENT
Start: 2024-07-22 | End: 2024-07-23

## 2024-07-22 RX ADMIN — IPRATROPIUM BROMIDE AND ALBUTEROL SULFATE 1 DOSE: 2.5; .5 SOLUTION RESPIRATORY (INHALATION) at 15:40

## 2024-07-22 RX ADMIN — IPRATROPIUM BROMIDE AND ALBUTEROL SULFATE 1 DOSE: 2.5; .5 SOLUTION RESPIRATORY (INHALATION) at 09:08

## 2024-07-22 RX ADMIN — POTASSIUM CHLORIDE 40 MEQ: 1500 TABLET, EXTENDED RELEASE ORAL at 17:21

## 2024-07-22 RX ADMIN — POTASSIUM CHLORIDE 20 MEQ: 1500 TABLET, EXTENDED RELEASE ORAL at 01:06

## 2024-07-22 RX ADMIN — FUROSEMIDE 40 MG: 10 INJECTION, SOLUTION INTRAMUSCULAR; INTRAVENOUS at 09:16

## 2024-07-22 RX ADMIN — METOPROLOL SUCCINATE 12.5 MG: 25 TABLET, EXTENDED RELEASE ORAL at 09:14

## 2024-07-22 RX ADMIN — INSULIN LISPRO 3 UNITS: 100 INJECTION, SOLUTION INTRAVENOUS; SUBCUTANEOUS at 17:21

## 2024-07-22 RX ADMIN — ENOXAPARIN SODIUM 40 MG: 100 INJECTION SUBCUTANEOUS at 09:14

## 2024-07-22 RX ADMIN — IPRATROPIUM BROMIDE AND ALBUTEROL SULFATE 1 DOSE: 2.5; .5 SOLUTION RESPIRATORY (INHALATION) at 20:10

## 2024-07-22 RX ADMIN — ACETAMINOPHEN 1000 MG: 500 TABLET ORAL at 14:06

## 2024-07-22 RX ADMIN — PANTOPRAZOLE SODIUM 40 MG: 40 TABLET, DELAYED RELEASE ORAL at 17:21

## 2024-07-22 RX ADMIN — COLCHICINE 0.3 MG: 0.6 TABLET, FILM COATED ORAL at 20:58

## 2024-07-22 RX ADMIN — INSULIN LISPRO 9 UNITS: 100 INJECTION, SOLUTION INTRAVENOUS; SUBCUTANEOUS at 17:21

## 2024-07-22 RX ADMIN — SENNOSIDES AND DOCUSATE SODIUM 1 TABLET: 50; 8.6 TABLET ORAL at 20:59

## 2024-07-22 RX ADMIN — POTASSIUM CHLORIDE 20 MEQ: 1500 TABLET, EXTENDED RELEASE ORAL at 06:30

## 2024-07-22 RX ADMIN — INSULIN LISPRO 2 UNITS: 100 INJECTION, SOLUTION INTRAVENOUS; SUBCUTANEOUS at 09:13

## 2024-07-22 RX ADMIN — IPRATROPIUM BROMIDE AND ALBUTEROL SULFATE 1 DOSE: 2.5; .5 SOLUTION RESPIRATORY (INHALATION) at 12:05

## 2024-07-22 RX ADMIN — INSULIN GLARGINE 18 UNITS: 100 INJECTION, SOLUTION SUBCUTANEOUS at 09:13

## 2024-07-22 RX ADMIN — COLCHICINE 0.3 MG: 0.6 TABLET, FILM COATED ORAL at 09:15

## 2024-07-22 RX ADMIN — ASPIRIN 81 MG: 81 TABLET, COATED ORAL at 09:14

## 2024-07-22 RX ADMIN — PANTOPRAZOLE SODIUM 40 MG: 40 TABLET, DELAYED RELEASE ORAL at 05:49

## 2024-07-22 RX ADMIN — TRAMADOL HYDROCHLORIDE 100 MG: 50 TABLET ORAL at 10:30

## 2024-07-22 RX ADMIN — POTASSIUM CHLORIDE 40 MEQ: 1500 TABLET, EXTENDED RELEASE ORAL at 01:06

## 2024-07-22 RX ADMIN — TRAMADOL HYDROCHLORIDE 50 MG: 50 TABLET ORAL at 20:58

## 2024-07-22 RX ADMIN — ATORVASTATIN CALCIUM 80 MG: 80 TABLET, FILM COATED ORAL at 20:58

## 2024-07-22 RX ADMIN — FUROSEMIDE 40 MG: 10 INJECTION, SOLUTION INTRAMUSCULAR; INTRAVENOUS at 17:28

## 2024-07-22 RX ADMIN — SODIUM CHLORIDE 100 MG: 9 INJECTION, SOLUTION INTRAVENOUS at 11:30

## 2024-07-22 RX ADMIN — CLOPIDOGREL BISULFATE 75 MG: 75 TABLET ORAL at 09:15

## 2024-07-22 RX ADMIN — METOPROLOL SUCCINATE 12.5 MG: 25 TABLET, EXTENDED RELEASE ORAL at 20:59

## 2024-07-22 RX ADMIN — INSULIN LISPRO 9 UNITS: 100 INJECTION, SOLUTION INTRAVENOUS; SUBCUTANEOUS at 09:14

## 2024-07-22 RX ADMIN — MAGNESIUM SULFATE HEPTAHYDRATE 1000 MG: 1 INJECTION, SOLUTION INTRAVENOUS at 09:22

## 2024-07-22 RX ADMIN — INSULIN LISPRO 3 UNITS: 100 INJECTION, SOLUTION INTRAVENOUS; SUBCUTANEOUS at 14:06

## 2024-07-22 RX ADMIN — ACETAMINOPHEN 1000 MG: 500 TABLET ORAL at 05:49

## 2024-07-22 RX ADMIN — TAMSULOSIN HYDROCHLORIDE 0.4 MG: 0.4 CAPSULE ORAL at 09:15

## 2024-07-22 RX ADMIN — INSULIN LISPRO 9 UNITS: 100 INJECTION, SOLUTION INTRAVENOUS; SUBCUTANEOUS at 14:07

## 2024-07-22 RX ADMIN — ACETAMINOPHEN 1000 MG: 500 TABLET ORAL at 20:58

## 2024-07-22 NOTE — FLOWSHEET NOTE
07/22/24 0409   Oxygen Therapy   SpO2 98 %   Pulse Oximeter Device Mode Continuous   Pulse Oximeter Device Location Finger   O2 Device Nasal cannula   O2 Flow Rate (L/min) 3 L/min

## 2024-07-22 NOTE — CARE COORDINATION
VM received from [facility] cannot accept d/t no staff in the area.    [facility] does not accept [facility].    [facility] agreeable to [facility] and [facility] with [facility] [phone] accepted.    CM provided pt with [facility] pamphlet and updated brady.      Breana Mcgrath RN, BSN  [phone]

## 2024-07-22 NOTE — CARE COORDINATION
CM met with pt and encouraged him to choose hc agency.    Therapy aware need updated evaluations from Friday.     Breana Mcgrath RN, BSN  [phone]     Updated at 3:14 PM    Pt chose [facility], CM sent referral over epic at this time.    Breana Mcgrath RN, BSN  [phone]

## 2024-07-22 NOTE — DISCHARGE INSTR - COC
necessary for the continuing treatment of the diagnosis listed and that he requires Home Care for less 30 days.     Update Admission H&P: No change in H&P    PHYSICIAN SIGNATURE:  Electronically signed by Rogerio Mejia PA-C on 7/23/24 at 12:07 PM EDT

## 2024-07-22 NOTE — PLAN OF CARE
Problem: Safety - Adult  Goal: Free from fall injury  Outcome: Progressing     Problem: Pain  Goal: Verbalizes/displays adequate comfort level or baseline comfort level  Outcome: Progressing     Problem: ABCDS Injury Assessment  Goal: Absence of physical injury  Outcome: Progressing     Problem: Discharge Planning  Goal: Discharge to home or other facility with appropriate resources  Outcome: Progressing  Flowsheets (Taken 7/21/2024 2000)  Discharge to home or other facility with appropriate resources:   Identify barriers to discharge with patient and caregiver   Arrange for needed discharge resources and transportation as appropriate   Identify discharge learning needs (meds, wound care, etc)   Arrange for interpreters to assist at discharge as needed   Refer to discharge planning if patient needs post-hospital services based on physician order or complex needs related to functional status, cognitive ability or social support system     Problem: Nutrition Deficit:  Goal: Optimize nutritional status  Outcome: Progressing

## 2024-07-23 ENCOUNTER — APPOINTMENT (OUTPATIENT)
Dept: GENERAL RADIOLOGY | Age: 49
DRG: 231 | End: 2024-07-23
Attending: INTERNAL MEDICINE
Payer: COMMERCIAL

## 2024-07-23 VITALS
BODY MASS INDEX: 25.74 KG/M2 | OXYGEN SATURATION: 96 % | DIASTOLIC BLOOD PRESSURE: 71 MMHG | RESPIRATION RATE: 17 BRPM | SYSTOLIC BLOOD PRESSURE: 118 MMHG | HEART RATE: 93 BPM | HEIGHT: 70 IN | TEMPERATURE: 98 F | WEIGHT: 179.78 LBS

## 2024-07-23 LAB
ANION GAP SERPL CALCULATED.3IONS-SCNC: 11 MMOL/L (ref 3–16)
BUN SERPL-MCNC: 17 MG/DL (ref 7–20)
CALCIUM SERPL-MCNC: 9 MG/DL (ref 8.3–10.6)
CHLORIDE SERPL-SCNC: 97 MMOL/L (ref 99–110)
CO2 SERPL-SCNC: 30 MMOL/L (ref 21–32)
CREAT SERPL-MCNC: 0.7 MG/DL (ref 0.9–1.3)
DEPRECATED RDW RBC AUTO: 14.9 % (ref 12.4–15.4)
GFR SERPLBLD CREATININE-BSD FMLA CKD-EPI: >90 ML/MIN/{1.73_M2}
GLUCOSE BLD-MCNC: 249 MG/DL (ref 70–99)
GLUCOSE SERPL-MCNC: 111 MG/DL (ref 70–99)
HCT VFR BLD AUTO: 28.9 % (ref 40.5–52.5)
HGB BLD-MCNC: 9.6 G/DL (ref 13.5–17.5)
MAGNESIUM SERPL-MCNC: 2.2 MG/DL (ref 1.8–2.4)
MCH RBC QN AUTO: 29 PG (ref 26–34)
MCHC RBC AUTO-ENTMCNC: 33.1 G/DL (ref 31–36)
MCV RBC AUTO: 87.7 FL (ref 80–100)
PERFORMED ON: ABNORMAL
PLATELET # BLD AUTO: 285 K/UL (ref 135–450)
PMV BLD AUTO: 8.2 FL (ref 5–10.5)
POTASSIUM SERPL-SCNC: 4.2 MMOL/L (ref 3.5–5.1)
RBC # BLD AUTO: 3.29 M/UL (ref 4.2–5.9)
SODIUM SERPL-SCNC: 138 MMOL/L (ref 136–145)
WBC # BLD AUTO: 7.2 K/UL (ref 4–11)

## 2024-07-23 PROCEDURE — 97110 THERAPEUTIC EXERCISES: CPT

## 2024-07-23 PROCEDURE — 71045 X-RAY EXAM CHEST 1 VIEW: CPT

## 2024-07-23 PROCEDURE — 97530 THERAPEUTIC ACTIVITIES: CPT

## 2024-07-23 PROCEDURE — 97535 SELF CARE MNGMENT TRAINING: CPT

## 2024-07-23 PROCEDURE — 94640 AIRWAY INHALATION TREATMENT: CPT

## 2024-07-23 PROCEDURE — 94761 N-INVAS EAR/PLS OXIMETRY MLT: CPT

## 2024-07-23 PROCEDURE — 94668 MNPJ CHEST WALL SBSQ: CPT

## 2024-07-23 PROCEDURE — 6370000000 HC RX 637 (ALT 250 FOR IP): Performed by: NURSE PRACTITIONER

## 2024-07-23 PROCEDURE — 94669 MECHANICAL CHEST WALL OSCILL: CPT

## 2024-07-23 PROCEDURE — 80048 BASIC METABOLIC PNL TOTAL CA: CPT

## 2024-07-23 PROCEDURE — 6370000000 HC RX 637 (ALT 250 FOR IP)

## 2024-07-23 PROCEDURE — 99024 POSTOP FOLLOW-UP VISIT: CPT

## 2024-07-23 PROCEDURE — 6370000000 HC RX 637 (ALT 250 FOR IP): Performed by: STUDENT IN AN ORGANIZED HEALTH CARE EDUCATION/TRAINING PROGRAM

## 2024-07-23 PROCEDURE — 6360000002 HC RX W HCPCS

## 2024-07-23 PROCEDURE — 83735 ASSAY OF MAGNESIUM: CPT

## 2024-07-23 PROCEDURE — 85027 COMPLETE CBC AUTOMATED: CPT

## 2024-07-23 RX ORDER — INSULIN LISPRO 100 [IU]/ML
5 INJECTION, SOLUTION INTRAVENOUS; SUBCUTANEOUS
Qty: 20 ML | Refills: 1 | Status: SHIPPED | OUTPATIENT
Start: 2024-07-23 | End: 2025-04-16

## 2024-07-23 RX ORDER — TRAMADOL HYDROCHLORIDE 50 MG/1
50 TABLET ORAL EVERY 6 HOURS PRN
Qty: 28 TABLET | Refills: 0 | Status: SHIPPED | OUTPATIENT
Start: 2024-07-23 | End: 2024-07-30

## 2024-07-23 RX ORDER — PANTOPRAZOLE SODIUM 40 MG/1
40 TABLET, DELAYED RELEASE ORAL DAILY
Qty: 30 TABLET | Refills: 0 | Status: SHIPPED | OUTPATIENT
Start: 2024-07-23 | End: 2024-08-22

## 2024-07-23 RX ORDER — POLYETHYLENE GLYCOL 3350 17 G/17G
17 POWDER, FOR SOLUTION ORAL DAILY
Qty: 30 PACKET | Refills: 0 | Status: SHIPPED | OUTPATIENT
Start: 2024-07-24 | End: 2024-08-23

## 2024-07-23 RX ORDER — UBIQUINOL 100 MG
1 CAPSULE ORAL 4 TIMES DAILY
Qty: 100 EACH | Refills: 3 | Status: SHIPPED | OUTPATIENT
Start: 2024-07-23

## 2024-07-23 RX ORDER — COLCHICINE 0.6 MG/1
0.3 TABLET ORAL 2 TIMES DAILY
Qty: 8 TABLET | Refills: 0 | Status: SHIPPED | OUTPATIENT
Start: 2024-07-23 | End: 2024-07-31

## 2024-07-23 RX ORDER — INSULIN GLARGINE 100 [IU]/ML
18 INJECTION, SOLUTION SUBCUTANEOUS DAILY
Status: DISCONTINUED | OUTPATIENT
Start: 2024-07-23 | End: 2024-07-23 | Stop reason: HOSPADM

## 2024-07-23 RX ORDER — KETOROLAC TROMETHAMINE 30 MG/ML
1 INJECTION, SOLUTION INTRAMUSCULAR; INTRAVENOUS ONCE
Qty: 1 EACH | Refills: 0 | Status: SHIPPED | OUTPATIENT
Start: 2024-07-23 | End: 2024-07-23 | Stop reason: HOSPADM

## 2024-07-23 RX ORDER — ATORVASTATIN CALCIUM 80 MG/1
80 TABLET, FILM COATED ORAL NIGHTLY
Qty: 30 TABLET | Refills: 3 | Status: SHIPPED | OUTPATIENT
Start: 2024-07-23

## 2024-07-23 RX ORDER — INSULIN LISPRO 100 [IU]/ML
0-4 INJECTION, SOLUTION INTRAVENOUS; SUBCUTANEOUS NIGHTLY
Status: DISCONTINUED | OUTPATIENT
Start: 2024-07-23 | End: 2024-07-23 | Stop reason: HOSPADM

## 2024-07-23 RX ORDER — FUROSEMIDE 40 MG/1
40 TABLET ORAL 2 TIMES DAILY
Status: DISCONTINUED | OUTPATIENT
Start: 2024-07-23 | End: 2024-07-23 | Stop reason: HOSPADM

## 2024-07-23 RX ORDER — INSULIN LISPRO 100 [IU]/ML
0-8 INJECTION, SOLUTION INTRAVENOUS; SUBCUTANEOUS
Qty: 10 ML | Refills: 1 | Status: SHIPPED | OUTPATIENT
Start: 2024-07-23 | End: 2024-10-21

## 2024-07-23 RX ORDER — INSULIN LISPRO 100 [IU]/ML
5 INJECTION, SOLUTION INTRAVENOUS; SUBCUTANEOUS
Status: DISCONTINUED | OUTPATIENT
Start: 2024-07-23 | End: 2024-07-23 | Stop reason: HOSPADM

## 2024-07-23 RX ORDER — INSULIN LISPRO 100 [IU]/ML
0-8 INJECTION, SOLUTION INTRAVENOUS; SUBCUTANEOUS
Status: DISCONTINUED | OUTPATIENT
Start: 2024-07-23 | End: 2024-07-23 | Stop reason: HOSPADM

## 2024-07-23 RX ORDER — CLOPIDOGREL BISULFATE 75 MG/1
75 TABLET ORAL DAILY
Qty: 30 TABLET | Refills: 3 | Status: SHIPPED | OUTPATIENT
Start: 2024-07-24

## 2024-07-23 RX ORDER — METOPROLOL SUCCINATE 25 MG/1
12.5 TABLET, EXTENDED RELEASE ORAL 2 TIMES DAILY
Qty: 30 TABLET | Refills: 3 | Status: SHIPPED | OUTPATIENT
Start: 2024-07-23

## 2024-07-23 RX ORDER — FUROSEMIDE 40 MG/1
TABLET ORAL
Qty: 24 TABLET | Refills: 0 | Status: SHIPPED | OUTPATIENT
Start: 2024-07-23 | End: 2024-08-11

## 2024-07-23 RX ORDER — BLOOD-GLUCOSE SENSOR
1 EACH MISCELLANEOUS
Qty: 2 EACH | Refills: 3 | Status: SHIPPED | OUTPATIENT
Start: 2024-07-23

## 2024-07-23 RX ORDER — ASPIRIN 81 MG/1
81 TABLET ORAL DAILY
Qty: 30 TABLET | Refills: 3 | Status: SHIPPED | OUTPATIENT
Start: 2024-07-24

## 2024-07-23 RX ORDER — INSULIN GLARGINE 100 [IU]/ML
18 INJECTION, SOLUTION SUBCUTANEOUS DAILY
Qty: 10 ML | Refills: 2 | Status: SHIPPED | OUTPATIENT
Start: 2024-07-24

## 2024-07-23 RX ORDER — POTASSIUM CHLORIDE 20 MEQ/1
TABLET, EXTENDED RELEASE ORAL
Qty: 48 TABLET | Refills: 0 | Status: SHIPPED | OUTPATIENT
Start: 2024-07-23 | End: 2024-08-11

## 2024-07-23 RX ADMIN — ACETAMINOPHEN 1000 MG: 500 TABLET ORAL at 06:20

## 2024-07-23 RX ADMIN — IPRATROPIUM BROMIDE AND ALBUTEROL SULFATE 1 DOSE: 2.5; .5 SOLUTION RESPIRATORY (INHALATION) at 09:47

## 2024-07-23 RX ADMIN — IPRATROPIUM BROMIDE AND ALBUTEROL SULFATE 1 DOSE: 2.5; .5 SOLUTION RESPIRATORY (INHALATION) at 12:16

## 2024-07-23 RX ADMIN — METFORMIN HYDROCHLORIDE 500 MG: 500 TABLET ORAL at 09:05

## 2024-07-23 RX ADMIN — CLOPIDOGREL BISULFATE 75 MG: 75 TABLET ORAL at 09:01

## 2024-07-23 RX ADMIN — PANTOPRAZOLE SODIUM 40 MG: 40 TABLET, DELAYED RELEASE ORAL at 16:32

## 2024-07-23 RX ADMIN — COLCHICINE 0.3 MG: 0.6 TABLET, FILM COATED ORAL at 09:00

## 2024-07-23 RX ADMIN — PANTOPRAZOLE SODIUM 40 MG: 40 TABLET, DELAYED RELEASE ORAL at 09:01

## 2024-07-23 RX ADMIN — FUROSEMIDE 40 MG: 40 TABLET ORAL at 09:01

## 2024-07-23 RX ADMIN — IPRATROPIUM BROMIDE AND ALBUTEROL SULFATE 1 DOSE: 2.5; .5 SOLUTION RESPIRATORY (INHALATION) at 16:15

## 2024-07-23 RX ADMIN — ASPIRIN 81 MG: 81 TABLET, COATED ORAL at 09:01

## 2024-07-23 RX ADMIN — INSULIN LISPRO 5 UNITS: 100 INJECTION, SOLUTION INTRAVENOUS; SUBCUTANEOUS at 11:29

## 2024-07-23 RX ADMIN — TAMSULOSIN HYDROCHLORIDE 0.4 MG: 0.4 CAPSULE ORAL at 09:02

## 2024-07-23 RX ADMIN — INSULIN GLARGINE 18 UNITS: 100 INJECTION, SOLUTION SUBCUTANEOUS at 09:03

## 2024-07-23 RX ADMIN — INSULIN LISPRO 2 UNITS: 100 INJECTION, SOLUTION INTRAVENOUS; SUBCUTANEOUS at 12:52

## 2024-07-23 RX ADMIN — FUROSEMIDE 40 MG: 40 TABLET ORAL at 16:32

## 2024-07-23 RX ADMIN — POTASSIUM CHLORIDE 40 MEQ: 1500 TABLET, EXTENDED RELEASE ORAL at 16:31

## 2024-07-23 RX ADMIN — ENOXAPARIN SODIUM 40 MG: 100 INJECTION SUBCUTANEOUS at 09:01

## 2024-07-23 RX ADMIN — POTASSIUM CHLORIDE 40 MEQ: 1500 TABLET, EXTENDED RELEASE ORAL at 09:01

## 2024-07-23 RX ADMIN — METFORMIN HYDROCHLORIDE 500 MG: 500 TABLET ORAL at 16:32

## 2024-07-23 RX ADMIN — METOPROLOL SUCCINATE 12.5 MG: 25 TABLET, EXTENDED RELEASE ORAL at 09:00

## 2024-07-23 NOTE — DISCHARGE INSTRUCTIONS
Appointment scheduled:  Hospital follow up with Dr. Pate's nurse practitioner Zuleika Glez, Monday 7/29/24 at 9 am. Bring your hospital discharge paperwork and blood sugar log.      Insulin Regimen    Breakfast, Lunch, & Dinner  Humalog (Dark Blue Pen)    Keeps working for 3-6 hours  Set amount with meals Plus Sliding Scale       5 units     + If your blood sugar is:                                 No added Insulin  200-249                              Add 2 Units  250-299                              Add 4 Units  300-349                              Add 6 Units  Over 349                             Add 8 Units       Every morning  Lantus (Light Nowak Pen)    Keeps working for up to 24 hours    18 units         Discharge Instructions Following Open Heart Surgery            Supplies you will need at home:  Accurate Scale  Digital Thermometer  Antibacterial Soap  Clean Wash Cloths  Someone to be with you for one week      Activity Instructions:  Do not lift, push, or pull anything over 5 pounds for 8 weeks from the day of surgery. This could prevent your breastbone from healing properly. (A gallon of milk is more than 5 pounds so you should buy ½ gallons). When you are given permission from your surgeon to begin lifting again, do so gradually. You will need time to build your muscle strength.    Housework - Do not cut the grass, vacuum, sweep or do any heavy housework.    Work - Returning to work may take 4 weeks if you have a desk job. But if you have a more physical job you may need to wait up to 12 weeks. Consult your doctor.    Driving - Do not drive a car or any vehicle for 4 weeks from the day of surgery. You cannot drive a truck, tractor or  for 8 weeks from the day of surgery. After 4 weeks, you can drive vehicles with power steering only. Do not drive while on pain medication.    Riding - You may ride in a car for local trips, up to 45 minutes. If you have to travel further then 2

## 2024-07-23 NOTE — CARE COORDINATION
Case Management -  Discharge Note      Patient Name: Landon Ordoñez                   YOB: 1975            Readmission Risk (Low < 19, Mod (19-27), High > 27): Readmission Risk Score: 10.5    Current PCP: Felipe Pate MD    ([facility]) Important Message from Medicare:    Date: n/a     PT AM-PAC: 20 /24  OT AM-PAC: 20 /24    Patient/patient representative has been educated on the benefits of home care  as well as the possible risks of declining recommended services. Patient/patient representative has acknowledged the information provided and decided on the following discharge plan. Patient/ patient representative has been provided freedom of choice regarding service provider, supported by basic dialogue that supports the patient's individualized plan of care/goals.    [facility]  [facility],   [place] [address]  Phone: [phone]  Fax: [phone]     Financial    Payor: [employer] / Plan: [employer] / Product Type: *No Product type* /     Pharmacy:  Potential assistance Purchasing Medications: No  Meds-to-Beds request: Yes      [id number] - [place], [place] - [address] - P [phone] - F [phone] [address] [facility] [address] [facility] [address]  Phone: [phone] Fax: [phone]      Notes:    Additional Case Management Notes: Patient discharged 7/23/2024 [facility].   All discharge needs met per case management.    Breana Mcgrath RN, BSN  [phone]

## 2024-07-23 NOTE — CARE COORDINATION
CM was notified by RN that pt is going to have trouble with transportation. Pt does not have medicaid. CM suggested [employer] and also provided with Transportation assistance number in [facility] off of [facility] assistance website [phone].     CM also suggested calling his [facility] but not sure if they have assistance with transportation given pt's age.     Breana Mcgrath RN, BSN  [phone]

## 2024-07-24 NOTE — PROGRESS NOTES
CVTS Cardiothoracic Progress Note:    Surgery: 7/17/24  CABG X 4 (LIMA TO LAD, SVG TO OM2, SVG TO PDA, SVG TO DIAG), TCPB, AYSHA, DOPPLER FLOW VERIFICATION OF BYPASS GRAFTS WITH MEDISTIM PROBE 3.0, EVH OF RIGHT SAPHENOUS VEIN, INSERTION OF TEMPORARY ATRIAL AND VENTRICULAR PACING WIRES  Surgeon: Dr. Conti  POD #: 1    Subjective:   7/18: pt sitting in chair on NC @ 4L's reports of left arm numbness; currently on Milrinone 0.25, levo 0.06; SBP's 90's - 105.     Vital Signs: BP (!) 104/54   Pulse (!) 108   Temp 99.8 °F (37.7 °C) (Bladder)   Resp 18   Ht 1.778 m (5' 10\")   Wt 86 kg (189 lb 9.5 oz)   SpO2 94%   BMI 27.20 kg/m²  O2 Flow Rate (L/min): 4 L/min     Admission Weight: Weight - Scale: 79.3 kg (174 lb 13.2 oz)    7/17: 78.3 kg Pre-op   7/18 86 kg    Intake/Output:   Intake/Output Summary (Last 24 hours) at 7/18/2024 1318  Last data filed at 7/18/2024 1256  Gross per 24 hour   Intake 3158.29 ml   Output 3445 ml   Net -286.71 ml        Chest tubes/Drains:    R. mediastinal  210cc/24hrs, 145cc/12 hrs overnight    Left pleural  190cc/24hrs, 165cc/12 hrs overnight    Right pleural  10cc/24hrs, 5cc/12 hrs overnight   no airleaks noted in either tube      LABORATORY DATA:    CBC:   Recent Labs     07/17/24  1634 07/17/24  1725 07/17/24  1730 07/17/24  2048 07/17/24  2324 07/18/24  0445   WBC 18.0*  --  15.0*  --   --  14.7*   HGB 10.7*   < > 10.5* 10.3* 9.8* 8.9*   HCT 30.6*  --  30.3*  --   --  27.3*   MCV 85.7  --  86.1  --   --  86.8   *  --  128*  --   --  143    < > = values in this interval not displayed.     BMP:   Recent Labs     07/16/24  0355 07/17/24  1741 07/18/24  0445    144 143   K 3.5 3.7 4.6    112* 111*   CO2 26 24 24   BUN 13 8 9   CREATININE 0.8* 0.6* 0.7*     MG:    Recent Labs     07/18/24  0445   MG 2.50*      Cardiac Enzymes: No results for input(s): \"CKTOTAL\", \"CKMB\", \"CKMBINDEX\", \"TROPONINI\" in the last 72 hours.  PT/INR: 
                                  CVTS Cardiothoracic Progress Note:    Surgery: 7/17/24  CABG X 4 (LIMA TO LAD, SVG TO OM2, SVG TO PDA, SVG TO DIAG), TCPB, AYSHA, DOPPLER FLOW VERIFICATION OF BYPASS GRAFTS WITH MEDISTIM PROBE 3.0, EVH OF RIGHT SAPHENOUS VEIN, INSERTION OF TEMPORARY ATRIAL AND VENTRICULAR PACING WIRES  Surgeon: Dr. Conti  POD #: 2    Subjective:   7/18: pt sitting in chair on NC @ 4L's reports of left arm numbness; currently on Milrinone 0.25, levo 0.06; SBP's 90's - 105.     7/19: pt seen and examined this morning, states left hand numbness slightly better, strength improved from yesterday, on NC@ 5L's     Vital Signs: /65   Pulse 97   Temp 98.7 °F (37.1 °C) (Temporal)   Resp 18   Ht 1.778 m (5' 10\")   Wt 87.4 kg (192 lb 10.9 oz)   SpO2 92%   BMI 27.65 kg/m²  O2 Flow Rate (L/min): 5 L/min     Admission Weight: Weight - Scale: 79.3 kg (174 lb 13.2 oz)    7/17: 78.3 kg Pre-op   7/18 86 kg  7/19: 87.4 kg    Intake/Output:   Intake/Output Summary (Last 24 hours) at 7/19/2024 1411  Last data filed at 7/19/2024 1200  Gross per 24 hour   Intake 1743.2 ml   Output 2120 ml   Net -376.8 ml          Chest tubes/Drains:    R. mediastinal  80cc/24hrs, 60cc/12 hrs overnight    Left pleural  240cc/24hrs, 160cc/12 hrs overnight    Right pleural removed   no airleaks noted in either tube      LABORATORY DATA:    CBC:   Recent Labs     07/17/24  1730 07/17/24  2048 07/17/24  2324 07/18/24  0445 07/19/24  0352   WBC 15.0*  --   --  14.7* 14.8*   HGB 10.5*   < > 9.8* 8.9* 7.8*   HCT 30.3*  --   --  27.3* 24.2*   MCV 86.1  --   --  86.8 88.6   *  --   --  143 97*    < > = values in this interval not displayed.       BMP:   Recent Labs     07/17/24  1741 07/18/24  0445 07/19/24  0352    143 140   K 3.7 4.6 4.2   * 111* 106   CO2 24 24 22   BUN 8 9 13   CREATININE 0.6* 0.7* 0.7*       MG:    Recent Labs     07/18/24  0445 07/19/24  0352   MG 2.50* 2.40        Cardiac Enzymes: No 
                                  CVTS Cardiothoracic Progress Note:    Surgery: 7/17/24 CABG X 4 (LIMA TO LAD, SVG TO OM2, SVG TO PDA, SVG TO DIAG), TCPB, AYSHA, DOPPLER FLOW VERIFICATION OF BYPASS GRAFTS WITH MEDISTIM PROBE 3.0, EVH OF RIGHT SAPHENOUS VEIN, INSERTION OF TEMPORARY ATRIAL AND VENTRICULAR PACING WIRES  Surgeon: Dr. Conti  POD #: 3    Subjective:   7/18: pt sitting in chair on NC @ 4L's reports of left arm numbness; currently on Milrinone 0.25, levo 0.06; SBP's 90's - 105.     7/19: pt seen and examined this morning, states left hand numbness slightly better, strength improved from yesterday, on NC@ 5L's     7/20 Up in chair, reports feeling sore but better than yesterday. Remains in SR mid 80's. On 8L HFNC with sat 94%.     Vital Signs: /77   Pulse (!) 103   Temp 98.1 °F (36.7 °C) (Temporal)   Resp 18   Ht 1.778 m (5' 10\")   Wt 87.6 kg (193 lb 3.7 oz)   SpO2 94%   BMI 27.73 kg/m²  O2 Flow Rate (L/min): 8 L/min     Admission Weight: Weight - Scale: 79.3 kg (174 lb 13.2 oz)    7/17: 78.3 kg Pre-op   7/18 86 kg  7/19: 87.4 kg  7/20 87.6 kg, +9.3 kg from pre-op weight    Intake/Output:   Intake/Output Summary (Last 24 hours) at 7/20/2024 0957  Last data filed at 7/20/2024 0642  Gross per 24 hour   Intake 529.48 ml   Output 1885 ml   Net -1355.52 ml      UOP: 1825 ml in 24 hrs; Cr 0.7    Chest tubes/Drains:    Rt mediastinal and pleural chest tube removed.    Left pleural  220cc/24hrs  no airleak noted     LABORATORY DATA:    CBC:   Recent Labs     07/18/24  0445 07/19/24  0352 07/20/24  0421   WBC 14.7* 14.8* 12.3*   HGB 8.9* 7.8* 7.2*   HCT 27.3* 24.2* 21.6*   MCV 86.8 88.6 87.7    97* 126*     BMP:   Recent Labs     07/18/24  0445 07/19/24  0352 07/20/24  0421    140 137   K 4.6 4.2 4.1   * 106 102   CO2 24 22 26   BUN 9 13 19   CREATININE 0.7* 0.7* 0.7*     MG:    Recent Labs     07/18/24 0445 07/19/24  0352 07/20/24  0421   MG 2.50* 2.40 2.10      PT/INR:   Recent 
                                  CVTS Cardiothoracic Progress Note:    Surgery: 7/17/24 CABG X 4 (LIMA TO LAD, SVG TO OM2, SVG TO PDA, SVG TO DIAG), TCPB, AYSHA, DOPPLER FLOW VERIFICATION OF BYPASS GRAFTS WITH MEDISTIM PROBE 3.0, EVH OF RIGHT SAPHENOUS VEIN, INSERTION OF TEMPORARY ATRIAL AND VENTRICULAR PACING WIRES  Surgeon: Dr. Conti  POD #: 4    Subjective:   7/18: pt sitting in chair on NC @ 4L's reports of left arm numbness; currently on Milrinone 0.25, levo 0.06; SBP's 90's - 105.     7/19: pt seen and examined this morning, states left hand numbness slightly better, strength improved from yesterday, on NC@ 5L's     7/20 Up in chair, reports feeling sore but better than yesterday. Remains in SR mid 80's. On 8L HFNC with sat 94%.     7/21 Up in chair, cxr this morning appears worse. He needs to work at pulmonary toileting and ambulation. We have added breathing treatments as well as BiPap QHS and during naps.     Vital Signs: /73   Pulse 91   Temp 99 °F (37.2 °C)   Resp 18   Ht 1.778 m (5' 10\")   Wt 86 kg (189 lb 11.3 oz)   SpO2 97%   BMI 27.22 kg/m²  O2 Flow Rate (L/min): 14 L/min     Admission Weight: Weight - Scale: 79.3 kg (174 lb 13.2 oz)    7/17: 78.3 kg Pre-op   7/18 86 kg  7/19: 87.4 kg  7/20 87.6 kg, +9.3 kg from pre-op weight    Intake/Output:   Intake/Output Summary (Last 24 hours) at 7/21/2024 1221  Last data filed at 7/21/2024 1154  Gross per 24 hour   Intake 388.96 ml   Output 2590 ml   Net -2201.04 ml      UOP: 3240 ml in 24 hrs; Cr 0.7    Chest tubes/Drains:    Rt mediastinal and pleural chest tube removed.    Left pleural  220cc/24hrs  no airleak noted     LABORATORY DATA:    CBC:   Recent Labs     07/19/24  0352 07/20/24  0421 07/20/24  1225 07/21/24  0615   WBC 14.8* 12.3*  --  9.5   HGB 7.8* 7.2* 7.4* 7.0*   HCT 24.2* 21.6* 21.8* 20.6*   MCV 88.6 87.7  --  87.1   PLT 97* 126*  --  203     BMP:   Recent Labs     07/20/24  1820 07/21/24  0010 07/21/24  0615    139 140 
                                  CVTS Cardiothoracic Progress Note:    Surgery: 7/17/24 CABG X 4 (LIMA TO LAD, SVG TO OM2, SVG TO PDA, SVG TO DIAG), TCPB, AYSHA, DOPPLER FLOW VERIFICATION OF BYPASS GRAFTS WITH MEDISTIM PROBE 3.0, EVH OF RIGHT SAPHENOUS VEIN, INSERTION OF TEMPORARY ATRIAL AND VENTRICULAR PACING WIRES  Surgeon: Dr. Conti  POD #: 6    Subjective:   7/18: pt sitting in chair on NC @ 4L's reports of left arm numbness; currently on Milrinone 0.25, levo 0.06; SBP's 90's - 105.     7/19: pt seen and examined this morning, states left hand numbness slightly better, strength improved from yesterday, on NC@ 5L's     7/20 Up in chair, reports feeling sore but better than yesterday. Remains in SR mid 80's. On 8L HFNC with sat 94%.     7/21 Up in chair, cxr this morning appears worse. He needs to work at pulmonary toileting and ambulation. We have added breathing treatments as well as BiPap QHS and during naps.     7/22: Resting in bed on 3L O2. Reports he feels better today. Pain 4/10.     7/23: Patient seen and examined this am resting comfortably in bed. He is currently on RA, continues to feel better, able to ambulate     Vital Signs: /76   Pulse 88   Temp 98 °F (36.7 °C) (Temporal)   Resp 18   Ht 1.778 m (5' 10\")   Wt 81.5 kg (179 lb 12.6 oz)   SpO2 96%   BMI 25.80 kg/m²  O2 Flow Rate (L/min): 2 L/min (weaned to room air)     Admission Weight: Weight - Scale: 79.3 kg (174 lb 13.2 oz)    7/17: 78.3 kg Pre-op   7/18 86 kg  7/19: 87.4 kg  7/20 87.6 kg, +9.3 kg from pre-op weight  7/23: 81.5 kg    Intake/Output:   Intake/Output Summary (Last 24 hours) at 7/23/2024 1120  Last data filed at 7/23/2024 0322  Gross per 24 hour   Intake 480 ml   Output 1220 ml   Net -740 ml          Chest tubes/Drains:    Rt mediastinal and pleural chest tube removed.    Left pleural  removed  no airleak noted     LABORATORY DATA:    CBC:   Recent Labs     07/21/24  0615 07/21/24  1800 07/22/24  0605 07/23/24  0530 
    V2.0    [facility] Progress Note      Name:  Landon Ordoñez /Age/Sex: 1975  (49 y.o. male)   MRN & CSN:  [id number] & [id number] Encounter Date/Time: 2024 11:46 AM EDT   Location:  [facility]2908[facility]2908[facility]01 PCP: Felipe Pate MD     Attending:Nia Gray MD       Hospital Day: 4    Assessment and Recommendations   Landon Ordoñez is a 49 y.o. male with pmh of with a pmh of T2DM, HLD transferred from [facility] for repeat evaluation for CABG. He had presented care on 2024 with complaints of burning chest pain radiating to both arms. LHC done on 7/15/2024 revealed CAD, multiple vessel.       Plan:     Multivessel CAD, NSTEMI:  TTE 7/15/2024: LVEF 50 to 55% with RWMA.    CT surgery consult appreciated: s/p CABG 2024.  Continue aspirin, statin, and colchicine.  Further management per CT surgery.    T2DM with hyperglycemia:  Reports noncompliance with medications  A1c 9.8% 7/15/24.  Currently on insulin drip  DM educator and endocrinology consults appreciated.     Hyperlipidemia: Continue statin.      Diet ADULT DIET; Regular; 5 carb choices (75 gm/meal); Low Fat/Low Chol/High Fiber/2 gm Na; 1500 ml   DVT Prophylaxis [] Lovenox, [x]  Heparin drip, [] SCDs, [] Ambulation,  [] Eliquis, [] Xarelto  [] Coumadin   Code Status Full Code   Disposition From: Home  Expected Disposition: Home  Estimated Date of Discharge: unclear at this time  Patient requires continued admission due to MV-CAD   Surrogate Decision Maker/ Glen Beckford (Parent)  [phone]      Personally reviewed Lab Studies and Imaging     Subjective:     Chief Complaint: Multivessel CAD    Patient seen and examined.   Complaining of chest pain  No dyspnea, fever or chills.        Review of Systems:      A 10-12 system review obtained and updated today and is unremarkable except as mentioned  in my interval history.     Objective:     Intake/Output Summary (Last 24 hours) at 2024 1217  Last data filed at 2024 1113  Gross per 24 
    V2.0    [facility] Progress Note      Name:  Landon Ordoñez /Age/Sex: 1975  (49 y.o. male)   MRN & CSN:  [id number] & [id number] Encounter Date/Time: 2024 11:46 AM EDT   Location:  [facility]08[facility]08[facility]01 PCP: Felipe Pate MD     Attending:Nia Gray MD       Hospital Day: 2    Assessment and Recommendations   Landon Ordoñez is a 49 y.o. male with pmh of with a pmh of T2DM, HLD transferred from [facility] for repeat evaluation for CABG. He had presented care on 2024 with complaints of burning chest pain radiating to both arms. LHC done on 7/15/2024 revealed CAD, multiple vessel.       Plan:     Multivessel CAD, NSTEMI:  CT surgery consult appreciated.  Follow-up further testing and recommendations.  Continue heparin drip and eptifibatide as recommended by cardiologist.     T2DM with hyperglycemia:  Reports noncompliance with medications  A1c 9.8% 7/15/24.  Started on basal/prandial insulin with SSI.  DM educator consult.     Hyperlipidemia: Continue statin.      Diet ADULT DIET; Regular; 4 carb choices (60 gm/meal); Low Fat/Low Chol/High Fiber/2 gm Na  Diet NPO   DVT Prophylaxis [] Lovenox, [x]  Heparin drip, [] SCDs, [] Ambulation,  [] Eliquis, [] Xarelto  [] Coumadin   Code Status Full Code   Disposition From: Home  Expected Disposition: Home  Estimated Date of Discharge: unclear at this time  Patient requires continued admission due to MV-CAD   Surrogate Decision Maker/ Glen Beckford (Parent)  [phone]      Personally reviewed Lab Studies and Imaging     Subjective:     Chief Complaint: Multivessel CAD    Patient seen and examined.   No interval events. Denies any complaints.   No dyspnea, chest pain, fever or chills.        Review of Systems:      A 10-12 system review obtained and updated today and is unremarkable except as mentioned  in my interval history.     Objective:     Intake/Output Summary (Last 24 hours) at 2024 1150  Last data filed at 2024 1008  Gross per 24 hour 
   07/17/24 1713   Adult IBW   Height 1.778 m (5' 10\")   IBW/kg (Calculated) 73   Patient Observation   Pulse 96   Respirations 16   SpO2 100 %   Breath Sounds   Respiratory Pattern Regular   Vent Information   Ventilator Day(s) 1   Ventilator ID -[id number]   Ventilator Safety Check Performed Pre-Use Yes   Equipment Changed Airway securing device   Ventilator Initiate Yes   Vent Mode SIMV/VC   $Ventilation $Initial Day   Ventilator Settings   FiO2  45 %   Pressure Support (cm H2O) 10 cm H2O   Insp Time (sec) 0.92 sec   Vt (Set, mL) 550 mL   Resp Rate (Set) 14 bpm   PEEP/CPAP (cmH2O) 5   Vent Patient Data (Readings)   Vt (Measured) 562 mL   Peak Inspiratory Pressure (cmH2O) 19 cmH2O   Rate Measured 14 br/min   Minute Volume (L/min) 7.76 Liters   Mean Airway Pressure (cmH2O) 8.1 cmH20   Plateau Pressure (cm H2O) 16 cm H2O   Driving Pressure 11   Inspiratory Time 0.92 sec   I:E Ratio 1:3.7   Flow Sensitivity 3 L/min   Static Compliance (L/cm H2O) 51   Dynamic Compliance (L/cm H2O) 40.14 L/cm H2O   Backup Apnea On   Backup Rate 14 Breaths Per Minute   Backup Vt 550   Vent Alarm Settings   High Pressure (cmH2O) 40 cmH2O   Low Minute Volume (lpm) 3 L/min   High Minute Volume (lpm) 20 L/min   Low Exhaled Vt (ml) 250 mL   High Exhaled Vt (ml) 1000 mL   RR High (bpm) 40 br/min   Apnea (secs) 20 secs   Additional Respiratoray Assessments   Humidification Source HME   Circuit Condensation Not drained   Ambu Bag With Mask At Bedside Yes   ETT    Placement Date/Time: 07/17/24 0738   Present on Admission/Arrival: No  Placed By: In surgery  Placement Verified By: Capnometry  Preoxygenation: Yes  Mask Ventilation: Ventilated by mask with oral airway (2)  Technique: Video laryngoscopy  Airway Type...   Secured At 23 cm  (8.0 ETT, 23@ lip)   Measured From [facility]   ETT Placement Center   Secured By Commercial tube zavaleta   Site Assessment Cool;Dry   Cuff Pressure   (MOV)   Skin Assessment   Skin Assessment Clean, dry, & intact       
   07/17/24 1713   Vent Patient Data (Readings)   Vt (Measured) 562 mL   Peak Inspiratory Pressure (cmH2O) 19 cmH2O   Rate Measured 14 br/min   Minute Volume (L/min) 7.76 Liters   Mean Airway Pressure (cmH2O) 8.1 cmH20   Plateau Pressure (cm H2O) 16 cm H2O   Driving Pressure 11   Inspiratory Time 0.92 sec   I:E Ratio 1:3.7   Flow Sensitivity 3 L/min   Static Compliance (L/cm H2O) 51   Dynamic Compliance (L/cm H2O) 40.14 L/cm H2O   Backup Apnea On   Backup Rate 14 Breaths Per Minute   Backup Vt 550       
  [facility] - Inpatient Rehabilitation Department   Phone: [phone]    Occupational Therapy    [] Initial Evaluation            [x] Daily Treatment Note         [] Discharge Summary      Patient: Landon Ordoñez   : 1975   MRN: [id number]   Date of Service:  2024    Admitting Diagnosis:  NSTEMI (non-ST elevated myocardial infarction) (HCC)  Current Admission Summary: 49 y.o. male with pmh of with a pmh of T2DM, HLD transferred from [facility] for repeat evaluation for CABG. He had presented care on 2024 with complaints of burning chest pain radiating to both arms. LHC done on 7/15/2024 revealed CAD, multiple vessel. CABG 24   Past Medical History:  has a past medical history of CAD (coronary artery disease), Cardiomyopathy (HCC), and Dyslipidemia.  Past Surgical History:  has a past surgical history that includes Cardiac procedure (N/A, 7/15/2024); Cardiac procedure (N/A, 7/15/2024); and Coronary artery bypass graft (N/A, 2024).    Discharge Recommendations:Landon Ordoñez scored a 20/24 on the AM-PAC ADL Inpatient form. Current research shows that an AM-PAC score of 17 or less is typically not associated with a discharge to the patient's home setting. Based on the patient's AM-PAC score and their current ADL deficits, it is recommended that the patient have 5-7 sessions per week of Occupational Therapy at d/c to increase the patient's independence.  At this time, this patient demonstrates complex nursing, medical, and rehabilitative needs, and would benefit from intensive rehabilitation services upon discharge from the Inpatient setting.  This patient demonstrates the ability to participate in and benefit from an intensive therapy program with a coordinated interdisciplinary team approach to foster frequent, structured, and documented communication among disciplines, who will work together to establish, prioritize, and achieve treatment goals. Please see assessment section 
  [facility] - Inpatient Rehabilitation Department   Phone: [phone]    Occupational Therapy    [] Initial Evaluation            [x] Daily Treatment Note         [] Discharge Summary      Patient: Landon Ordoñez   : 1975   MRN: [id number]   Date of Service:  2024    Admitting Diagnosis:  NSTEMI (non-ST elevated myocardial infarction) (HCC)  Current Admission Summary: 49 y.o. male with pmh of with a pmh of T2DM, HLD transferred from [facility] for repeat evaluation for CABG. He had presented care on 2024 with complaints of burning chest pain radiating to both arms. LHC done on 7/15/2024 revealed CAD, multiple vessel. CABG 24   Past Medical History:  has a past medical history of CAD (coronary artery disease), Cardiomyopathy (HCC), and Dyslipidemia.  Past Surgical History:  has a past surgical history that includes Cardiac procedure (N/A, 7/15/2024); Cardiac procedure (N/A, 7/15/2024); and Coronary artery bypass graft (N/A, 2024).    Discharge Recommendations:Landon Ordoñez scored a  on the AM-PAC ADL Inpatient form. Current research shows that an AM-PAC score of 17 or less is typically not associated with a discharge to the patient's home setting. Based on the patient's AM-PAC score and their current ADL deficits, it is recommended that the patient have 5-7 sessions per week of Occupational Therapy at d/c to increase the patient's independence.  At this time, this patient demonstrates complex nursing, medical, and rehabilitative needs, and would benefit from intensive rehabilitation services upon discharge from the Inpatient setting.  This patient demonstrates the ability to participate in and benefit from an intensive therapy program with a coordinated interdisciplinary team approach to foster frequent, structured, and documented communication among disciplines, who will work together to establish, prioritize, and achieve treatment goals. Please see assessment section 
  [facility] - Inpatient Rehabilitation Department   Phone: [phone]    Occupational Therapy    [x] Initial Evaluation            [] Daily Treatment Note         [] Discharge Summary      Patient: Landon Ordoñez   : 1975   MRN: [id number]   Date of Service:  2024    Admitting Diagnosis:  NSTEMI (non-ST elevated myocardial infarction) (HCC)  Current Admission Summary: 49 y.o. male with pmh of with a pmh of T2DM, HLD transferred from [facility] for repeat evaluation for CABG. He had presented care on 2024 with complaints of burning chest pain radiating to both arms. LHC done on 7/15/2024 revealed CAD, multiple vessel. CABG 24   Past Medical History:  has a past medical history of CAD (coronary artery disease), Cardiomyopathy (HCC), and Dyslipidemia.  Past Surgical History:  has a past surgical history that includes Cardiac procedure (N/A, 7/15/2024); Cardiac procedure (N/A, 7/15/2024); and Coronary artery bypass graft (N/A, 2024).    Discharge Recommendations: Landon Ordoñez scored a  on the AM-PAC ADL Inpatient form. Current research shows that an AM-PAC score of 18 or greater is typically associated with a discharge to the patient's home setting. Based on the patient's AM-PAC score, and their current ADL deficits, it is recommended that the patient have 2-3 sessions per week of Occupational Therapy at d/c to increase the patient's independence.  At this time, this patient demonstrates the endurance and safety to discharge home with HHOT with  sup (home vs OP services) and a follow up treatment frequency of 2-3x/wk.   Please see assessment section for further patient specific details.    If patient discharges prior to next session this note will serve as a discharge summary.  Please see below for the latest assessment towards goals.    DME Required For Discharge: DME to be determined pending patient progress    Precautions/Restrictions: high fall risk  Weight Bearing 
  [facility] - Inpatient Rehabilitation Department   Phone: [phone]    Occupational Therapy    [] Initial Evaluation            [] Daily Treatment Note         [] Discharge Summary      Patient: Landon Ordoñez   : 1975   MRN: [id number]   Date of Service:  2024    Admitting Diagnosis:  NSTEMI (non-ST elevated myocardial infarction) (HCC)  Current Admission Summary:    - PT went to [facility] ED c/o CP that radiates down both arms. Trop (+). Heparin gtt started in ED.  7/15- LHC: MVD. CVTS consulted by  for eval for CABG.  made aware. Plan on CABG  CABG x4 [place], R saph vein. AV wires, not paced. [place] @ 48. Has 4 chest tubes. Extubated @ 10 pm.       Past Medical History:  has a past medical history of CAD (coronary artery disease), Cardiomyopathy (HCC), and Dyslipidemia.  Past Surgical History:  has a past surgical history that includes Cardiac procedure (N/A, 7/15/2024); Cardiac procedure (N/A, 7/15/2024); and Coronary artery bypass graft (N/A, 2024).    Discharge Recommendations: ***    DME Required For Discharge: {FFOTD/C EQUIPMENT:53399}    Precautions/Restrictions: {FFRESTRICTIONS:23963}  Weight Bearing Restrictions: {FFWBRESTRICTIONS:46786}  [] Right Upper Extremity  [] Left Upper Extremity [] Right Lower Extremity  [] Left Lower Extremity     Required Braces/Orthotics: {ffbraces:38301}   [] Right  [] Left  Positional Restrictions:{ffpositionrestrictions:54026}    Pre-Admission Information   Lives With: {ffliveswith:75485}    Type of Home: {fftypeofhome:25015}  Home Layout: {ffhomelayout:77217}  Home Access: {ffhomeaccess:79298}  Bathroom Layout: {ffbathroomlayout:30018}  Bathroom Equipment: {ffbathroomequipment:43914}  Toilet Height: {fftoiletheight:68221}  Home Equipment: {ffplofequipment:59262}  Transfer Assistance: {FFPLOFAssistance:17789}  Ambulation Assistance:{FFPLOFAssistance:00281}  ADL Assistance: {ffplofadl:15541}  IADL Assistance: 
  [facility] - Inpatient Rehabilitation Department   Phone: [phone]    Physical Therapy    [] Initial Evaluation            [x] Daily Treatment Note         [] Discharge Summary      Patient: Landon Ordoñez   : 1975   MRN: [id number]   Date of Service:  2024  Admitting Diagnosis: NSTEMI (non-ST elevated myocardial infarction) (HCC)  Current Admission Summary: Landon Ordoñez is a 49 y.o. male with pmh of with a pmh of T2DM, HLD transferred from [facility] for repeat evaluation for CABG. He had presented care on 2024 with complaints of burning chest pain radiating to both arms. LHC done on 7/15/2024 revealed CAD, multiple vessel. CABG 24  Past Medical History:  has a past medical history of CAD (coronary artery disease), Cardiomyopathy (HCC), and Dyslipidemia.  Past Surgical History:  has a past surgical history that includes Cardiac procedure (N/A, 7/15/2024); Cardiac procedure (N/A, 7/15/2024); and Coronary artery bypass graft (N/A, 2024).    Discharge Recommendations: Landon Ordoñez scored a 20/24 on the AM-PAC short mobility form. Current research shows that an AM-PAC score of 18 or greater is typically associated with a discharge to the patient's home setting. Based on the patient's AM-PAC score and their current functional mobility deficits, it is recommended that the patient have 2-3 sessions per week of Physical Therapy at d/c to increase the patient's independence.  At this time, this patient demonstrates the endurance and safety to discharge home with  (home services) and a follow up treatment frequency of 2-3x/wk.  Please see assessment section for further patient specific details.  HOME HEALTH CARE: LEVEL 1 STANDARD    - Initial home health evaluation to occur within 24-48 hours, in patient home   - Therapy to evaluate with goal of regaining prior level of functioning   - Therapy to evaluate if patient has Home Health Aide needs for personal care    If patient 
  [facility] - Inpatient Rehabilitation Department   Phone: [phone]    Physical Therapy    [x] Initial Evaluation            [] Daily Treatment Note         [] Discharge Summary      Patient: Landon Ordoñez   : 1975   MRN: [id number]   Date of Service:  2024  Admitting Diagnosis: NSTEMI (non-ST elevated myocardial infarction) (HCC)  Current Admission Summary: Landon Ordoñez is a 49 y.o. male with pmh of with a pmh of T2DM, HLD transferred from [facility] for repeat evaluation for CABG. He had presented care on 2024 with complaints of burning chest pain radiating to both arms. LHC done on 7/15/2024 revealed CAD, multiple vessel. CABG 24  Past Medical History:  has a past medical history of CAD (coronary artery disease), Cardiomyopathy (HCC), and Dyslipidemia.  Past Surgical History:  has a past surgical history that includes Cardiac procedure (N/A, 7/15/2024); Cardiac procedure (N/A, 7/15/2024); and Coronary artery bypass graft (N/A, 2024).    Discharge Recommendations: Landon Ordoñez scored a  on the AM-PAC short mobility form. Current research shows that an AM-PAC score of 18 or greater is typically associated with a discharge to the patient's home setting. Based on the patient's AM-PAC score and their current functional mobility deficits, it is recommended that the patient have 2-3 sessions per week of Physical Therapy at d/c to increase the patient's independence.  At this time, this patient demonstrates the endurance and safety to discharge home with HHPT and a follow up treatment frequency of 2-3x/wk.  Please see assessment section for further patient specific details. Anticipate that pt will progress with mobility and be able to return home at discharge. Pt is very tired POD 1. Will continue to assess for discharge needs.     If patient discharges prior to next session this note will serve as a discharge summary.  Please see below for the latest assessment towards 
  Physician Progress Note      PATIENT:               ROWDY VALENTINO  [facility] #:                  [id number]  :                       1975  ADMIT DATE:       7/15/2024 1:44 PM  DISCH DATE:        2024 5:44 PM  RESPONDING  PROVIDER #:        FELIPE KRAMER - CNP          QUERY TEXT:    Pt admitted with NSTEMI and underwent CABG.  If possible, please document in   the progress notes and discharge summary if you are evaluating and/or treating   any of the following:    The medical record reflects the following:  Risk Factors: s/p CABG    Clinical Indicators:  Per  4:40 PM nurse notes- O2 saturation dropping to 83% on 5L nasal   cannula. Placed on 8L high flow nasal cannula. O2 saturation now 91%.   6:38 PM nurse notes-  O2Sats 93% on 15L high flow nasal cannula.   8:32 PM nurse notes- He is on 12L NF NC SpO2 92%. Patient educated and   encouraged to cough and deep breath and use IS.   6:40 PM nurse notes- SpO2 94% on 8L HFNC. He has been using his IS and   acapella.   PN- Up in chair, cxr this morning appears worse. He needs to work at   pulmonary toileting and ambulation. We have added breathing treatments as well   as BiPap QHS and during naps.   ABG- pCO2- 34.1, pH- 7.522, pO2-53.7    Treatment: supplemental O2, IS, acapella, breathing tx's, bipap, ABG  Options provided:  -- Acute pulmonary insufficiency following cardiothoracic surgery  -- Other - I will add my own diagnosis  -- Disagree - Not applicable / Not valid  -- Disagree - Clinically unable to determine / Unknown  -- Refer to Clinical Documentation Reviewer    PROVIDER RESPONSE TEXT:    This patient has acute postoperative pulmonary insufficiency following   cardiothoracic surgery    Query created by: Kristi Norris on 2024 7:18 AM      Electronically signed by:  FELIPE KRAMER - CNP 2024 9:31 AM          
  [facility] - Inpatient Rehabilitation Department   Phone: [phone]    Physical Therapy    [] Initial Evaluation            [x] Daily Treatment Note         [] Discharge Summary      Patient: Landon Ordoñez   : 1975   MRN: [id number]   Date of Service:  2024  Admitting Diagnosis: NSTEMI (non-ST elevated myocardial infarction) (HCC)  Current Admission Summary: Landon Ordoñez is a 49 y.o. male with pmh of with a pmh of T2DM, HLD transferred from [facility] for repeat evaluation for CABG. He had presented care on 2024 with complaints of burning chest pain radiating to both arms. LHC done on 7/15/2024 revealed CAD, multiple vessel. CABG 24  Past Medical History:  has a past medical history of CAD (coronary artery disease), Cardiomyopathy (HCC), and Dyslipidemia.  Past Surgical History:  has a past surgical history that includes Cardiac procedure (N/A, 7/15/2024); Cardiac procedure (N/A, 7/15/2024); and Coronary artery bypass graft (N/A, 2024).    Discharge Recommendations: Landon Ordoñez scored a 18/24 on the AM-PAC short mobility form. Current research shows that an AM-PAC score of 18 or greater is typically associated with a discharge to the patient's home setting. Based on the patient's AM-PAC score and their current functional mobility deficits, it is recommended that the patient have 2-3 sessions per week of Physical Therapy at d/c to increase the patient's independence.  At this time, this patient demonstrates the endurance and safety to discharge home with  (home services) and a follow up treatment frequency of 2-3x/wk.  Please see assessment section for further patient specific details.  HOME HEALTH CARE: LEVEL 1 STANDARD    - Initial home health evaluation to occur within 24-48 hours, in patient home   - Therapy to evaluate with goal of regaining prior level of functioning   - Therapy to evaluate if patient has Home Health Aide needs for personal care    If patient 
 Chest tubes meet criteria to remove per open heart protocol.   No  air leak.  no crepitus.  Pt instructed on procedure. Site cleansed and prepped per protocol.  Chest tubes X 1 removed without difficulty.  Dry sterile dressing applied.  Bilateral breath sounds audible.  O2 Sats 96 on 2L nasal cannula. AV  wires secured to dressing.   Incision site within normal limits. Patient tolerated well.    
 Latest Reference Range & Units 07/16/24 20:31   aPTT 22.1 - 36.4 sec 75.9 (H)   (H): Data is abnormally high    Per order no bolus and no change in rate. Will continue to provide care.   
 Latest Reference Range & Units 07/17/24 00:37   aPTT 22.1 - 36.4 sec 80.2 (H)   (H): Data is abnormally high    No bolus and or rate change. Will continue to provide care.   
Bed Side Spirometry Complete. Results placed in chart and copy sent to PFT Lab  
CLINICAL PHARMACY NOTE: MEDS TO BEDS    Total # of Prescriptions Filled:  16   The following medications were delivered to the patient:  Pen needles  Freestyle emily 3 sensors (picked up by diabetic educator GEOVANNI Arzate)  Clopidogrel 75mg  Furosemide 40mg  Lantus solostar  Alcohol pads  Atorvastatin 80mg  Metoprolol succinate ER 25mg  Potassium cl ER 20meq  Pantoprazole 40mg  Humalog kwikpen  Aspirin 81mg  Colchicine 0.6mg  Tramadol 60mg  Miralax powder  Metformin 500mg    Additional Documentation:     GEOVANNI Erazo approved medication delivery    Medications were delivered to patient's room and patient signed for    Leslie Girard CPhT   
Chest tubes meet criteria to remove per open heart protocol.   No air leak and no crepitus noted.  Pt instructed on procedure. Dressings removed.  Incision within normal limits.  Site cleansed and prepped per protocol. Right pleural chest tubes X 1 removed without difficulty.  Vaseline gauze and dry sterile dressing applied.  Bilateral breath sounds audible.  O2Sats 97% on 4L.  A/V wires secured to dressing.  Patient tolerated well.        
Chest tubes meet criteria to remove per open heart protocol.  No air leak and no crepitus noted.  Pt instructed on procedure.  Dressings removed.  Incision within normal limits.  Site cleansed and prepped per protocol.  Mediastinal chest tubes X 2 removed without difficulty.  Vaseline gauze and dry sterile dressing applied.  Bilateral breath sounds audible.  O2Sats 93% on 15L high flow nasal cannula.  A+V wires secured to dressing.  Patient tolerated well.            
Discussed with CT surgery, they will continue to manage patient as primary team. Cardiology will sign off.   
Endocrinology Progress    Following at a distance, patient is post CABG and doing well except for apparent blood loss anemia and persistent dyspnea.  He was up to the bathroom today and has had a bowel movement.  After 2 units packed RBC's his hemoglobin is up to 9.7 g/dl and he says he feels stronger after the transfusion.  Blood sugars are 203 to 255 mg/dl.    Pulse 88 Resp 18; Temp 98.8 deg F, /70  HEENT: EOM's intact  Neck: No goiter  Chest: One chest tube remaining, serosanguinous drainage considerable but none in tubing presently.  Abd: Nontender  Ext: Diffuse edema below knees, 2+ to 3+    Assessment:  Type 2 DM with complication, CAD post CABG, probably had a minor MI age indeterminate before CABG.  Although large arteries may be open post CABG, microvascular does not respond immediately; hence patient may still have signs or symptoms of angina and perhaps even mild CHF temporarily post op.    Hyperglycemia, blood sugars somewhat higher than optimal but typical for post op course after major surgery; patient is reluctant about diabetes therapy but certainly it is needed now.   Rhythm strips show NSR or minor tachycardia.  Respiratory insufficiency: ABG today shows pH 7.522, CCO2 34.1, PO2 53.7 and 91.8% arterial oxygen saturation on I believe 3 liters/min nasal canula oxygen.  Chest x ray today shows slight increase in right pleural effusion, unchanged left pleural effusion.  Hyperlipidemia on atorvastatin.    Plan:  Increase basal Lantus from 15 units to 18 units twice daily.  Increase mealtime Humalog from 7 units to 9 units.  Continue atorvastatin  Is it possible to decrease the intensity of anticoagulation?  Patient currently on clopidogrel 75 mg and Lovenox 40 mg daily.  5. Follow fingerstick blood sugars.    ILEANA Bauman M.D.    
Following Labs was relayed to Dr. Conti and telephone ordered to do type and screen, transfuse 2 units of PRCB then give lasix 40mg after administration of first unit.     Latest Reference Range & Units 07/21/24 06:15   RBC 4.20 - 5.90 M/uL 2.36 (L)   Hemoglobin Quant 13.5 - 17.5 g/dL 7.0 (L)   Hematocrit 40.5 - 52.5 % 20.6 (LL)     
Incentive Spirometry education and demonstration completed by Respiratory Therapy Yes      Response to education: Excellent     Teaching Time: 5 minutes    Minimum Predicted Vital Capacity - 730 mL.  Patient's Actual Vital Capacity - 1500 mL. Turning over to Nursing for routine follow-up Yes.        Electronically signed by Ivonne Jade RCP on 7/16/2024 at 12:56 PM  
[facility]  Diabetes Education   Progress Note       NAME:  Landon Ordoñez  MEDICAL RECORD NUMBER:  [id number]  AGE: 49 y.o.   GENDER: male  : 1975  TODAY'S DATE:  2024    Subjective   Reason for Diabetes Education Evaluation and Assessment: DM    Visit Type: follow-up      Landon Ordoñez is a 49 y.o. male referred by:  [x] Physician  [] Nursing  [] Chart Review   [] Other:     Followed up with pt for DM education. Discussed current med regimen. Encouraged follow up with endocrinology; pt reports issues with transportation.    PAST MEDICAL HISTORY        Diagnosis Date    CAD (coronary artery disease) 07/15/2024    Cardiomyopathy (HCC) 07/15/2024    Dyslipidemia 07/15/2024       PAST SURGICAL HISTORY    Past Surgical History:   Procedure Laterality Date    CARDIAC PROCEDURE N/A 7/15/2024    Left heart cath / coronary angiography performed by Geovanna Jose DO at [facility] CARDIAC CATH LAB    CARDIAC PROCEDURE N/A 7/15/2024    Percutaneous coronary intervention performed by Geovanna Jose DO at [facility] CARDIAC CATH LAB    CORONARY ARTERY BYPASS GRAFT N/A 2024    CABG X 4 (LIMA TO LAD, SVG TO OM2, SVG TO PDA, SVG TO DIAG), TCPB, AYSHA, DOPPLER FLOW VERIFICATION OF BYPASS GRAFTS WITH MEDISTIM PROBE 3.0, EVH OF RIGHT SAPHENOUS VEIN, INSERTION OF TEMPORARY ATRIAL AND VENTRICULAR PACING WIRES performed by Carmela Conti MD at [facility] CVOR       FAMILY HISTORY    Family History   Problem Relation Age of Onset    Heart Disease Mother     Diabetes Mother     Heart Disease Father     Diabetes Father        SOCIAL HISTORY    Social History     Tobacco Use    Smoking status: Never   Vaping Use    Vaping Use: Never used   Substance Use Topics    Alcohol use: Yes     Comment: occ    Drug use: Never       ALLERGIES    No Known Allergies    MEDICATIONS     insulin lispro  5 Units SubCUTAneous TID WC    furosemide  40 mg Oral BID    metFORMIN  500 mg Oral BID WC    insulin glargine  18 Units 
Nutrition Education    Attempted [facility] diet education.  Brief [facility] edu reviewed. pt did not express much motivation as he was browsing phone & accepting outside calls.   Learners: Patient  Readiness: Non-acceptance  Method: Explanation and Handout  Response: Needs Reinforcement and No Evidence of Learning  Contact name and number provided.    Argelia Arriaza RD, LD  Contact Number: [phone]    
Nutrition Note    RECOMMENDATIONS  PO Diet: Start 5 carb choice, cardiac, 1500 mL fluid restricted diet as appropriate  ONS: If po intakes of meals are consistently <50%, please order Ensure HP BID      NUTRITION ASSESSMENT   Pt is s/p CABG x4 yesterday. Pt reported no issues with appetite or po intake prior to current admission. No significant wt change noted in wt hx in EMR. RD will monitor for adequate po intake once diet is resumed vs need for ONS and will provide cardiac diet education prior to discharge.     Nutrition Related Findings: Labs reviewed. Insulin gtt, POCG . LBM pta. Non-pitting generalized edema.  Wounds: Multiple, Surgical Incision  Nutrition Education:  Education needed   Nutrition Goals: PO intake 50% or greater, by next RD assessment     MALNUTRITION ASSESSMENT   Malnutrition Status: No malnutrition    NUTRITION DIAGNOSIS   Increased nutrient needs related to increase demand for energy/nutrients as evidenced by wounds (s/p OHS)    CURRENT NUTRITION THERAPIES  Diet NPO  ADULT DIET; Regular; Low Fat/Low Chol/High Fiber/2 gm Na; 1500 ml     PO Intake: NPO   PO Supplement Intake:NPO    ANTHROPOMETRICS  Current Height: 177.8 cm (5' 10\")  Current Weight - Scale: 86 kg (189 lb 9.5 oz)    Admission weight: 79.3 kg (174 lb 13.2 oz)  Ideal Body Weight (IBW): 166 lbs  (75 kg)       BMI: 27.2    COMPARATIVE STANDARDS  Total Energy Requirements (kcals/day): 1716-2469     Protein (g):         Fluid (mL/day):  8411-5330    The patient will be monitored per nutrition standards of care. Consult dietitian if additional nutrition interventions are needed prior to RD reassessment.     Marisol Nieves MS, RD, LD    Contact: [phone]   
O2 saturation dropping to 83% on 5L nasal cannula. Placed on 8L high flow nasal cannula. O2 saturation now 91%.  
Open heart discharge instructions reviewed thoroughly with patient and their family.  All medications reviewed and paperwork signed.  Prescriptions given to patient.  All questions regarding discharge instructions and medications answered.  Home health care set up with [facility].  All paperwork faxed to home health care agency and the surgeon's office.  Open heart binder given to patient. Pt d/c via wheelchair with family.   
Order received for arterial line removal.  Right radial arterial line discontinued.  Manual pressure held for 5 minutes and tegaderm on site.  No hematoma, no oozing noted.  Patient tolerated well.       
[facility]  Diabetes Education   Progress Note       NAME:  Landon Ordoñez  MEDICAL RECORD NUMBER:  [id number]  AGE: 49 y.o.   GENDER: male  : 1975  TODAY'S DATE:  2024    Subjective   Reason for Diabetes Education Evaluation and Assessment: Diabetes, has refused all medications in the past per last PCP office note, hemoglobin A1C 9.8%.    Visit Type: evaluation      Landon Ordoñez is a 49 y.o. male referred by:  [x] Physician  [] Nursing  [] Chart Review   [] Other:     PAST MEDICAL HISTORY        Diagnosis Date    CAD (coronary artery disease) 07/15/2024    Cardiomyopathy (HCC) 07/15/2024    Dyslipidemia 07/15/2024       PAST SURGICAL HISTORY    Past Surgical History:   Procedure Laterality Date    CARDIAC PROCEDURE N/A 7/15/2024    Left heart cath / coronary angiography performed by Geovanna Jose DO at [facility] CARDIAC CATH LAB    CARDIAC PROCEDURE N/A 7/15/2024    Percutaneous coronary intervention performed by Geovanna Jose DO at [facility] CARDIAC CATH LAB    CORONARY ARTERY BYPASS GRAFT N/A 2024    CABG X 4 (LIMA TO LAD, SVG TO OM2, SVG TO PDA, SVG TO DIAG), TCPB, AYSHA, DOPPLER FLOW VERIFICATION OF BYPASS GRAFTS WITH MEDISTIM PROBE 3.0, EVH OF RIGHT SAPHENOUS VEIN, INSERTION OF TEMPORARY ATRIAL AND VENTRICULAR PACING WIRES performed by Carmela Conti MD at [facility] CVOR       FAMILY HISTORY    Family History   Problem Relation Age of Onset    Heart Disease Mother     Diabetes Mother     Heart Disease Father     Diabetes Father        SOCIAL HISTORY    Social History     Tobacco Use    Smoking status: Never   Vaping Use    Vaping Use: Never used   Substance Use Topics    Alcohol use: Yes     Comment: occ    Drug use: Never       ALLERGIES    No Known Allergies    MEDICATIONS     furosemide  20 mg IntraVENous q8h    potassium chloride  20 mEq Oral q8h    [START ON 2024] metoprolol tartrate  12.5 mg Oral q8h    insulin glargine  10 Units SubCUTAneous BID    insulin 
Patient VSS, he received 2 units of PRBC today and 40mg IV push lasix in between. He has been using his IS and acapella. He ambulated the whole unit X2 with this RN. SpO2 94% on 8L HFNC. Safety precautions in place.    Electronically signed by Tenisha Cuellar RN on 7/21/2024 at 6:42 PM    
Patient admitted to CVU from CVOR and attached to ventilator and monitors.  Report received from anesthesiologist.  Chest x-ray ordered.  Labs drawn and sent.  Assessment complete.  Continue monitoring hemodynamics.  Family let back to see patient.  Visiting hours reviewed and all questions answered. Family aware of discharge class. Primary RN Anya.    RECOVERY PERIOD BEGINS  [id number]  
Patient awake and following commands.  Patient placed on CPAP per open heart protocol.  ABG drawn 20 minutes later and WNL.  Respiratory called for weaning parameters.  NIF - 20.  Patient suctioned and extubated to 4 liters nasal cannula  OG tube discontinued.   Patient tolerated well.  Oxygen saturation 100.   Patient alert and oriented X 3.       Recovery period ends at 22:00.   
Patient met criteria per open heart protocol to transition to stepdown level of care. Procedures explained to patient.   Right IJ [place] Abbe discontinued and obturator inserted.  Patient tolerated well and minimal ectopy on monitor. Patient tolerated well.       
Patient receiving a unit of blood at this time with no complications. Consent verified. He is on 12L NF NC SpO2 92%. Patient educated and encouraged to cough and deep breath and use IS.     Electronically signed by Tenisha Cuellar RN on 7/21/2024 at 8:33 AM    
Patient transported to pre op.   
Performed leak test and nif.  Pt did well.  Extubated pt and placed on 4l/m nasal cannula.  Pt resting comfortably.  
[facility]  Diabetes Education   Progress Note       NAME:  Landon Ordoñez  MEDICAL RECORD NUMBER:  [id number]  AGE: 49 y.o.   GENDER: male  : 1975  TODAY'S DATE:  2024    Followed up with pt.    Endocrinologist at pt's preferred location is not accepting new patients. Informed pt and offered to make hospital follow up appointment with PCP. Pt agreeable. Appt made for 24 at 9 am with Zuleika Glez NP at Dr. Pate's office. Informed pt of appointment and advised to call office by Friday if he needs to reschedule. Pt v/u.    Reviewed basal bolus insulin regimen which has been prescribed for discharge. Provided handout with dosages. Pt v/u.    Reviewed use and storage of insulin pens. Provided handout and demonstration. Pt v/u, declined practice.    Assisted pt with applying new Freestyle Marsha 3 sensor and connecting it to wood on pt's phone. 60 minute sensor warm up in process. Advised pt to review BG levels with PCP at follow up appointments.    Reviewed hypoglycemia s/s and treatment. Pt v/u.    Pt denies other questions/concerns r/t DM at this time. Requesting to have IV's removed. Notified primary nurse.     Teaching Time Diabetes Education:  20 minutes     Electronically signed by Carito Wiseman RN [facility] on 2024 at 2:41 PM    
Pt admitted via ambulance stretcher from [facility]. Pt stood to get in bed from stretcher. Placed on CVU monitoring. VSS. Pt on heparin and integrillin gtts upon admission. Perfect serve sent to notify hospitalist for arrival and orders.   
Pt unable to tolerate BIPAP, but is willing to try CPAP. Placed on +5 40%. Seems to be tolerating well. Will monitor.  
Shift: 7/17 7p-7a    Procedure: CABG x 4 LIMA, LSVG    Admit from OR (time and date): 7/17/2024 1715    Transition (time and date): n/a    Surgery, return to OR no     Nursing assessment at handoff  stable    Most recent vitals: /81   Pulse (!) 118   Temp (!) 101.4 °F (38.6 °C) (Bladder)   Resp 22   Ht 1.778 m (5' 10\")   Wt 78.3 kg (172 lb 9.9 oz)   SpO2 98%   BMI 24.77 kg/m²      Increased O2 requirements: no O2 requirements: Oxygen Therapy  SpO2: 98 %  Pulse Oximetry Type: Intermittent  Pulse Oximeter Device Mode: Continuous  Pulse Oximeter Device Location: Left, Hand  O2 Device: Nasal cannula  Skin Assessment: Clean, dry, & intact  Skin Protection for O2 Device: No  FiO2 : 45 %  O2 Flow Rate (L/min): 4 L/min  Blood Gas  Performed?: Yes  Fam's Test #1: Collateral flow confirmed  Site #1: Left Radial  Site Prepped #1: Yes  Number of Attempts #1: 1  Pressure Held #1: Yes  Complications #1: None  Post-procedure #1: Standard  Specimen Status #1: To lab  How Tolerated?: Tolerated well  Oxygen Therapy: None (Room air)  Vent Mode: CPAP/PS     Admission weight Weight - Scale: 79.3 kg (174 lb 13.2 oz)  Today's weight   Wt Readings from Last 1 Encounters:   07/17/24 78.3 kg (172 lb 9.9 oz)         EF: 35%    Drop in Urinary Output no     Rhythm Changes Sinus tach     Pacing Wires Removed:  [] Yes  [x] NO  [] Platelets < 50,000  [] Arrhythmia  [] Bradycardia [] Valve Replacement  [] Pacing for Cardiac Index  []Physician Order    Lines/Drains  LDA Insertion Date Discontinued Date Dressing Changes   Art line 7/17     Central Line 7/17     Ortiz 7/17     Chest Tube 7/17     Wires  7/17     ETT 7/17     KURTIS Drain      VasCath      Impella        Interventions After Office Hours  Problem(Brief) Date Time Intervention Physician contacted                                               Drip rates at handoff:    lactated ringers IV soln 50 mL/hr at 07/17/24 2300    sodium chloride      DOBUTamine      milrinone 0.25 
Shift: 7a-7p    Procedure: CABG x 4 LIMA, LSVG    Admit from OR (time and date): 7/17/2024 1715    Transition (time and date): n/a    Surgery, return to OR no     Nursing assessment at handoff  stable    Most recent vitals: /81   Pulse 99   Temp 97.9 °F (36.6 °C) (Bladder)   Resp 14   Ht 1.778 m (5' 10\")   Wt 78.3 kg (172 lb 9.9 oz)   SpO2 100%   BMI 24.77 kg/m²      Increased O2 requirements: no O2 requirements: Oxygen Therapy  SpO2: 100 %  Pulse Oximetry Type: Intermittent  Pulse Oximeter Device Mode: Continuous  Pulse Oximeter Device Location: Left, Hand  O2 Device: Ventilator  Skin Assessment: Clean, dry, & intact  Skin Protection for O2 Device: No  FiO2 : 45 %  O2 Flow Rate (L/min): 65 L/min  Blood Gas  Performed?: Yes  Fam's Test #1: Collateral flow confirmed  Site #1: Left Radial  Site Prepped #1: Yes  Number of Attempts #1: 1  Pressure Held #1: Yes  Complications #1: None  Post-procedure #1: Standard  Specimen Status #1: To lab  How Tolerated?: Tolerated well  Oxygen Therapy: None (Room air)  Vent Mode: SIMV/VC     Admission weight Weight - Scale: 79.3 kg (174 lb 13.2 oz)  Today's weight   Wt Readings from Last 1 Encounters:   07/17/24 78.3 kg (172 lb 9.9 oz)         EF: 35%    Drop in Urinary Output no     Rhythm Changes Sinus tach     Pacing Wires Removed:  [] Yes  [] NO  [] Platelets < 50,000  [] Arrhythmia  [] Bradycardia [] Valve Replacement  [] Pacing for Cardiac Index  []Physician Order    Lines/Drains  LDA Insertion Date Discontinued Date Dressing Changes   Art line 7/17     Central Line 7/17     Ortiz 7/17     Chest Tube 7/17     Wires  7/17     ETT 7/17     KURTIS Drain      VasCath      Impella        Interventions After Office Hours  Problem(Brief) Date Time Intervention Physician contacted                                               Drip rates at handoff:    lactated ringers IV soln 50 mL/hr at 07/17/24 1946    sodium chloride      DOBUTamine      milrinone 0.25 mcg/kg/min (07/17/24 
Shift: POD #1 7p-7a    Procedure: CABG X 4 (LIMA TO LAD, SVG TO OM2, SVG TO PDA, SVG TO DIAG), EVH OF RIGHT SAPHENOUS VEIN - with Dr. Conti    Admit from OR (time and date): 7/17/2024 1715    Transition (time and date): 7/18 @ 1520    Surgery, return to OR no     Nursing assessment at handoff  stable    Most recent vitals: /72   Pulse 99   Temp 100 °F (37.8 °C) (Bladder)   Resp 20   Ht 1.778 m (5' 10\")   Wt 86 kg (189 lb 9.5 oz)   SpO2 94%   BMI 27.20 kg/m²      Increased O2 requirements: no  (3L)    O2 requirements: Oxygen Therapy  SpO2: 94 %  Pulse Oximetry Type: Intermittent  Pulse Oximeter Device Mode: Continuous  Pulse Oximeter Device Location: Finger  O2 Device: Nasal cannula  Skin Assessment: Clean, dry, & intact  Skin Protection for O2 Device: No  FiO2 : 45 %  O2 Flow Rate (L/min): 3 L/min  Blood Gas  Performed?: Yes  Fam's Test #1: Collateral flow confirmed  Site #1: Left Radial  Site Prepped #1: Yes  Number of Attempts #1: 1  Pressure Held #1: Yes  Complications #1: None  Post-procedure #1: Standard  Specimen Status #1: To lab  How Tolerated?: Tolerated well  Oxygen Therapy: None (Room air)  Vent Mode: CPAP/PS     Admission weight Weight - Scale: 79.3 kg (174 lb 13.2 oz)  Today's weight   Wt Readings from Last 1 Encounters:   07/18/24 86 kg (189 lb 9.5 oz)         EF: 35%    Drop in Urinary Output no     Rhythm Changes: no, SR/ST    Pacing Wires Removed:  [] Yes  [x] NO  [] Platelets < 50,000  [] Arrhythmia  [] Bradycardia [] Valve Replacement  [] Pacing for Cardiac Index  []Physician Order    Lines/Drains  LDA Insertion Date Discontinued Date Dressing Changes   Art line 7/17 7/18    Central Line 7/17     Ortiz 7/17     Chest Tube 7/17 R pleural 7/18    Wires  7/17     ETT 7/17 7/18    [place] 7/17 7/18    VasCath      Impella        Interventions After Office Hours  Problem(Brief) Date Time Intervention Physician contacted                                               Drip rates at handoff: 
Shift: POD #3 7a-7p    Procedure: CABG X 4 (LIMA TO LAD, SVG TO OM2, SVG TO PDA, SVG TO DIAG), EVH OF RIGHT SAPHENOUS VEIN - with Dr. Conti    Admit from OR (time and date): 7/17/2024 1715    Transition (time and date): 7/18 @ 1520    Surgery, return to OR no     Nursing assessment at handoff  stable    Most recent vitals: /77   Pulse (!) 104   Temp 99.3 °F (37.4 °C) (Temporal)   Resp 18   Ht 1.778 m (5' 10\")   Wt 87.4 kg (192 lb 10.9 oz)   SpO2 95%   BMI 27.65 kg/m²      Increased O2 requirements: no  (3L)    O2 requirements: Oxygen Therapy  SpO2: 95 %  Pulse Oximetry Type: Continuous  Pulse Oximeter Device Mode: Continuous  Pulse Oximeter Device Location: Finger  O2 Device: High flow nasal cannula  Skin Assessment: Clean, dry, & intact  Skin Protection for O2 Device: No  FiO2 : 45 %  O2 Flow Rate (L/min): 15 L/min  Blood Gas  Performed?: Yes  Fam's Test #1: Collateral flow confirmed  Site #1: Left Radial  Site Prepped #1: Yes  Number of Attempts #1: 1  Pressure Held #1: Yes  Complications #1: None  Post-procedure #1: Standard  Specimen Status #1: To lab  How Tolerated?: Tolerated well  Oxygen Therapy: None (Room air)  Vent Mode: CPAP/PS     Admission weight Weight - Scale: 79.3 kg (174 lb 13.2 oz)  Today's weight   Wt Readings from Last 1 Encounters:   07/19/24 87.4 kg (192 lb 10.9 oz)         EF: 35%    Drop in Urinary Output no     Rhythm Changes: no, SR/ST    Pacing Wires Removed:  [] Yes  [x] NO  [] Platelets < 50,000  [] Arrhythmia  [] Bradycardia [] Valve Replacement  [] Pacing for Cardiac Index  []Physician Order    Lines/Drains  LDA Insertion Date Discontinued Date Dressing Changes   Art line 7/17 7/18    Central Line 7/17     Ortiz 7/17     Chest Tube 7/17 R pleural 7/18  Mediastinal 7/19    Wires  7/17     ETT 7/17 7/18    [place] 7/17 7/18    VasCath      Impella        Interventions After Office Hours  Problem(Brief) Date Time Intervention Physician contacted                             
Shift: POD #3 7p-7a    Procedure: CABG X 4 (LIMA TO LAD, SVG TO OM2, SVG TO PDA, SVG TO DIAG), EVH OF RIGHT SAPHENOUS VEIN - with Dr. Conit    Admit from OR (time and date): 7/17/2024 1715    Transition (time and date): 7/18 @ 1520    Surgery, return to OR no     Nursing assessment at handoff  stable    Most recent vitals: /73   Pulse 93   Temp 98.1 °F (36.7 °C) (Temporal)   Resp 18   Ht 1.778 m (5' 10\")   Wt 87.4 kg (192 lb 10.9 oz)   SpO2 95%   BMI 27.65 kg/m²      Increased O2 requirements: no  (3L)    O2 requirements: Oxygen Therapy  SpO2: 95 %  Pulse Oximetry Type: Continuous  Pulse Oximeter Device Mode: Continuous  Pulse Oximeter Device Location: Finger  O2 Device: High flow nasal cannula  Oximetry Probe Site Changed: Yes  Skin Assessment: Clean, dry, & intact  Skin Protection for O2 Device: No  FiO2 : 45 %  O2 Flow Rate (L/min): 10 L/min  Blood Gas  Performed?: Yes  Fam's Test #1: Collateral flow confirmed  Site #1: Left Radial  Site Prepped #1: Yes  Number of Attempts #1: 1  Pressure Held #1: Yes  Complications #1: None  Post-procedure #1: Standard  Specimen Status #1: To lab  How Tolerated?: Tolerated well  Oxygen Therapy: None (Room air)  Vent Mode: CPAP/PS     Admission weight Weight - Scale: 79.3 kg (174 lb 13.2 oz)  Today's weight   Wt Readings from Last 1 Encounters:   07/19/24 87.4 kg (192 lb 10.9 oz)         EF: 35%    Drop in Urinary Output no     Rhythm Changes: no, SR/ST    Pacing Wires Removed:  [] Yes  [x] NO  [] Platelets < 50,000  [] Arrhythmia  [] Bradycardia [] Valve Replacement  [] Pacing for Cardiac Index  []Physician Order    Lines/Drains  LDA Insertion Date Discontinued Date Dressing Changes   Art line 7/17 7/18    Central Line 7/17     Ortiz 7/17     Chest Tube 7/17 R pleural 7/18  Mediastinal 7/19    Wires  7/17     ETT 7/17 7/18    [place] 7/17 7/18    VasCath      Impella        Interventions After Office Hours  Problem(Brief) Date Time Intervention Physician contacted 
Shift: POD #5 7p-7a    Procedure: CABG X 4 (LIMA TO LAD, SVG TO OM2, SVG TO PDA, SVG TO DIAG), EVH OF RIGHT SAPHENOUS VEIN - with Dr. Conti    Admit from OR (time and date): 7/17/2024 1715    Transition (time and date): 7/18 @ 1520    Surgery, return to OR no     Nursing assessment at handoff  stable    Most recent vitals: /68   Pulse 98   Temp 98.2 °F (36.8 °C) (Temporal)   Resp 18   Ht 1.778 m (5' 10\")   Wt 87.6 kg (193 lb 3.7 oz)   SpO2 92%   BMI 27.73 kg/m²      Increased O2 requirements: no  (3L)    O2 requirements: Oxygen Therapy  SpO2: 92 %  Pulse Oximetry Type: Continuous  Pulse Oximeter Device Mode: Continuous  Pulse Oximeter Device Location: Finger  O2 Device: High flow nasal cannula  Oximetry Probe Site Changed: Yes  Skin Assessment: Clean, dry, & intact  Skin Protection for O2 Device: No  FiO2 : 45 %  O2 Flow Rate (L/min): 14 L/min  Blood Gas  Performed?: Yes  Fam's Test #1: Collateral flow confirmed  Site #1: Left Radial  Site Prepped #1: Yes  Number of Attempts #1: 1  Pressure Held #1: Yes  Complications #1: None  Post-procedure #1: Standard  Specimen Status #1: To lab  How Tolerated?: Tolerated well  Oxygen Therapy: None (Room air)  Vent Mode: CPAP/PS     Admission weight Weight - Scale: 79.3 kg (174 lb 13.2 oz)  Today's weight   Wt Readings from Last 1 Encounters:   07/20/24 87.6 kg (193 lb 3.7 oz)         EF: 35%    Drop in Urinary Output no     Rhythm Changes: no, SR/ST    Pacing Wires Removed:  [] Yes  [x] NO  [] Platelets < 50,000  [] Arrhythmia  [] Bradycardia [] Valve Replacement  [] Pacing for Cardiac Index  []Physician Order    Lines/Drains  LDA Insertion Date Discontinued Date Dressing Changes   Art line 7/17 7/18    Central Line 7/17     Ortiz 7/17     Chest Tube 7/17 R pleural 7/18  Mediastinal 7/19    Wires  7/17     ETT 7/17 7/18    [place] 7/17 7/18    VasCath      Impella        Interventions After Office Hours  Problem(Brief) Date Time Intervention Physician contacted    
 --  252    < > = values in this interval not displayed.     BMP:   Recent Labs     07/21/24  1800 07/21/24  2341 07/22/24  0605    138 141   K 4.0 3.9 4.2   CL 97* 98* 100   CO2 26 28 29   BUN 17 18 17   CREATININE 0.9 0.9 0.8*     MG:    Recent Labs     07/20/24  0421 07/21/24  0615 07/22/24  0605   MG 2.10 2.20 2.10      PT/INR:   No results for input(s): \"PROTIME\", \"INR\" in the last 72 hours.    APTT:   No results for input(s): \"APTT\" in the last 72 hours.    Xray Result (most recent):  XR CHEST PORTABLE 07/22/2024    Narrative  EXAMINATION:  ONE XRAY VIEW OF THE CHEST    7/22/2024 6:53 am    COMPARISON:  Chest radiograph dated 07/21/2024    HISTORY:  ORDERING SYSTEM PROVIDED HISTORY: Post op open heart surgery, assess chest  tubes and lines position and pulmonary edema  TECHNOLOGIST PROVIDED HISTORY:  Reason for exam:->Post op open heart surgery, assess chest tubes and lines  position and pulmonary edema    FINDINGS:  Medical devices: Stable radiographic appearance of right IJ approach central  venous sheath as well as left surgical chest tube.    Mediastinum/Heart: The mediastinal contours are unchanged compared to prior  exam. Postsurgical changes from median sternotomy noted.    Lungs: No significant interval change in perihilar and bibasilar parenchymal  opacities.    Pleura: Stable appearance of bilateral pleural effusions, right greater than  left.  No evidence of pneumothorax.    Bones/Soft tissues: Nothing acute.    Impression  No significant interval change in bibasilar pleuroparenchymal disease, right  greater than left.    Stable medical support devices.      Physicial Exam:   General appearance: awake, A&O, up in chair  Lungs: diminished bases  Heart: SR; HR in the 90's this morning   Chest: symmetrical expansion with inspiration and expirations; sternum stable  Abdomen: +bowel sounds, non-tender   Wound/Incisions: Midsternal incision CDI/ ЕКАТЕРИНА; Pacing wires intact and taped to patient 
about how he is feeling or about the pain  Orientation:    oriented to person not formally assessed but pt is still lethargic this date  Command Following:   accurately follows one step commands    Education  Barriers To Learning: none  Patient Education: patient educated on PT role and benefits, plan of care, precautions, functional mobility training, transfer training, discharge recommendations  Learning Assessment:  patient verbalizes understanding, would benefit from continued reinforcement    Assessment  Activity Tolerance: limited by pain in low back and fatigue; vitals stable throughout; SpO2 remained in 90s throughout, encouraged to take deep breaths when resting after ambulation; pt complained of dizziness when initially sitting upright but no dizziness when standing   BP before ambulation, sitting in recliner: 114/76  BP after ambulation, sitting in recliner: 126/77  Impairments Requiring Therapeutic Intervention: decreased functional mobility, decreased strength, decreased endurance, decreased sensation, decreased balance, increased pain  Prognosis: good  Clinical Assessment: Patient is a 49 year old male who is s/p CABG 7/17/2024. Patient is presenting below his baseline with the deficits above. Prior to admission, patient was independent with no device for functional mobility. Patient is lethargic today but still able to participate in therapy. Today, patient was Geraldo x 2 for sit to stands and Geraldo for ambulation. Patient would benefit from skilled physical therapy in order to return to PLOF and promote independence.  Safety Interventions: patient left in chair, call light within reach, gait belt, patient at risk for falls, and nurse notified    Plan  Frequency: 3-5 x/per week  Current Treatment Recommendations: strengthening, ROM, balance training, functional mobility training, transfer training, gait training, endurance training, safety education, and equipment evaluation/education    Goals  Patient 
No results found for: \"TSH\"  Troponin: No results found for: \"TROPONINT\"  Lactic Acid: No results for input(s): \"LACTA\" in the last 72 hours.  BNP: No results for input(s): \"PROBNP\" in the last 72 hours.  UA:  Lab Results   Component Value Date/Time    NITRU Negative 07/15/2024 03:47 PM    COLORU Yellow 07/15/2024 03:47 PM    PHUR 5.5 07/15/2024 03:47 PM    PHUR 6.0 06/08/2023 11:37 AM    CLARITYU Clear 07/15/2024 03:47 PM    LEUKOCYTESUR Negative 07/15/2024 03:47 PM    UROBILINOGEN 1.0 07/15/2024 03:47 PM    BILIRUBINUR Negative 07/15/2024 03:47 PM    BLOODU Negative 07/15/2024 03:47 PM    GLUCOSEU >=1000 07/15/2024 03:47 PM    GLUCOSEU NEGATIVE 06/25/2010 10:40 PM    KETUA 15 07/15/2024 03:47 PM     Urine Cultures: No results found for: \"LABURIN\"  Blood Cultures: No results found for: \"BC\"  No results found for: \"BLOODCULT2\"  Organism: No results found for: \"ORG\"      Electronically signed by Nia Gray MD on 7/19/2024 at 1:17 PM

## 2024-07-24 NOTE — DISCHARGE SUMMARY
Disp-30 tablet, R-3Normal              Labs:  Renal:   Lab Results   Component Value Date/Time     07/23/2024 05:30 AM    K 4.2 07/23/2024 05:30 AM    K 4.4 07/15/2024 02:38 AM    CL 97 07/23/2024 05:30 AM    CO2 30 07/23/2024 05:30 AM    BUN 17 07/23/2024 05:30 AM    CREATININE 0.7 07/23/2024 05:30 AM     Heme:   Lab Results   Component Value Date/Time    WBC 7.2 07/23/2024 05:30 AM    HGB 9.6 07/23/2024 05:30 AM    HCT 28.9 07/23/2024 05:30 AM    MCV 87.7 07/23/2024 05:30 AM     07/23/2024 05:30 AM     HgA1C:   Lab Results   Component Value Date/Time    LABA1C 9.8 07/15/2024 12:04 PM     PT/INR: No results for input(s): \"PROTIME\", \"INR\" in the last 72 hours.    Admission WT: 79.3 kg  Pre-Op WT: 78.3 kg  Discharge WT: 81.5 kg      Patient Instructions: See AVS for full list of discharge instructions given to patient  Activity: DO NOT LIFT, PUSH, OR PULL ANYTHING OVER 5 POUNDS FOR 8 WEEK from the day of surgery  Diet:  cardiac diet  Wound Care:  WASH YOUR INCISIONS DAILY WITH A CLEAN WASHCLOTH AND ANTIBACTERIAL SOAP. Do not wash your incisions after you have cleansed other parts of your body.    Follow-up Appointments:   Cardiothoracic Surgeon, Dr. Conti on 8/1/24 @ 9:30 am  Cardiology CNP to be scheduled.       Electronically signed by Rogerio Mejia PA-C on 7/24/2024 at 7:43 AM

## 2024-08-01 ENCOUNTER — OFFICE VISIT (OUTPATIENT)
Age: 49
End: 2024-08-01

## 2024-08-01 VITALS
SYSTOLIC BLOOD PRESSURE: 110 MMHG | HEART RATE: 74 BPM | WEIGHT: 164 LBS | BODY MASS INDEX: 23.48 KG/M2 | TEMPERATURE: 98 F | OXYGEN SATURATION: 100 % | HEIGHT: 70 IN | DIASTOLIC BLOOD PRESSURE: 60 MMHG

## 2024-08-01 DIAGNOSIS — I50.20 HFREF (HEART FAILURE WITH REDUCED EJECTION FRACTION) (HCC): ICD-10-CM

## 2024-08-01 DIAGNOSIS — I25.5 ISCHEMIC CARDIOMYOPATHY: ICD-10-CM

## 2024-08-01 DIAGNOSIS — I25.10 CAD, MULTIPLE VESSEL: ICD-10-CM

## 2024-08-01 DIAGNOSIS — E11.649 UNCONTROLLED TYPE 2 DIABETES MELLITUS WITH HYPOGLYCEMIA WITHOUT COMA (HCC): ICD-10-CM

## 2024-08-01 DIAGNOSIS — E78.5 DYSLIPIDEMIA: ICD-10-CM

## 2024-08-01 DIAGNOSIS — Z95.1 S/P CABG X 4: Primary | ICD-10-CM

## 2024-08-01 DIAGNOSIS — I21.4 NSTEMI (NON-ST ELEVATED MYOCARDIAL INFARCTION) (HCC): ICD-10-CM

## 2024-08-12 PROCEDURE — 99024 POSTOP FOLLOW-UP VISIT: CPT | Performed by: STUDENT IN AN ORGANIZED HEALTH CARE EDUCATION/TRAINING PROGRAM

## 2024-08-12 NOTE — PROGRESS NOTES
Cardiothoracic Surgery Clinic Progress Note       8/12/2024 12:36 PM  Surgeon: Carmela Conti MD  Referring: Dr. Jose for CABG    Chief complaint: post op check    HPI:    Mr. Ordoñez is a 49 y.o. y.o. male with PMHx of HFrEF 35%, NSTEMI, multivessel CAD involving LAD, uncontrolled insulin-dependent T2DM (A1c 9.8), HLD s/p 4V CABG (left internal mammary artery to left anterior descending artery, reversed saphenous vein graft to diag; reversed saphenous vein graft to OM2; reversed saphenous vein graft to PDA) with myself on 7/15/2024. The patient's post-operative course was uneventful but he required significant councelling by our diabetic educators with assistance for outpatient glucose monitoring and treatment. He was discharged to home on POD 6 07/23/2024.    Today he presents to clinic and looks well. He is unsure who he's supposed to be seeing for his glucose monitoring but is keeping track of his glucose levels on his phone while are varied with many values in the low 200s. He continues to ambulate more each day, is eating and drinking well, and having regular bathroom habits. Denies clicks or rubs of the sternum. Denies incisional redness or drainage.     Current Outpatient Medications:  Current Outpatient Medications   Medication Instructions    Alcohol Swabs (ALCOHOL PREP) 70 % PADS 1 each, Does not apply, 4 TIMES DAILY    aspirin 81 mg, Oral, DAILY    atorvastatin (LIPITOR) 80 mg, Oral, NIGHTLY    clopidogrel (PLAVIX) 75 mg, Oral, DAILY    Continuous Glucose Sensor (FREESTYLE GUERRERO 3 SENSOR) MISC 1 each, Does not apply, EVERY 14 DAYS, ICD code- E11.65, Z79.4    insulin glargine (LANTUS) 18 Units, SubCUTAneous, DAILY, Okay to change to pens    insulin lispro (HUMALOG,ADMELOG) 0-8 Units, SubCUTAneous, 3 TIMES DAILY WITH MEALS, Okay to change  to pens    Insulin Pen Needle 32G X 4 MM MISC 1 each, Does not apply, 4 TIMES DAILY    metFORMIN (GLUCOPHAGE) 500 mg, Oral, 2 TIMES

## 2024-08-20 ENCOUNTER — OFFICE VISIT (OUTPATIENT)
Age: 49
End: 2024-08-20
Payer: COMMERCIAL

## 2024-08-20 VITALS
HEIGHT: 67 IN | HEART RATE: 75 BPM | BODY MASS INDEX: 27.78 KG/M2 | SYSTOLIC BLOOD PRESSURE: 142 MMHG | OXYGEN SATURATION: 99 % | WEIGHT: 177 LBS | DIASTOLIC BLOOD PRESSURE: 80 MMHG

## 2024-08-20 DIAGNOSIS — Z95.1 S/P CABG X 4: Primary | ICD-10-CM

## 2024-08-20 PROCEDURE — 1036F TOBACCO NON-USER: CPT

## 2024-08-20 PROCEDURE — G8419 CALC BMI OUT NRM PARAM NOF/U: HCPCS

## 2024-08-20 PROCEDURE — 99214 OFFICE O/P EST MOD 30 MIN: CPT

## 2024-08-20 PROCEDURE — G8427 DOCREV CUR MEDS BY ELIG CLIN: HCPCS

## 2024-08-20 PROCEDURE — 1111F DSCHRG MED/CURRENT MED MERGE: CPT

## 2024-08-20 NOTE — PROGRESS NOTES
symptoms, conducting an examination, reviewing the patient's diagnostic test results and education. “     Electronically signed by Denise Deleon PA-C on 8/20/2024 at 2:12 PM

## 2024-08-22 ENCOUNTER — TELEPHONE (OUTPATIENT)
Age: 49
End: 2024-08-22

## 2024-08-22 NOTE — TELEPHONE ENCOUNTER
Surgery: 7/17/24  CABG X 4 (LIMA TO LAD, SVG TO OM2, SVG TO PDA, SVG TO DIAG), TCPB, AYSHA, DOPPLER FLOW VERIFICATION OF BYPASS GRAFTS WITH MEDISTIM PROBE 3.0, EVH OF RIGHT SAPHENOUS VEIN, INSERTION OF TEMPORARY ATRIAL AND VENTRICULAR PACING WIRES                                                                                                    Physician: Dr. Conti    Called patient to check BP he states he takes his BP at random times and it has been running 145/91 &149/85 HR 88,69.He states that hw has been up all dya with court and work.  Spoke to Denise EL we will recheck tomorrow and call him after 3pm.Instructed to take his bp 30-40 min after taking metoprolol.Pt verbalized understanding.

## 2024-08-23 NOTE — TELEPHONE ENCOUNTER
Called patient he  informed that he had done bp incorrectly .HHN came today and it was 129/81 P 81 and /92.He has a wrist monitor and the bp was rechecked by HHN by arm cuff.

## 2024-08-27 ENCOUNTER — TELEPHONE (OUTPATIENT)
Age: 49
End: 2024-08-27

## 2024-08-27 NOTE — TELEPHONE ENCOUNTER
In response to pt request to have RTW letter sent to [email or web address], [email or web address], [email or web address]..... Letter emailed to all three entities. Pt notified.

## 2024-09-10 ENCOUNTER — OFFICE VISIT (OUTPATIENT)
Age: 49
End: 2024-09-10

## 2024-09-10 VITALS
OXYGEN SATURATION: 99 % | DIASTOLIC BLOOD PRESSURE: 70 MMHG | WEIGHT: 171 LBS | BODY MASS INDEX: 27.48 KG/M2 | TEMPERATURE: 97.5 F | SYSTOLIC BLOOD PRESSURE: 130 MMHG | HEART RATE: 65 BPM | HEIGHT: 66 IN

## 2024-09-10 DIAGNOSIS — Z95.1 S/P CABG X 4: Primary | ICD-10-CM

## 2024-09-10 PROCEDURE — 99024 POSTOP FOLLOW-UP VISIT: CPT

## 2024-09-13 ENCOUNTER — TELEPHONE (OUTPATIENT)
Age: 49
End: 2024-09-13

## 2024-10-23 ENCOUNTER — TELEPHONE (OUTPATIENT)
Age: 49
End: 2024-10-23

## 2024-10-23 NOTE — TELEPHONE ENCOUNTER
Pt sent a message regarding development of increased difficulty with breathing and right sided chest pain with walking. He walks everywhere as stated and he is concerned how this will affect him in the colder months. Initially responded to his query per message. Since no response then later a call has been placed and LMOR with request to call back. Await a return call.

## 2024-10-24 ENCOUNTER — TELEPHONE (OUTPATIENT)
Age: 49
End: 2024-10-24

## 2024-10-24 DIAGNOSIS — R06.02 SHORTNESS OF BREATH: Primary | ICD-10-CM

## 2024-10-24 NOTE — TELEPHONE ENCOUNTER
Spoke with pt. States that over the last few weeks he has noticed the gradual onset of shortness of breath on exertion and has developed pain in his right chest. Most noticeable after walking a few blocks carrying groceries. States typically he finds it hard to breathe in colder weather but now it is occurring sooner. Denies dyspnea at rest. No productive cough. No fevers. Unsure if has gained any weight as he has not yet purchased a scale. Denies edema in hands or feet. Sees cardiologist on 11/5/24.   Ordered CXR for evaluation. Depending on results will defer to scheduling an appt or f/u with cardio. Continue to monitor.   Verbalized understanding and agrees with plan.

## 2024-10-25 ENCOUNTER — TELEPHONE (OUTPATIENT)
Dept: CARDIOTHORACIC SURGERY | Age: 49
End: 2024-10-25

## 2024-10-25 NOTE — TELEPHONE ENCOUNTER
Patient is s/p CABG 3 months ago on 7/17/24. Patient called the office yesterday with complaints of gradual shortness of breath and right chest pain with exertion. CXR was ordered with follow up phone call pending results.. I Callled and left VM to remind patient to get CXR. Did advise patient to follow up with Cardiology in the interim.     Denise Deleon PA-C

## 2024-10-26 ENCOUNTER — HOSPITAL ENCOUNTER (OUTPATIENT)
Age: 49
Discharge: HOME | End: 2024-10-26
Payer: COMMERCIAL

## 2024-10-26 ENCOUNTER — HOSPITAL ENCOUNTER (OUTPATIENT)
Dept: GENERAL RADIOLOGY | Age: 49
Discharge: HOME | End: 2024-10-26
Payer: COMMERCIAL

## 2024-10-26 DIAGNOSIS — R06.02 SHORTNESS OF BREATH: ICD-10-CM

## 2024-10-26 PROCEDURE — 71046 X-RAY EXAM CHEST 2 VIEWS: CPT

## 2024-10-28 ENCOUNTER — TELEPHONE (OUTPATIENT)
Dept: CARDIOTHORACIC SURGERY | Age: 49
End: 2024-10-28

## 2024-10-28 NOTE — TELEPHONE ENCOUNTER
Patient returned call in regards to CXR. Explained imaging; advise patient follow up with Cardiology with any progressive chest pain. Patients reports gradual shortness of breath with increase ambulation and right sided chest pain that resolves with rest. Patient agrees and understands to follow up with Cardiology. Patient does have an appt with Cardiology next week.     Denise Deleon PA-C

## 2024-10-28 NOTE — TELEPHONE ENCOUNTER
CXR reviewed by NIKKO Montgomery. CXR normal. Pt should follow up with PCP and cardio. She LMOR for pt.

## 2024-10-28 NOTE — TELEPHONE ENCOUNTER
Called and left  regarding patient's CXR. Imaging reviewed, CXR negative for pleural effusion or PTX. Patient also with exertional right sided chest pain. Pt is s/p CABG 3 months ago on 7/17/24 (sternal restrictions recently lifted on 10/17/24) explained discomfort can be affiliated with surgery especially since patient has been back to work for some time now. Advise if pain becomes persistent or progressive to follow up with Cardiology. Advise he call our office to confirm information left on VM and to call our office with any other questions or concerns.      Denise Deleon PA-C

## 2024-11-05 ENCOUNTER — OFFICE VISIT (OUTPATIENT)
Dept: CARDIOLOGY CLINIC | Age: 49
End: 2024-11-05

## 2024-11-05 VITALS
HEART RATE: 80 BPM | SYSTOLIC BLOOD PRESSURE: 108 MMHG | OXYGEN SATURATION: 98 % | DIASTOLIC BLOOD PRESSURE: 60 MMHG | BODY MASS INDEX: 27.48 KG/M2 | HEIGHT: 66 IN | WEIGHT: 171 LBS

## 2024-11-05 DIAGNOSIS — R06.02 SHORTNESS OF BREATH: ICD-10-CM

## 2024-11-05 DIAGNOSIS — R07.9 CHEST PAIN, UNSPECIFIED TYPE: ICD-10-CM

## 2024-11-05 DIAGNOSIS — I10 ESSENTIAL HYPERTENSION: ICD-10-CM

## 2024-11-05 DIAGNOSIS — I25.708 CORONARY ARTERY DISEASE INVOLVING CORONARY BYPASS GRAFT OF NATIVE HEART WITH OTHER FORMS OF ANGINA PECTORIS (HCC): ICD-10-CM

## 2024-11-05 DIAGNOSIS — I25.810 CORONARY ARTERY DISEASE INVOLVING CORONARY BYPASS GRAFT OF NATIVE HEART WITHOUT ANGINA PECTORIS: Primary | ICD-10-CM

## 2024-11-05 DIAGNOSIS — E78.5 HYPERLIPIDEMIA, UNSPECIFIED HYPERLIPIDEMIA TYPE: ICD-10-CM

## 2024-11-05 RX ORDER — EMPAGLIFLOZIN 10 MG/1
10 TABLET, FILM COATED ORAL EVERY MORNING
COMMUNITY

## 2024-11-05 NOTE — PROGRESS NOTES
seated. Notes off/on numbness from vein harvesting. -He did not complete cardiac rehab, phase II  -working 3rd shift and walks 'a lot.\"   -  -Repeat fasting labs LDLc 51, HDL 42 with A1c down 6.5 9/6/24 [facility]   -abnormal but stable CMP and CBC 9/6/24 [facility]   -continue with medical management and risk factor modification   -aspirin, statin, B-blocker, Plavix   -Jardiance and diabetic medications   -Given the patient recent CABG, I am concerned about his symptoms of exertional chest tightness and shortness of breath.  They are suspicious for angina  I will complete treadmill perfusion myoview and hold his metoprolol succinate for 2 doses prior to testing. Will also complete echocardiogram. After testing completed, I will refer him to participate in cardiac rehab, phase II accordingly.    Essential hypertension  -controlled  -BMP abnormal but stable 9/6/24 [facility]   -continue medical therapy    Hyperlipidemia, unspecified   Last lipid profile from 9/6/2024 shows LDL at 51  -continue high intensity statin     Instructed to obtain flu vaccine this season, along with any vaccination due annually per guidelines.            We will plan 4-5 months   Total visit time > 35 minutes; > 50% spend counseling / coordinating care. I reviewed interval history, physical exam, review of data including labs, imaging, development and implementation of treatment plan and coordination of complex care. Counseled on risk factor modifications.    Thank you very much for allowing me to participate in the care of your patient. Please do not hesitate to contact me if you have any questions.    Sincerely,  Srini Pulido MD      [facility]  [address], [address]   [place] [address]  Ph: [phone]  Fax: [phone]    This note was scribed in the presence of Dr. TONY Pulido MD by Vicky Turner, RN.  Physician Attestation:  The scribes documentation has been prepared under my direction and personally

## 2024-11-22 RX ORDER — ASPIRIN 81 MG/1
81 TABLET, COATED ORAL DAILY
Qty: 90 TABLET | Refills: 4 | Status: SHIPPED | OUTPATIENT
Start: 2024-11-22

## 2024-11-22 NOTE — TELEPHONE ENCOUNTER
Last OV 11/5/24  Next OV: 3/5/25  Last refill: 7/24/24  Most recent Labs:   Last EKG (if needed):

## 2024-12-20 ENCOUNTER — HOSPITAL ENCOUNTER (OUTPATIENT)
Dept: NUCLEAR MEDICINE | Age: 49
Discharge: HOME | End: 2024-12-20
Attending: INTERNAL MEDICINE
Payer: COMMERCIAL

## 2024-12-20 ENCOUNTER — HOSPITAL ENCOUNTER (OUTPATIENT)
Age: 49
Discharge: HOME | End: 2024-12-22
Attending: INTERNAL MEDICINE
Payer: COMMERCIAL

## 2024-12-20 VITALS
BODY MASS INDEX: 28.49 KG/M2 | SYSTOLIC BLOOD PRESSURE: 123 MMHG | DIASTOLIC BLOOD PRESSURE: 79 MMHG | WEIGHT: 171 LBS | HEIGHT: 65 IN

## 2024-12-20 VITALS — HEIGHT: 66 IN | BODY MASS INDEX: 28.93 KG/M2 | WEIGHT: 180 LBS

## 2024-12-20 DIAGNOSIS — R06.02 SHORTNESS OF BREATH: ICD-10-CM

## 2024-12-20 DIAGNOSIS — R07.9 CHEST PAIN, UNSPECIFIED TYPE: ICD-10-CM

## 2024-12-20 DIAGNOSIS — I25.708 CORONARY ARTERY DISEASE INVOLVING CORONARY BYPASS GRAFT OF NATIVE HEART WITH OTHER FORMS OF ANGINA PECTORIS (HCC): ICD-10-CM

## 2024-12-20 LAB
ECHO AO ASC DIAM: 3 CM
ECHO AO ASCENDING AORTA INDEX: 1.62 CM/M2
ECHO AO ROOT DIAM: 2.8 CM
ECHO AO ROOT INDEX: 1.51 CM/M2
ECHO AR MAX VEL PISA: 2.5 M/S
ECHO AV AREA PEAK VELOCITY: 2 CM2
ECHO AV AREA VTI: 1.8 CM2
ECHO AV AREA/BSA PEAK VELOCITY: 1.1 CM2/M2
ECHO AV AREA/BSA VTI: 1 CM2/M2
ECHO AV CUSP MM: 1.9 CM
ECHO AV MEAN GRADIENT: 3 MMHG
ECHO AV MEAN VELOCITY: 0.8 M/S
ECHO AV PEAK GRADIENT: 6 MMHG
ECHO AV PEAK VELOCITY: 1.2 M/S
ECHO AV VELOCITY RATIO: 0.58
ECHO AV VTI: 27.3 CM
ECHO BSA: 1.89 M2
ECHO BSA: 1.95 M2
ECHO EST RA PRESSURE: 3 MMHG
ECHO LA DIAMETER INDEX: 2.05 CM/M2
ECHO LA DIAMETER: 3.8 CM
ECHO LA TO AORTIC ROOT RATIO: 1.36
ECHO LV E' LATERAL VELOCITY: 4.87 CM/S
ECHO LV E' SEPTAL VELOCITY: 5.03 CM/S
ECHO LV EDV 3D: 178 ML
ECHO LV EDV A4C: 105 ML
ECHO LV EDV INDEX 3D: 96 ML/M2
ECHO LV EDV INDEX A4C: 57 ML/M2
ECHO LV EF PHYSICIAN: 45 %
ECHO LV EJECTION FRACTION 3D: 44 %
ECHO LV EJECTION FRACTION A4C: 40 %
ECHO LV ESV 3D: 99 ML
ECHO LV ESV A4C: 63 ML
ECHO LV ESV INDEX 3D: 54 ML/M2
ECHO LV ESV INDEX A4C: 34 ML/M2
ECHO LV FRACTIONAL SHORTENING: 17 % (ref 28–44)
ECHO LV GLOBAL LONGITUDINAL STRAIN (GLS): -12.3 %
ECHO LV INTERNAL DIMENSION DIASTOLE INDEX: 2.54 CM/M2
ECHO LV INTERNAL DIMENSION DIASTOLIC: 4.7 CM (ref 4.2–5.9)
ECHO LV INTERNAL DIMENSION SYSTOLIC INDEX: 2.11 CM/M2
ECHO LV INTERNAL DIMENSION SYSTOLIC: 3.9 CM
ECHO LV IVSD: 1 CM (ref 0.6–1)
ECHO LV MASS 2D: 164.5 G (ref 88–224)
ECHO LV MASS 3D INDEX: 94.6 G/M2
ECHO LV MASS 3D: 175 G
ECHO LV MASS INDEX 2D: 88.9 G/M2 (ref 49–115)
ECHO LV POSTERIOR WALL DIASTOLIC: 1 CM (ref 0.6–1)
ECHO LV RELATIVE WALL THICKNESS RATIO: 0.43
ECHO LVOT AREA: 3.5 CM2
ECHO LVOT AV VTI INDEX: 0.54
ECHO LVOT DIAM: 2.1 CM
ECHO LVOT MEAN GRADIENT: 1 MMHG
ECHO LVOT PEAK GRADIENT: 2 MMHG
ECHO LVOT PEAK VELOCITY: 0.7 M/S
ECHO LVOT STROKE VOLUME INDEX: 27.7 ML/M2
ECHO LVOT SV: 51.2 ML
ECHO LVOT VTI: 14.8 CM
ECHO MV A VELOCITY: 0.32 M/S
ECHO MV AREA VTI: 1.1 CM2
ECHO MV E DECELERATION TIME (DT): 257 MS
ECHO MV E VELOCITY: 1.01 M/S
ECHO MV E/A RATIO: 3.16
ECHO MV E/E' LATERAL: 20.74
ECHO MV E/E' RATIO (AVERAGED): 20.41
ECHO MV E/E' SEPTAL: 20.08
ECHO MV LVOT VTI INDEX: 3.02
ECHO MV MAX VELOCITY: 1.2 M/S
ECHO MV MEAN GRADIENT: 2 MMHG
ECHO MV MEAN VELOCITY: 0.6 M/S
ECHO MV PEAK GRADIENT: 6 MMHG
ECHO MV REGURGITANT PEAK GRADIENT: 44 MMHG
ECHO MV REGURGITANT PEAK VELOCITY: 3.3 M/S
ECHO MV VTI: 44.7 CM
ECHO PULMONARY ARTERY END DIASTOLIC PRESSURE: 10 MMHG
ECHO PV ACCELERATION TIME (AT): 92 MS
ECHO PV MAX VELOCITY: 1 M/S
ECHO PV MAX VELOCITY: 1.6 M/S
ECHO PV PEAK GRADIENT: 4 MMHG
ECHO RIGHT VENTRICULAR SYSTOLIC PRESSURE (RVSP): 29 MMHG
ECHO RV FREE WALL PEAK S': 6.9 CM/S
ECHO RV INTERNAL DIMENSION: 2.9 CM
ECHO RV TAPSE: 1.4 CM (ref 1.7–?)
ECHO TV E WAVE: 0.4 M/S
ECHO TV PEAK GRADIENT: 2 MMHG
ECHO TV REGURGITANT MAX VELOCITY: 2.54 M/S
ECHO TV REGURGITANT PEAK GRADIENT: 26 MMHG
NUC REST DIASTOLIC VOLUME INDEX: 164 ML/M2
NUC REST EJECTION FRACTION: 48 %
NUC REST SYSTOLIC VOLUME INDEX: 86 ML/M2
STRESS ANGINA INDEX: 0
STRESS BASELINE DIAS BP: 86 MMHG
STRESS BASELINE HR: 63 BPM
STRESS BASELINE SYS BP: 115 MMHG
STRESS ESTIMATED WORKLOAD: 7 METS
STRESS EXERCISE DUR MIN: 4 MIN
STRESS EXERCISE DUR SEC: 29 SEC
STRESS PEAK DIAS BP: 82 MMHG
STRESS PEAK SYS BP: 139 MMHG
STRESS PERCENT HR ACHIEVED: 95 %
STRESS POST PEAK HR: 164 BPM
STRESS RATE PRESSURE PRODUCT: NORMAL BPM*MMHG
STRESS TARGET HR: 173 BPM

## 2024-12-20 PROCEDURE — 76376 3D RENDER W/INTRP POSTPROCES: CPT | Performed by: INTERNAL MEDICINE

## 2024-12-20 PROCEDURE — A9502 TC99M TETROFOSMIN: HCPCS | Performed by: INTERNAL MEDICINE

## 2024-12-20 PROCEDURE — 3430000000 HC RX DIAGNOSTIC RADIOPHARMACEUTICAL: Performed by: INTERNAL MEDICINE

## 2024-12-20 PROCEDURE — 78452 HT MUSCLE IMAGE SPECT MULT: CPT

## 2024-12-20 PROCEDURE — 93306 TTE W/DOPPLER COMPLETE: CPT

## 2024-12-20 PROCEDURE — 93017 CV STRESS TEST TRACING ONLY: CPT

## 2024-12-20 PROCEDURE — 93306 TTE W/DOPPLER COMPLETE: CPT | Performed by: INTERNAL MEDICINE

## 2024-12-20 PROCEDURE — 93356 MYOCRD STRAIN IMG SPCKL TRCK: CPT | Performed by: INTERNAL MEDICINE

## 2024-12-20 RX ADMIN — TETROFOSMIN 32.2 MILLICURIE: 1.38 INJECTION, POWDER, LYOPHILIZED, FOR SOLUTION INTRAVENOUS at 08:54

## 2024-12-20 RX ADMIN — TETROFOSMIN 13.6 MILLICURIE: 1.38 INJECTION, POWDER, LYOPHILIZED, FOR SOLUTION INTRAVENOUS at 07:40

## 2024-12-27 ENCOUNTER — TELEPHONE (OUTPATIENT)
Dept: CARDIOLOGY CLINIC | Age: 49
End: 2024-12-27

## 2024-12-27 DIAGNOSIS — I25.10 CAD, MULTIPLE VESSEL: Primary | ICD-10-CM

## 2024-12-27 DIAGNOSIS — Z95.1 S/P CABG X 4: ICD-10-CM

## 2024-12-27 NOTE — TELEPHONE ENCOUNTER
Srini Pulido MD Jones, Rachel N, RN  I have left a voicemail message for the patient to call me back yesterday.  Hold off on scheduling him for coronary angiogram till he talks to me.

## 2024-12-27 NOTE — TELEPHONE ENCOUNTER
Please reach out  to patient and schedule LHC with Dr. Mendoza at [facility].     Left heart Catheterization Pre procedure Instructions    Date:______________________________    Arrive at:__________________________    Procedure time:____________________    The morning of your procedure you will park in the hospital parking lot and report directly to the cath lab to check in.   At the information desk stay right and go all the way to the end of the love, this will take you directly to your check in desk for the cath lab.    Pre-Procedure Instructions   You will need to fast (nothing to eat or drink) after midnight the day of your procedure. No caffeine the morning of.   Do NOT chew gum or eat mints the day of your procedure.  Take your Plavix the morning of the procedure.   Hold all diabetic medications including, Metfomin.  If you take Lantus/Levemir only take ½ your normal dose the evening before.  All other medications can be taken in the morning with sips of water.   You will need to take 325 mg aspirin the morning of.  If you are currently taking 81 mg please take 4 tablets that morning.   Do not use any lotions, creams or perfume the morning of procedure.   Pre-procedure lab work will need to be completed 5-7 days prior to procedure.   Please have a responsible adult to drive you home after procedure. We advise you have someone to stay with you for 24 hours following procedure for precautionary measures. Depending on procedure you may require an overnight stay.   Cath lab will provide you with all post procedure instructions.     If you have any questions regarding the procedure itself or medications, please call [phone] and ask to speak with a nurse.                 [facility]     [facility]  [address]  [address]   [place] [address]  Phone: [phone]  The hours are Mon -Fri.  6:30 am - 4:00 pm   Saturday 8:00 am - noon  No appointment necessary

## 2025-01-07 NOTE — TELEPHONE ENCOUNTER
I just spoke to Domingo and he is agreeable to proceed with angiography.  Please have Dr. Blackman schedule him for the procedure.  Patient prefers Monday

## 2025-01-07 NOTE — TELEPHONE ENCOUNTER
Landon called to f/u on scheduling his angiogram. Relayed that once we hear back from Dr. Pulido, we will call him to schedule.

## 2025-01-08 NOTE — TELEPHONE ENCOUNTER
Date of Procedure: Monday 1/20/25 @ [facility] with Dr. Blackman     Time of arrival: 10:30 am     Procedure time: 12:00 pm     Spoke to Landon and he is agreeable to date and time. Reviewed and sent [employer] message with his procedure date, time and instructions and he verbalized understanding. Encouraged to call with any questions or concerns.     Published on [employer] ([employer]) and e-mail to Ivonne.

## 2025-01-09 NOTE — TELEPHONE ENCOUNTER
Landon called to update that he has new insurance that started first of 2025 - updated in his registration. He now has [facility] [place] PPO.    Callback: [phone]

## 2025-01-10 ENCOUNTER — TELEPHONE (OUTPATIENT)
Dept: CARDIOLOGY CLINIC | Age: 50
End: 2025-01-10

## 2025-01-10 NOTE — TELEPHONE ENCOUNTER
Mariela from [employer] called in she states patients upcoming procedure with Dr. Blackman has been approved.      It is valid from 1/9/2025-2/7/2025    Authorization# is [id number]

## 2025-01-13 PROBLEM — I25.10 CAD IN NATIVE ARTERY: Status: ACTIVE | Noted: 2024-12-27

## 2025-01-13 PROBLEM — Z95.1 HX OF CABG: Status: ACTIVE | Noted: 2024-12-27

## 2025-01-20 ENCOUNTER — HOSPITAL ENCOUNTER (INPATIENT)
Age: 50
LOS: 3 days | Discharge: HOME | End: 2025-01-24
Attending: INTERNAL MEDICINE | Admitting: INTERNAL MEDICINE
Payer: COMMERCIAL

## 2025-01-20 DIAGNOSIS — Z95.1 HX OF CABG: ICD-10-CM

## 2025-01-20 DIAGNOSIS — R94.39 ABNORMAL NUCLEAR STRESS TEST: ICD-10-CM

## 2025-01-20 DIAGNOSIS — I25.10 CAD IN NATIVE ARTERY: ICD-10-CM

## 2025-01-20 DIAGNOSIS — I25.10 CAD, MULTIPLE VESSEL: Primary | ICD-10-CM

## 2025-01-20 PROBLEM — I20.0 UNSTABLE ANGINA (HCC): Status: ACTIVE | Noted: 2025-01-20

## 2025-01-20 LAB
ANION GAP SERPL CALCULATED.3IONS-SCNC: 13 MMOL/L (ref 3–16)
BUN SERPL-MCNC: 16 MG/DL (ref 7–20)
CALCIUM SERPL-MCNC: 9.9 MG/DL (ref 8.3–10.6)
CHLORIDE SERPL-SCNC: 107 MMOL/L (ref 99–110)
CO2 SERPL-SCNC: 22 MMOL/L (ref 21–32)
CREAT SERPL-MCNC: 0.8 MG/DL (ref 0.9–1.3)
DEPRECATED RDW RBC AUTO: 14.4 % (ref 12.4–15.4)
ECHO BSA: 1.93 M2
EKG ATRIAL RATE: 76 BPM
EKG DIAGNOSIS: NORMAL
EKG P AXIS: 42 DEGREES
EKG P-R INTERVAL: 142 MS
EKG Q-T INTERVAL: 398 MS
EKG QRS DURATION: 88 MS
EKG QTC CALCULATION (BAZETT): 447 MS
EKG R AXIS: -58 DEGREES
EKG T AXIS: 113 DEGREES
EKG VENTRICULAR RATE: 76 BPM
GFR SERPLBLD CREATININE-BSD FMLA CKD-EPI: >90 ML/MIN/{1.73_M2}
GLUCOSE BLD-MCNC: 182 MG/DL (ref 70–99)
GLUCOSE BLD-MCNC: 206 MG/DL (ref 70–99)
GLUCOSE SERPL-MCNC: 157 MG/DL (ref 70–99)
HCT VFR BLD AUTO: 45.5 % (ref 40.5–52.5)
HGB BLD-MCNC: 15.4 G/DL (ref 13.5–17.5)
MCH RBC QN AUTO: 29.6 PG (ref 26–34)
MCHC RBC AUTO-ENTMCNC: 33.8 G/DL (ref 31–36)
MCV RBC AUTO: 87.4 FL (ref 80–100)
PERFORMED ON: ABNORMAL
PERFORMED ON: ABNORMAL
PLATELET # BLD AUTO: 217 K/UL (ref 135–450)
PMV BLD AUTO: 9.8 FL (ref 5–10.5)
POC ACT LR: 359 SEC
POTASSIUM SERPL-SCNC: 4.1 MMOL/L (ref 3.5–5.1)
RBC # BLD AUTO: 5.2 M/UL (ref 4.2–5.9)
SODIUM SERPL-SCNC: 142 MMOL/L (ref 136–145)
WBC # BLD AUTO: 8.3 K/UL (ref 4–11)

## 2025-01-20 PROCEDURE — 85027 COMPLETE CBC AUTOMATED: CPT

## 2025-01-20 PROCEDURE — 02703ZZ DILATION OF CORONARY ARTERY, ONE ARTERY, PERCUTANEOUS APPROACH: ICD-10-PCS | Performed by: INTERNAL MEDICINE

## 2025-01-20 PROCEDURE — 80048 BASIC METABOLIC PNL TOTAL CA: CPT

## 2025-01-20 PROCEDURE — 93005 ELECTROCARDIOGRAM TRACING: CPT | Performed by: STUDENT IN AN ORGANIZED HEALTH CARE EDUCATION/TRAINING PROGRAM

## 2025-01-20 PROCEDURE — 85347 COAGULATION TIME ACTIVATED: CPT

## 2025-01-20 PROCEDURE — 6370000000 HC RX 637 (ALT 250 FOR IP): Performed by: INTERNAL MEDICINE

## 2025-01-20 PROCEDURE — 99152 MOD SED SAME PHYS/QHP 5/>YRS: CPT | Performed by: INTERNAL MEDICINE

## 2025-01-20 PROCEDURE — C1887 CATHETER, GUIDING: HCPCS | Performed by: INTERNAL MEDICINE

## 2025-01-20 PROCEDURE — 93005 ELECTROCARDIOGRAM TRACING: CPT | Performed by: INTERNAL MEDICINE

## 2025-01-20 PROCEDURE — C1725 CATH, TRANSLUMIN NON-LASER: HCPCS | Performed by: INTERNAL MEDICINE

## 2025-01-20 PROCEDURE — C1894 INTRO/SHEATH, NON-LASER: HCPCS | Performed by: INTERNAL MEDICINE

## 2025-01-20 PROCEDURE — 92920 PRQ TRLUML C ANGIOP 1ART&/BR: CPT | Performed by: INTERNAL MEDICINE

## 2025-01-20 PROCEDURE — 93459 L HRT ART/GRFT ANGIO: CPT | Performed by: INTERNAL MEDICINE

## 2025-01-20 PROCEDURE — 4A023N7 MEASUREMENT OF CARDIAC SAMPLING AND PRESSURE, LEFT HEART, PERCUTANEOUS APPROACH: ICD-10-PCS | Performed by: INTERNAL MEDICINE

## 2025-01-20 PROCEDURE — C1769 GUIDE WIRE: HCPCS | Performed by: INTERNAL MEDICINE

## 2025-01-20 PROCEDURE — 93458 L HRT ARTERY/VENTRICLE ANGIO: CPT | Performed by: INTERNAL MEDICINE

## 2025-01-20 PROCEDURE — 99153 MOD SED SAME PHYS/QHP EA: CPT | Performed by: INTERNAL MEDICINE

## 2025-01-20 PROCEDURE — 6360000004 HC RX CONTRAST MEDICATION: Performed by: INTERNAL MEDICINE

## 2025-01-20 PROCEDURE — B2111ZZ FLUOROSCOPY OF MULTIPLE CORONARY ARTERIES USING LOW OSMOLAR CONTRAST: ICD-10-PCS | Performed by: INTERNAL MEDICINE

## 2025-01-20 PROCEDURE — C1760 CLOSURE DEV, VASC: HCPCS | Performed by: INTERNAL MEDICINE

## 2025-01-20 PROCEDURE — 2709999900 HC NON-CHARGEABLE SUPPLY: Performed by: INTERNAL MEDICINE

## 2025-01-20 PROCEDURE — B2151ZZ FLUOROSCOPY OF LEFT HEART USING LOW OSMOLAR CONTRAST: ICD-10-PCS | Performed by: INTERNAL MEDICINE

## 2025-01-20 PROCEDURE — 2580000003 HC RX 258: Performed by: INTERNAL MEDICINE

## 2025-01-20 PROCEDURE — 93010 ELECTROCARDIOGRAM REPORT: CPT | Performed by: INTERNAL MEDICINE

## 2025-01-20 PROCEDURE — 6360000002 HC RX W HCPCS: Performed by: INTERNAL MEDICINE

## 2025-01-20 RX ORDER — SODIUM CHLORIDE 9 MG/ML
INJECTION, SOLUTION INTRAVENOUS PRN
Status: DISCONTINUED | OUTPATIENT
Start: 2025-01-20 | End: 2025-01-24 | Stop reason: HOSPADM

## 2025-01-20 RX ORDER — SODIUM CHLORIDE 0.9 % (FLUSH) 0.9 %
5-40 SYRINGE (ML) INJECTION EVERY 12 HOURS SCHEDULED
Status: DISCONTINUED | OUTPATIENT
Start: 2025-01-20 | End: 2025-01-24 | Stop reason: HOSPADM

## 2025-01-20 RX ORDER — SODIUM CHLORIDE 9 MG/ML
INJECTION, SOLUTION INTRAVENOUS CONTINUOUS PRN
Status: COMPLETED | OUTPATIENT
Start: 2025-01-20 | End: 2025-01-20

## 2025-01-20 RX ORDER — ATROPINE SULFATE 1 MG/ML
0.5 INJECTION, SOLUTION INTRAVENOUS
Status: DISPENSED | OUTPATIENT
Start: 2025-01-20 | End: 2025-01-21

## 2025-01-20 RX ORDER — INSULIN LISPRO 100 [IU]/ML
0-4 INJECTION, SOLUTION INTRAVENOUS; SUBCUTANEOUS
Status: DISCONTINUED | OUTPATIENT
Start: 2025-01-20 | End: 2025-01-24 | Stop reason: HOSPADM

## 2025-01-20 RX ORDER — 0.9 % SODIUM CHLORIDE 0.9 %
250 INTRAVENOUS SOLUTION INTRAVENOUS PRN
Status: DISCONTINUED | OUTPATIENT
Start: 2025-01-20 | End: 2025-01-24 | Stop reason: HOSPADM

## 2025-01-20 RX ORDER — METOPROLOL SUCCINATE 25 MG/1
12.5 TABLET, EXTENDED RELEASE ORAL 2 TIMES DAILY
Status: DISCONTINUED | OUTPATIENT
Start: 2025-01-20 | End: 2025-01-24 | Stop reason: HOSPADM

## 2025-01-20 RX ORDER — FENTANYL CITRATE 50 UG/ML
INJECTION, SOLUTION INTRAMUSCULAR; INTRAVENOUS PRN
Status: DISCONTINUED | OUTPATIENT
Start: 2025-01-20 | End: 2025-01-20 | Stop reason: HOSPADM

## 2025-01-20 RX ORDER — SODIUM CHLORIDE 9 MG/ML
INJECTION, SOLUTION INTRAVENOUS PRN
Status: DISCONTINUED | OUTPATIENT
Start: 2025-01-20 | End: 2025-01-20 | Stop reason: HOSPADM

## 2025-01-20 RX ORDER — EPTIFIBATIDE 2 MG/ML
INJECTION, SOLUTION INTRAVENOUS PRN
Status: DISCONTINUED | OUTPATIENT
Start: 2025-01-20 | End: 2025-01-20 | Stop reason: HOSPADM

## 2025-01-20 RX ORDER — ASPIRIN 325 MG
325 TABLET ORAL ONCE
Status: DISCONTINUED | OUTPATIENT
Start: 2025-01-20 | End: 2025-01-20 | Stop reason: HOSPADM

## 2025-01-20 RX ORDER — ATORVASTATIN CALCIUM 80 MG/1
80 TABLET, FILM COATED ORAL NIGHTLY
Status: DISCONTINUED | OUTPATIENT
Start: 2025-01-20 | End: 2025-01-24 | Stop reason: HOSPADM

## 2025-01-20 RX ORDER — GLUCAGON 1 MG/ML
1 KIT INJECTION PRN
Status: DISCONTINUED | OUTPATIENT
Start: 2025-01-20 | End: 2025-01-24 | Stop reason: HOSPADM

## 2025-01-20 RX ORDER — MIDAZOLAM HYDROCHLORIDE 1 MG/ML
INJECTION, SOLUTION INTRAMUSCULAR; INTRAVENOUS PRN
Status: DISCONTINUED | OUTPATIENT
Start: 2025-01-20 | End: 2025-01-20 | Stop reason: HOSPADM

## 2025-01-20 RX ORDER — PANTOPRAZOLE SODIUM 40 MG/1
40 TABLET, DELAYED RELEASE ORAL DAILY
Status: DISCONTINUED | OUTPATIENT
Start: 2025-01-21 | End: 2025-01-24 | Stop reason: HOSPADM

## 2025-01-20 RX ORDER — ACETAMINOPHEN 325 MG/1
650 TABLET ORAL EVERY 4 HOURS PRN
Status: DISCONTINUED | OUTPATIENT
Start: 2025-01-20 | End: 2025-01-24 | Stop reason: HOSPADM

## 2025-01-20 RX ORDER — SODIUM CHLORIDE 0.9 % (FLUSH) 0.9 %
5-40 SYRINGE (ML) INJECTION PRN
Status: DISCONTINUED | OUTPATIENT
Start: 2025-01-20 | End: 2025-01-24 | Stop reason: HOSPADM

## 2025-01-20 RX ORDER — NITROGLYCERIN 20 MG/100ML
INJECTION INTRAVENOUS CONTINUOUS PRN
Status: COMPLETED | OUTPATIENT
Start: 2025-01-20 | End: 2025-01-20

## 2025-01-20 RX ORDER — ASPIRIN 81 MG/1
81 TABLET ORAL DAILY
Status: DISCONTINUED | OUTPATIENT
Start: 2025-01-21 | End: 2025-01-24 | Stop reason: HOSPADM

## 2025-01-20 RX ORDER — HEPARIN SODIUM 1000 [USP'U]/ML
INJECTION, SOLUTION INTRAVENOUS; SUBCUTANEOUS PRN
Status: DISCONTINUED | OUTPATIENT
Start: 2025-01-20 | End: 2025-01-20 | Stop reason: HOSPADM

## 2025-01-20 RX ORDER — SODIUM CHLORIDE 0.9 % (FLUSH) 0.9 %
5-40 SYRINGE (ML) INJECTION EVERY 12 HOURS SCHEDULED
Status: DISCONTINUED | OUTPATIENT
Start: 2025-01-20 | End: 2025-01-20 | Stop reason: HOSPADM

## 2025-01-20 RX ORDER — INSULIN GLARGINE 100 [IU]/ML
18 INJECTION, SOLUTION SUBCUTANEOUS NIGHTLY
Status: DISCONTINUED | OUTPATIENT
Start: 2025-01-20 | End: 2025-01-24 | Stop reason: HOSPADM

## 2025-01-20 RX ORDER — IOPAMIDOL 755 MG/ML
INJECTION, SOLUTION INTRAVASCULAR PRN
Status: DISCONTINUED | OUTPATIENT
Start: 2025-01-20 | End: 2025-01-20 | Stop reason: HOSPADM

## 2025-01-20 RX ORDER — SODIUM CHLORIDE 0.9 % (FLUSH) 0.9 %
5-40 SYRINGE (ML) INJECTION PRN
Status: DISCONTINUED | OUTPATIENT
Start: 2025-01-20 | End: 2025-01-20 | Stop reason: HOSPADM

## 2025-01-20 RX ORDER — CLOPIDOGREL BISULFATE 75 MG/1
75 TABLET ORAL DAILY
Status: DISCONTINUED | OUTPATIENT
Start: 2025-01-21 | End: 2025-01-24 | Stop reason: HOSPADM

## 2025-01-20 RX ORDER — DEXTROSE MONOHYDRATE 100 MG/ML
INJECTION, SOLUTION INTRAVENOUS CONTINUOUS PRN
Status: DISCONTINUED | OUTPATIENT
Start: 2025-01-20 | End: 2025-01-24 | Stop reason: HOSPADM

## 2025-01-20 RX ADMIN — ACETAMINOPHEN 325MG 650 MG: 325 TABLET ORAL at 18:25

## 2025-01-20 RX ADMIN — ATORVASTATIN CALCIUM 80 MG: 80 TABLET, FILM COATED ORAL at 19:54

## 2025-01-20 RX ADMIN — INSULIN GLARGINE 18 UNITS: 100 INJECTION, SOLUTION SUBCUTANEOUS at 19:54

## 2025-01-20 RX ADMIN — INSULIN LISPRO 1 UNITS: 100 INJECTION, SOLUTION INTRAVENOUS; SUBCUTANEOUS at 19:54

## 2025-01-20 ASSESSMENT — PAIN SCALES - GENERAL
PAINLEVEL_OUTOF10: 3
PAINLEVEL_OUTOF10: 0

## 2025-01-20 ASSESSMENT — PAIN DESCRIPTION - ORIENTATION: ORIENTATION: MID

## 2025-01-20 ASSESSMENT — PAIN DESCRIPTION - DESCRIPTORS: DESCRIPTORS: ACHING

## 2025-01-20 ASSESSMENT — PAIN DESCRIPTION - LOCATION: LOCATION: HEAD

## 2025-01-20 NOTE — ANESTHESIA PRE-OP
Brief Pre-Op Note/Sedation Assessment      Landon Ordoñez  1975  [id number]  11:50 AM    Planned Procedure: Cardiac Catheterization Procedure  Post Procedure Plan: Return to same level of care  Consent: I have discussed with the patient and/or the patient representative the indication, alternatives, and the possible risks and/or complications of the planned procedure and the anesthesia methods. The patient and/or patient representative appear to understand and agree to proceed.        Chief Complaint:   Chest Pain/Pressure      Indications for Cath Procedure:  Presentation:  Worsening Angina  2.  Anginal Classification within 2 weeks:  CCS III - Symptoms with everyday living activities, i.e., moderate limitation  3.  Angina Symptoms Assessment:  Typical Chest Pain  4.  Heart Failure Class within last 2 weeks:  No symptoms  5.  Cardiovascular Instability:  No    Prior Ischemic Workup/Eval:  Pre-Procedural Medications: Yes: Aspirin, Beta Blockers, and STATIN  2.   Stress Test Completed?  Yes:  Stress or Imaging Studies Performed (within ANY time period):   Type:  Stress Nuclear  Results:  Positive:  Myocardial Perfusion Defects (Nuclear) Extent of Ischemia:  Intermediate    Does Patient need surgery?  Cath Valve Surgery:  No    Pre-Procedure Medical History:  Vital Signs:  BP (!) 136/95   Pulse 80   Temp 97.2 °F (36.2 °C)   Resp 21   Ht 1.676 m (5' 6\")   Wt 80.3 kg (177 lb)   SpO2 100%   BMI 28.57 kg/m²     Allergies:  No Known Allergies  Medications:    Current Facility-Administered Medications   Medication Dose Route Frequency Provider Last Rate Last Admin    sodium chloride flush 0.9 % injection 5-40 mL  5-40 mL IntraVENous 2 times per day Sam Mendoza MD        sodium chloride flush 0.9 % injection 5-40 mL  5-40 mL IntraVENous PRN Sam Mendoza MD        0.9 % sodium chloride infusion   IntraVENous PRN Sam Mendoza MD        aspirin tablet 325 mg  325 mg Oral Once Reji  for plan of sedation:   yes      Electronically signed by ALYSSA RAMOS MD on 1/20/2025 at 11:50 AM

## 2025-01-20 NOTE — PROGRESS NOTES
NAME:  Landon Ordoñez  YOB: 1975  MEDICAL RECORD NUMBER:  [id number]    Shift Summary: 7/2024: NSTEMI, LHC, multivessel disease. CABG and insertion of pacing wires.  1/20/2025: CP, worsening angina,  +stress test. CCL- multiple previous stents with occlusions. Refer to [facility] for other interventions?    Family updated: Yes:  brother in law    Rhythm: Normal Sinus Rhythm     Most recent vitals:   Visit Vitals  BP 95/66   Pulse 71   Temp 97.8 °F (36.6 °C) (Oral)   Resp 16   Ht 1.676 m (5' 6\")   Wt 80.3 kg (177 lb)   SpO2 100%   BMI 28.57 kg/m²           No data found.    No data found.      Respiratory support needed (if any):  - RA    Admission weight Weight - Scale: 80.3 kg (177 lb) (01/20/25 1019)    Today's weight    Wt Readings from Last 1 Encounters:   01/20/25 80.3 kg (177 lb)        Severino need assessed each shift: N/A - no severino present  UOP >30ml/hr: YES  Last documented BM (in last 48 hrs):  No data found.             Restraints (in use currently or dc'd in last 12 hrs): No    Order current and documentation up to date? No    Lines/Drains reviewed @ bedside.  Peripheral IV 01/20/25 Right;Anterior Forearm (Active)   Number of days: 0         Drip rates at handoff:    sodium chloride      dextrose         Lab Data:   CBC:   Recent Labs     01/20/25  1033   WBC 8.3   HGB 15.4   HCT 45.5   MCV 87.4        BMP:    Recent Labs     01/20/25  1033      K 4.1   CO2 22   BUN 16   CREATININE 0.8*     LIVR: No results for input(s): \"AST\", \"ALT\" in the last 72 hours.  PT/INR: No results for input(s): \"INR\" in the last 72 hours.    Invalid input(s): \"PROT\"  APTT: No results for input(s): \"APTT\" in the last 72 hours.  ABG: No results for input(s): \"PHART\", \"KVL9JQF\", \"PO2ART\" in the last 72 hours.    Any consults during the shift? No    Any signed and held orders to be released?  No        4 Eyes Skin Assessment       The patient is being assessed for  Shift Handoff    I agree that at least

## 2025-01-20 NOTE — PROGRESS NOTES
4 Eyes Skin Assessment     NAME:  Landon Ordoñez  YOB: 1975  MEDICAL RECORD NUMBER:  [id number]    The patient is being assessed for  Admission    I agree that at least one RN has performed a thorough Head to Toe Skin Assessment on the patient. ALL assessment sites listed below have been assessed.      Areas assessed by both nurses:    Head, Face, Ears, Shoulders, Back, Chest, Arms, Elbows, Hands, Sacrum. Buttock, Coccyx, Ischium, Legs. Feet and Heels, and Under Medical Devices         Does the Patient have a Wound? No noted wound(s)       Leonel Prevention initiated by RN: Yes  Wound Care Orders initiated by RN: No    Pressure Injury (Stage 3,4, Unstageable, DTI, NWPT, and Complex wounds) if present, place Wound referral order by RN under : No    New Ostomies, if present place, Ostomy referral order under : No     Nurse 1 eSignature: Electronically signed by Nelda Peters RN on 1/20/25 at 5:09 PM EST    **SHARE this note so that the co-signing nurse can place an eSignature**    Nurse 2 eSignature: {Esignature:[id number]}

## 2025-01-20 NOTE — H&P
CC: \"I still get some right sided CP.\"      HPI:  The patient is 49 y.o. male with a past medical history significant for transferring care to [facility]. 7/15/24 per Dr. Damon VILLAFUERTE and SOB and completed a [facility] s/p NSTEMI, ACS revealing multivessel disease and therefore referred for CABG. LVEF 35%. 7/17/24 he is s/p CABG x4 LIMA to LAD, SVG to OM2, SBG to PDA, SVG to DIAG. TCPB, AYSHA, doppler flow verification of bypass grafts with medistim probe 3.0, EVH of right saphenous vein, insertion of temporary atrial and ventricular pacing wires. 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      His last follow up with Denise EL with Cardiothoracic surgery 9/10/24 he reported feeling a wire to both sides of his lower abdomen, intermittent chest discomfort/pain to left chest. Uncontrolled glucose with recent start on Jardiance but has not started therapy at the time of the appointment. Follow up with Dr. Conti for pacing wire check. CXR 10/26/24 with no acute findings.      Today, he notes that if he does \"to much of anything, I get chest pain which makes it hard to breath.\" This is different from his angina. He is trying to increase his walking routine for exercise. He denies any nocturnal symptoms. Leg swelling have resolved. He will elevated his legs when seated. Notes off/on numbness from vein harvesting. He did not complete cardiac rehab. The patient admits to medical therapy compliance and feels heis tolerating. Patient denies exertional chest pain/pressure similar to his angina, dyspnea at rest, PATRICK similar to his angina, PND, orthopnea, palpitations, lightheadedness, weight changes,  BLE edema, and syncope.        Past Medical History        Past Medical History:   Diagnosis Date    CAD (coronary artery disease) 07/15/2024    Cardiomyopathy (HCC) 07/15/2024    Dyslipidemia 07/15/2024         Past Surgical History         Past Surgical History:   Procedure Laterality Date

## 2025-01-20 NOTE — PLAN OF CARE
Problem: Skin/Tissue Integrity  Goal: Absence of new skin breakdown  Description: 1.  Monitor for areas of redness and/or skin breakdown  2.  Assess vascular access sites hourly  3.  Every 4-6 hours minimum:  Change oxygen saturation probe site  4.  Every 4-6 hours:  If on nasal continuous positive airway pressure, respiratory therapy assess nares and determine need for appliance change or resting period.  1/20/2025 1758 by Nelda Peters, RN  Outcome: Progressing  1/20/2025 1754 by Nelda Peters, RN  Outcome: Progressing     Problem: ABCDS Injury Assessment  Goal: Absence of physical injury  1/20/2025 1758 by Nelda Peters, RN  Outcome: Progressing  1/20/2025 1754 by Nelda Peters, RN  Outcome: Progressing

## 2025-01-20 NOTE — PROGRESS NOTES
15:00 Bedside report received from cath lab RN, Pt arrived to 1310 post cath lab. Pt oriented and slightly drowsy. Pt right fem site clean dry intact, no hematoma or bruising, slightly swelling with no change. 96% on room air. HR slightly elevated, BP stable. Pt educated to lay flat for 2 hours post procedure. Pt oriented to room and educated on call light use. All questions answered.    15:15 Pt HR dropped to 40's and BP soft. Dr. Blackman notified of change. Fem site still intact.

## 2025-01-21 PROBLEM — R94.39 ABNORMAL NUCLEAR STRESS TEST: Status: ACTIVE | Noted: 2025-01-13

## 2025-01-21 LAB
ANION GAP SERPL CALCULATED.3IONS-SCNC: 10 MMOL/L (ref 3–16)
ANTI-XA UNFRAC HEPARIN: <0.1 IU/ML (ref 0.3–0.7)
BUN SERPL-MCNC: 18 MG/DL (ref 7–20)
CALCIUM SERPL-MCNC: 9.3 MG/DL (ref 8.3–10.6)
CHLORIDE SERPL-SCNC: 105 MMOL/L (ref 99–110)
CO2 SERPL-SCNC: 26 MMOL/L (ref 21–32)
CREAT SERPL-MCNC: 0.9 MG/DL (ref 0.9–1.3)
DEPRECATED RDW RBC AUTO: 14.8 % (ref 12.4–15.4)
EKG ATRIAL RATE: 79 BPM
EKG DIAGNOSIS: NORMAL
EKG P AXIS: 60 DEGREES
EKG P-R INTERVAL: 146 MS
EKG Q-T INTERVAL: 396 MS
EKG QRS DURATION: 86 MS
EKG QTC CALCULATION (BAZETT): 454 MS
EKG R AXIS: -62 DEGREES
EKG T AXIS: 94 DEGREES
EKG VENTRICULAR RATE: 79 BPM
GFR SERPLBLD CREATININE-BSD FMLA CKD-EPI: >90 ML/MIN/{1.73_M2}
GLUCOSE BLD-MCNC: 153 MG/DL (ref 70–99)
GLUCOSE BLD-MCNC: 158 MG/DL (ref 70–99)
GLUCOSE BLD-MCNC: 183 MG/DL (ref 70–99)
GLUCOSE BLD-MCNC: 210 MG/DL (ref 70–99)
GLUCOSE SERPL-MCNC: 172 MG/DL (ref 70–99)
HCT VFR BLD AUTO: 38.1 % (ref 40.5–52.5)
HGB BLD-MCNC: 13 G/DL (ref 13.5–17.5)
INR PPP: 0.97 (ref 0.85–1.15)
MCH RBC QN AUTO: 29.5 PG (ref 26–34)
MCHC RBC AUTO-ENTMCNC: 34.1 G/DL (ref 31–36)
MCV RBC AUTO: 86.6 FL (ref 80–100)
PERFORMED ON: ABNORMAL
PLATELET # BLD AUTO: 240 K/UL (ref 135–450)
PMV BLD AUTO: 9.9 FL (ref 5–10.5)
POTASSIUM SERPL-SCNC: 4.2 MMOL/L (ref 3.5–5.1)
PROTHROMBIN TIME: 13.1 SEC (ref 11.9–14.9)
RBC # BLD AUTO: 4.4 M/UL (ref 4.2–5.9)
SODIUM SERPL-SCNC: 141 MMOL/L (ref 136–145)
WBC # BLD AUTO: 12.1 K/UL (ref 4–11)

## 2025-01-21 PROCEDURE — 85520 HEPARIN ASSAY: CPT

## 2025-01-21 PROCEDURE — 85027 COMPLETE CBC AUTOMATED: CPT

## 2025-01-21 PROCEDURE — 6370000000 HC RX 637 (ALT 250 FOR IP): Performed by: INTERNAL MEDICINE

## 2025-01-21 PROCEDURE — 94760 N-INVAS EAR/PLS OXIMETRY 1: CPT

## 2025-01-21 PROCEDURE — 6360000002 HC RX W HCPCS: Performed by: INTERNAL MEDICINE

## 2025-01-21 PROCEDURE — 99233 SBSQ HOSP IP/OBS HIGH 50: CPT | Performed by: INTERNAL MEDICINE

## 2025-01-21 PROCEDURE — 80048 BASIC METABOLIC PNL TOTAL CA: CPT

## 2025-01-21 PROCEDURE — 2060000000 HC ICU INTERMEDIATE R&B

## 2025-01-21 PROCEDURE — 93010 ELECTROCARDIOGRAM REPORT: CPT | Performed by: STUDENT IN AN ORGANIZED HEALTH CARE EDUCATION/TRAINING PROGRAM

## 2025-01-21 PROCEDURE — 2500000003 HC RX 250 WO HCPCS: Performed by: INTERNAL MEDICINE

## 2025-01-21 PROCEDURE — 85610 PROTHROMBIN TIME: CPT

## 2025-01-21 PROCEDURE — 36415 COLL VENOUS BLD VENIPUNCTURE: CPT

## 2025-01-21 RX ORDER — HEPARIN SODIUM 1000 [USP'U]/ML
4000 INJECTION, SOLUTION INTRAVENOUS; SUBCUTANEOUS PRN
Status: DISCONTINUED | OUTPATIENT
Start: 2025-01-21 | End: 2025-01-21 | Stop reason: CLARIF

## 2025-01-21 RX ORDER — HEPARIN SODIUM 1000 [USP'U]/ML
2000 INJECTION, SOLUTION INTRAVENOUS; SUBCUTANEOUS PRN
Status: DISCONTINUED | OUTPATIENT
Start: 2025-01-21 | End: 2025-01-21 | Stop reason: CLARIF

## 2025-01-21 RX ORDER — HEPARIN SODIUM 10000 [USP'U]/100ML
0-4000 INJECTION, SOLUTION INTRAVENOUS CONTINUOUS
Status: DISCONTINUED | OUTPATIENT
Start: 2025-01-21 | End: 2025-01-22

## 2025-01-21 RX ADMIN — CLOPIDOGREL BISULFATE 75 MG: 75 TABLET ORAL at 09:02

## 2025-01-21 RX ADMIN — INSULIN LISPRO 1 UNITS: 100 INJECTION, SOLUTION INTRAVENOUS; SUBCUTANEOUS at 07:42

## 2025-01-21 RX ADMIN — INSULIN GLARGINE 18 UNITS: 100 INJECTION, SOLUTION SUBCUTANEOUS at 20:35

## 2025-01-21 RX ADMIN — METFORMIN HYDROCHLORIDE 500 MG: 500 TABLET, FILM COATED ORAL at 17:15

## 2025-01-21 RX ADMIN — INSULIN LISPRO 1 UNITS: 100 INJECTION, SOLUTION INTRAVENOUS; SUBCUTANEOUS at 09:03

## 2025-01-21 RX ADMIN — SODIUM CHLORIDE, PRESERVATIVE FREE 10 ML: 5 INJECTION INTRAVENOUS at 20:43

## 2025-01-21 RX ADMIN — ACETAMINOPHEN 325MG 650 MG: 325 TABLET ORAL at 15:05

## 2025-01-21 RX ADMIN — EMPAGLIFLOZIN 10 MG: 10 TABLET, FILM COATED ORAL at 09:02

## 2025-01-21 RX ADMIN — HEPARIN SODIUM 940 UNITS/HR: 10000 INJECTION, SOLUTION INTRAVENOUS at 20:48

## 2025-01-21 RX ADMIN — SODIUM CHLORIDE, PRESERVATIVE FREE 10 ML: 5 INJECTION INTRAVENOUS at 09:03

## 2025-01-21 RX ADMIN — METFORMIN HYDROCHLORIDE 500 MG: 500 TABLET, FILM COATED ORAL at 09:02

## 2025-01-21 RX ADMIN — METOPROLOL SUCCINATE 12.5 MG: 25 TABLET, EXTENDED RELEASE ORAL at 20:41

## 2025-01-21 RX ADMIN — ASPIRIN 81 MG: 81 TABLET, COATED ORAL at 09:02

## 2025-01-21 RX ADMIN — PANTOPRAZOLE SODIUM 40 MG: 40 TABLET, DELAYED RELEASE ORAL at 09:02

## 2025-01-21 RX ADMIN — ATORVASTATIN CALCIUM 80 MG: 80 TABLET, FILM COATED ORAL at 20:36

## 2025-01-21 ASSESSMENT — PAIN DESCRIPTION - FREQUENCY: FREQUENCY: CONTINUOUS

## 2025-01-21 ASSESSMENT — PAIN DESCRIPTION - LOCATION
LOCATION: ABDOMEN
LOCATION: ABDOMEN

## 2025-01-21 ASSESSMENT — PAIN DESCRIPTION - PAIN TYPE: TYPE: ACUTE PAIN

## 2025-01-21 ASSESSMENT — PAIN SCALES - GENERAL
PAINLEVEL_OUTOF10: 3
PAINLEVEL_OUTOF10: 0
PAINLEVEL_OUTOF10: 3
PAINLEVEL_OUTOF10: 0
PAINLEVEL_OUTOF10: 4

## 2025-01-21 ASSESSMENT — PAIN DESCRIPTION - ONSET: ONSET: ON-GOING

## 2025-01-21 ASSESSMENT — PAIN - FUNCTIONAL ASSESSMENT: PAIN_FUNCTIONAL_ASSESSMENT: ACTIVITIES ARE NOT PREVENTED

## 2025-01-21 ASSESSMENT — PAIN DESCRIPTION - DESCRIPTORS
DESCRIPTORS: CRAMPING
DESCRIPTORS: DULL

## 2025-01-21 ASSESSMENT — PAIN DESCRIPTION - ORIENTATION
ORIENTATION: LOWER
ORIENTATION: RIGHT

## 2025-01-21 NOTE — PLAN OF CARE
Problem: Skin/Tissue Integrity  Goal: Absence of new skin breakdown  Description: 1.  Monitor for areas of redness and/or skin breakdown  2.  Assess vascular access sites hourly  3.  Every 4-6 hours minimum:  Change oxygen saturation probe site  4.  Every 4-6 hours:  If on nasal continuous positive airway pressure, respiratory therapy assess nares and determine need for appliance change or resting period.  1/21/2025 1149 by Yenifer Richards RN  Outcome: Progressing  1/21/2025 0531 by Narayan Hernández RN  Outcome: Progressing     Problem: ABCDS Injury Assessment  Goal: Absence of physical injury  1/21/2025 1149 by Yenifer Richards RN  Outcome: Progressing  1/21/2025 0531 by Narayan Hernández RN  Outcome: Progressing     Problem: Discharge Planning  Goal: Discharge to home or other facility with appropriate resources  1/21/2025 1149 by Yenifer Richards RN  Outcome: Progressing  1/21/2025 0531 by Narayan Hernández RN  Outcome: Progressing  Flowsheets (Taken 1/21/2025 0438)  Discharge to home or other facility with appropriate resources: Identify barriers to discharge with patient and caregiver

## 2025-01-21 NOTE — PROGRESS NOTES
4 Eyes Skin Assessment     NAME:  Landon Ordoñez  YOB: 1975  MEDICAL RECORD NUMBER:  [id number]    The patient is being assessed for  Transfer to New Unit    I agree that at least one RN has performed a thorough Head to Toe Skin Assessment on the patient. ALL assessment sites listed below have been assessed.      Areas assessed by both nurses:    Head, Face, Ears, Shoulders, Back, Chest, Arms, Elbows, Hands, Sacrum. Buttock, Coccyx, Ischium, Legs. Feet and Heels, and Under Medical Devices         Does the Patient have a Wound? No noted wound(s)       Leonel Prevention initiated by RN: Yes  Wound Care Orders initiated by RN: No    Pressure Injury (Stage 3,4, Unstageable, DTI, NWPT, and Complex wounds) if present, place Wound referral order by RN under : No    New Ostomies, if present place, Ostomy referral order under : No     Nurse 1 eSignature: Electronically signed by Narayan Hernández RN on 1/21/25 at 5:16 AM EST    **SHARE this note so that the co-signing nurse can place an eSignature**    Nurse 2 eSignature: Electronically signed by Rubia Locke RN on 1/21/25 at 6:13 AM EST

## 2025-01-21 NOTE — PROGRESS NOTES
Patient transferred to room 5116 @ [facility]. A&O x4, oriented to surroundings. Skin warm and dry. Puncture site to R groin intact, bruising to site, mild swelling, no hematoma. C/o mild discomfort to lower B abdomen area. In bed, call light in reach.

## 2025-01-21 NOTE — PROGRESS NOTES
[facility]   Daily Progress Note      Admit Date:  1/20/2025    CC: \"   The patient is 49 y.o. male with a past medical history significant for transferring care to [facility]. 7/15/24 per Dr. Damon VILLAFUERTE and SOB and completed a [facility] s/p NSTEMI, ACS revealing multivessel disease and therefore referred for CABG. LVEF 35%. 7/17/24 he is s/p CABG x4 LIMA to LAD, SVG to OM2, SBG to PDA, SVG to DIAG. TCPB, AYSHA, doppler flow verification of bypass grafts with medistim probe 3.0, EVH of right saphenous vein, insertion of temporary atrial and ventricular pacing wires. 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      His last follow up with Denise EL with Cardiothoracic surgery 9/10/24 he reported feeling a wire to both sides of his lower abdomen, intermittent chest discomfort/pain to left chest. Uncontrolled glucose with recent start on Jardiance but has not started therapy at the time of the appointment. Follow up with Dr. Conti for pacing wire check. CXR 10/26/24 with no acute findings.      Today, he notes that if he does \"to much of anything, I get chest pain which makes it hard to breath.\" This is different from his angina. He is trying to increase his walking routine for exercise. He denies any nocturnal symptoms. Leg swelling have resolved. He will elevated his legs when seated. Notes off/on numbness from vein harvesting. He did not complete cardiac rehab. The patient admits to medical therapy compliance and feels heis tolerating. Patient denies exertional chest pain/pressure similar to his angina, dyspnea at rest, PATRICK similar to his angina, PND, orthopnea, palpitations, lightheadedness, weight changes,  BLE edema, and syncope      Interval history 1/21/2025  Patient underwent coronary angiography on 1/20/2025 by Dr. 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

## 2025-01-21 NOTE — PROGRESS NOTES
Acknowledged order as written by Dr. Luevano:   Nursing communication Routine, ONE TIME, on Tue 1/21/25 @ 4001, For 1 occurrence START HEPARIN 2 hours. No order for Heparin placed by MD. This RN called [facility] for further clarification. Discussed order with MD on call. Awaiting further advice and or clarification on order set. Electronically signed by Yenifer Richards RN on 1/21/2025 at 6:07 PM     On call MD clarified order. Pharmacy is currently working on the Heparin order. On coming RN advised. Electronically signed by Yenifer Richards RN on 1/21/2025 at 6:51 PM

## 2025-01-21 NOTE — PLAN OF CARE
Problem: Skin/Tissue Integrity  Goal: Absence of new skin breakdown  Description: 1.  Monitor for areas of redness and/or skin breakdown  2.  Assess vascular access sites hourly  3.  Every 4-6 hours minimum:  Change oxygen saturation probe site  4.  Every 4-6 hours:  If on nasal continuous positive airway pressure, respiratory therapy assess nares and determine need for appliance change or resting period.  1/21/2025 0531 by Narayan Hernández, RN  Outcome: Progressing     Problem: ABCDS Injury Assessment  Goal: Absence of physical injury  1/21/2025 0531 by Narayan Hernández, RN  Outcome: Progressing     Problem: Discharge Planning  Goal: Discharge to home or other facility with appropriate resources  Outcome: Progressing  Flowsheets (Taken 1/21/2025 0439)  Discharge to home or other facility with appropriate resources: Identify barriers to discharge with patient and caregiver

## 2025-01-22 ENCOUNTER — APPOINTMENT (OUTPATIENT)
Dept: CT IMAGING | Age: 50
End: 2025-01-22
Attending: INTERNAL MEDICINE
Payer: COMMERCIAL

## 2025-01-22 LAB
ANION GAP SERPL CALCULATED.3IONS-SCNC: 9 MMOL/L (ref 3–16)
ANTI-XA UNFRAC HEPARIN: 0.16 IU/ML (ref 0.3–0.7)
ANTI-XA UNFRAC HEPARIN: 0.17 IU/ML (ref 0.3–0.7)
ANTI-XA UNFRAC HEPARIN: 0.25 IU/ML (ref 0.3–0.7)
ANTI-XA UNFRAC HEPARIN: 0.29 IU/ML (ref 0.3–0.7)
APTT BLD: 44.2 SEC (ref 22.1–36.4)
BUN SERPL-MCNC: 15 MG/DL (ref 7–20)
CALCIUM SERPL-MCNC: 8.8 MG/DL (ref 8.3–10.6)
CHLORIDE SERPL-SCNC: 105 MMOL/L (ref 99–110)
CO2 SERPL-SCNC: 26 MMOL/L (ref 21–32)
CREAT SERPL-MCNC: 0.8 MG/DL (ref 0.9–1.3)
DEPRECATED RDW RBC AUTO: 14.3 % (ref 12.4–15.4)
ECHO BSA: 1.93 M2
GFR SERPLBLD CREATININE-BSD FMLA CKD-EPI: >90 ML/MIN/{1.73_M2}
GLUCOSE BLD-MCNC: 154 MG/DL (ref 70–99)
GLUCOSE BLD-MCNC: 204 MG/DL (ref 70–99)
GLUCOSE BLD-MCNC: 247 MG/DL (ref 70–99)
GLUCOSE SERPL-MCNC: 107 MG/DL (ref 70–99)
HCT VFR BLD AUTO: 32.1 % (ref 40.5–52.5)
HCT VFR BLD AUTO: 33.3 % (ref 40.5–52.5)
HGB BLD-MCNC: 11.1 G/DL (ref 13.5–17.5)
HGB BLD-MCNC: 11.5 G/DL (ref 13.5–17.5)
MCH RBC QN AUTO: 29.9 PG (ref 26–34)
MCHC RBC AUTO-ENTMCNC: 34.4 G/DL (ref 31–36)
MCV RBC AUTO: 86.7 FL (ref 80–100)
PERFORMED ON: ABNORMAL
PLATELET # BLD AUTO: 189 K/UL (ref 135–450)
PMV BLD AUTO: 9.3 FL (ref 5–10.5)
POTASSIUM SERPL-SCNC: 3.8 MMOL/L (ref 3.5–5.1)
RBC # BLD AUTO: 3.84 M/UL (ref 4.2–5.9)
SODIUM SERPL-SCNC: 140 MMOL/L (ref 136–145)
WBC # BLD AUTO: 10.9 K/UL (ref 4–11)

## 2025-01-22 PROCEDURE — 6370000000 HC RX 637 (ALT 250 FOR IP): Performed by: INTERNAL MEDICINE

## 2025-01-22 PROCEDURE — 99233 SBSQ HOSP IP/OBS HIGH 50: CPT | Performed by: INTERNAL MEDICINE

## 2025-01-22 PROCEDURE — 85027 COMPLETE CBC AUTOMATED: CPT

## 2025-01-22 PROCEDURE — 85730 THROMBOPLASTIN TIME PARTIAL: CPT

## 2025-01-22 PROCEDURE — 85018 HEMOGLOBIN: CPT

## 2025-01-22 PROCEDURE — 36415 COLL VENOUS BLD VENIPUNCTURE: CPT

## 2025-01-22 PROCEDURE — 80048 BASIC METABOLIC PNL TOTAL CA: CPT

## 2025-01-22 PROCEDURE — 2100000000 HC CCU R&B

## 2025-01-22 PROCEDURE — 74174 CTA ABD&PLVS W/CONTRAST: CPT

## 2025-01-22 PROCEDURE — 85014 HEMATOCRIT: CPT

## 2025-01-22 PROCEDURE — 94760 N-INVAS EAR/PLS OXIMETRY 1: CPT

## 2025-01-22 PROCEDURE — 2500000003 HC RX 250 WO HCPCS: Performed by: INTERNAL MEDICINE

## 2025-01-22 PROCEDURE — 85520 HEPARIN ASSAY: CPT

## 2025-01-22 PROCEDURE — 6360000004 HC RX CONTRAST MEDICATION: Performed by: INTERNAL MEDICINE

## 2025-01-22 PROCEDURE — 6360000002 HC RX W HCPCS: Performed by: INTERNAL MEDICINE

## 2025-01-22 RX ORDER — IOPAMIDOL 755 MG/ML
75 INJECTION, SOLUTION INTRAVASCULAR
Status: COMPLETED | OUTPATIENT
Start: 2025-01-22 | End: 2025-01-22

## 2025-01-22 RX ORDER — SODIUM CHLORIDE 0.9 % (FLUSH) 0.9 %
5-40 SYRINGE (ML) INJECTION EVERY 12 HOURS SCHEDULED
Status: DISCONTINUED | OUTPATIENT
Start: 2025-01-22 | End: 2025-01-24 | Stop reason: HOSPADM

## 2025-01-22 RX ORDER — SODIUM CHLORIDE 9 MG/ML
INJECTION, SOLUTION INTRAVENOUS PRN
Status: DISCONTINUED | OUTPATIENT
Start: 2025-01-22 | End: 2025-01-22 | Stop reason: HOSPADM

## 2025-01-22 RX ORDER — SODIUM CHLORIDE 0.9 % (FLUSH) 0.9 %
5-40 SYRINGE (ML) INJECTION PRN
Status: DISCONTINUED | OUTPATIENT
Start: 2025-01-22 | End: 2025-01-24 | Stop reason: HOSPADM

## 2025-01-22 RX ORDER — SODIUM CHLORIDE 0.9 % (FLUSH) 0.9 %
5-40 SYRINGE (ML) INJECTION PRN
Status: DISCONTINUED | OUTPATIENT
Start: 2025-01-22 | End: 2025-01-22

## 2025-01-22 RX ORDER — HEPARIN SODIUM 1000 [USP'U]/ML
2000 INJECTION, SOLUTION INTRAVENOUS; SUBCUTANEOUS ONCE
Status: COMPLETED | OUTPATIENT
Start: 2025-01-22 | End: 2025-01-22

## 2025-01-22 RX ORDER — SODIUM CHLORIDE 9 MG/ML
INJECTION, SOLUTION INTRAVENOUS PRN
Status: DISCONTINUED | OUTPATIENT
Start: 2025-01-22 | End: 2025-01-24 | Stop reason: HOSPADM

## 2025-01-22 RX ORDER — ACETAMINOPHEN 325 MG/1
650 TABLET ORAL EVERY 4 HOURS PRN
Status: DISCONTINUED | OUTPATIENT
Start: 2025-01-22 | End: 2025-01-22 | Stop reason: SDUPTHER

## 2025-01-22 RX ORDER — SODIUM CHLORIDE 0.9 % (FLUSH) 0.9 %
5-40 SYRINGE (ML) INJECTION EVERY 12 HOURS SCHEDULED
Status: DISCONTINUED | OUTPATIENT
Start: 2025-01-22 | End: 2025-01-22

## 2025-01-22 RX ADMIN — HEPARIN SODIUM 2000 UNITS: 1000 INJECTION INTRAVENOUS; SUBCUTANEOUS at 11:40

## 2025-01-22 RX ADMIN — INSULIN LISPRO 1 UNITS: 100 INJECTION, SOLUTION INTRAVENOUS; SUBCUTANEOUS at 20:55

## 2025-01-22 RX ADMIN — METOPROLOL SUCCINATE 12.5 MG: 25 TABLET, EXTENDED RELEASE ORAL at 08:53

## 2025-01-22 RX ADMIN — INSULIN GLARGINE 18 UNITS: 100 INJECTION, SOLUTION SUBCUTANEOUS at 20:38

## 2025-01-22 RX ADMIN — HEPARIN SODIUM 2000 UNITS: 1000 INJECTION INTRAVENOUS; SUBCUTANEOUS at 04:15

## 2025-01-22 RX ADMIN — IOPAMIDOL 75 ML: 755 INJECTION, SOLUTION INTRAVENOUS at 15:52

## 2025-01-22 RX ADMIN — CLOPIDOGREL BISULFATE 75 MG: 75 TABLET ORAL at 08:52

## 2025-01-22 RX ADMIN — METOPROLOL SUCCINATE 12.5 MG: 25 TABLET, EXTENDED RELEASE ORAL at 20:37

## 2025-01-22 RX ADMIN — ASPIRIN 81 MG: 81 TABLET, COATED ORAL at 08:52

## 2025-01-22 RX ADMIN — ATORVASTATIN CALCIUM 80 MG: 80 TABLET, FILM COATED ORAL at 20:37

## 2025-01-22 RX ADMIN — SODIUM CHLORIDE, PRESERVATIVE FREE 10 ML: 5 INJECTION INTRAVENOUS at 20:56

## 2025-01-22 NOTE — PROGRESS NOTES
4 Eyes Skin Assessment     NAME:  Landon Ordoñez  YOB: 1975  MEDICAL RECORD NUMBER:  [id number]    The patient is being assessed for  Cath Lab Post-Op    I agree that at least one RN has performed a thorough Head to Toe Skin Assessment on the patient. ALL assessment sites listed below have been assessed.      Areas assessed by both nurses:    Head, Face, Ears, Shoulders, Back, Chest, Arms, Elbows, Hands, Sacrum. Buttock, Coccyx, Ischium, and Legs. Feet and Heels        Does the Patient have a Wound? No noted wound(s)       Leonel Prevention initiated by RN: No  Wound Care Orders initiated by RN: No    Pressure Injury (Stage 3,4, Unstageable, DTI, NWPT, and Complex wounds) if present, place Wound referral order by RN under : No    New Ostomies, if present place, Ostomy referral order under : No     Nurse 1 eSignature: Electronically signed by Ivonne Chan RN on 1/22/25 at 6:36 AM EST    **SHARE this note so that the co-signing nurse can place an eSignature**    Nurse 2 eSignature: Electronically signed by Dayami Doshi RN on 1/22/25 at 6:41 AM EST

## 2025-01-22 NOTE — PROGRESS NOTES
Patient upset no doctor has been to see him today and is frustrated not knowing plan of care. Second call to [facility] placed and spoke with RN alexandra. Dr. Pulido to hopefully round on patient. Patient still on schedule board for later today. Updated patient, upset but verbalized understanding.     Electronically signed by Bouchra Joseph RN on 1/22/2025 at 2:55 PM

## 2025-01-22 NOTE — PROGRESS NOTES
Patient during shift handoff reported arm swelling near a recent lab stick. This RN assessed, raised area noted few centimeters below apparent lab stick. Soft and non-tender to touch. Patient observed forcefully massaging it. Patient to let RN know if it starts to hurt.    Patient now hits call light to let this RN know it hurts. Area unchanged but ice applied, still soft and nontender to palpation.     Electronically signed by Bouchra Joseph RN on 1/22/2025 at 8:10 AM

## 2025-01-22 NOTE — PROGRESS NOTES
Late entry due to patient care    This RN called to bedside as patient removed ace wrap to L arm and stated it was bleeding. RN assessed and red/purple bruising noted to forearm/bicep. Patient reporting increased pain to palpation. Additional RN to bedside to assess, pressure held and patient cx of intense pain. Cardio on-call called. Spoke to Josecollins at 1816. Ran through labs and plan of care for patient. Notified her of them not doing procedure and doing a CTA instead to rule out bleed. While checking labs, this RN saw results from CTA showing a RP hematoma. Heparin gtt immediately stopped at 1825, reflected in eMAR. STAT lab orders placed and requesting STAT consult to Gen Surg. Groin site still soft and non tender. No bruising to side or abd. Patient reports abd  to palpation but improving. BP checked and 109/72. No cx of dizziness or headaches. Dr. Beckford called this RN back, stated this is more Vascular issue. CVU Rns at bedside to transfer patient as they spoke with Chloé and prefers patient to be transferred for closer monitoring. CVU RN stated they will ask Chloé about any further consults. Report given. All belongings sent with patient, transported off unit via wheelchair, ACC Landon to send chart via tube system. RN offered to call family. Patient to 1304. Supervisor and charge RN updated.     Electronically signed by Bouchra Joseph RN on 1/22/2025 at 7:03 PM

## 2025-01-22 NOTE — PROGRESS NOTES
Patient was cx of discomfort this morning, described it as more \"irritable bowel syndrome\" and cramping more than pain. R groin palpated without cx of tenderness. Soft and dressing as in place. Need to see when procedure was to remove dressing. Patient to be going to cath lab this afternoon. Stating he is very hungry, apple sauce given this morning.     Electronically signed by Bouchra Joseph RN on 1/22/2025 at 9:45 AM

## 2025-01-22 NOTE — PROGRESS NOTES
[facility]  Cardiology Consult    Landon Ordoñez  1975 January 22, 2025      Reason for Referral: Coronary artery disease      CC: ***      Subjective:     History of Present Illness:    Landon Ordoñez is a 49 y.o. patient with a PMH significant for coronary artery disease s/p CABG 7/2024, hypertension and hyperlipidemia.       Past Medical History:   has a past medical history of CAD (coronary artery disease), Cardiomyopathy (HCC), and Dyslipidemia.    Surgical History:   has a past surgical history that includes Cardiac procedure (N/A, 7/15/2024); Cardiac procedure (N/A, 7/15/2024); Coronary artery bypass graft (N/A, 7/17/2024); Cardiac procedure (N/A, 1/20/2025); and Cardiac procedure (N/A, 1/20/2025).     Social History:   reports that he has never smoked. He does not have any smokeless tobacco history on file. He reports current alcohol use. He reports that he does not use drugs.     Family History:  family history includes Diabetes in his father and mother; Heart Disease in his father and mother.    Home Medications:  Were reviewed and are listed in nursing record and/or below  Prior to Admission medications    Medication Sig Start Date End Date Taking? Authorizing Provider   ASPIRIN LOW DOSE 81 MG EC tablet TAKE 1 TABLET BY MOUTH EVERY DAY 11/22/24  Yes Srini Pulido MD   JARDIANCE 10 MG tablet Take 1 tablet by mouth every morning   Yes ProviderDonald MD   insulin glargine (LANTUS) 100 UNIT/ML injection vial Inject 18 Units into the skin daily Okay to change to pens 7/24/24  Yes Rogerio Mejia PA-C   metFORMIN (GLUCOPHAGE) 500 MG tablet Take 1 tablet by mouth 2 times daily (with meals) 7/23/24  Yes Rogerio Mejia PA-C   atorvastatin (LIPITOR) 80 MG tablet Take 1 tablet by mouth nightly 7/23/24  Yes Rogerio Mejia PA-C   metoprolol succinate (TOPROL XL) 25 MG extended release tablet Take 0.5 tablets by mouth in the morning and at bedtime 7/23/24  Yes Rogerio Mejia PA-C

## 2025-01-22 NOTE — FLOWSHEET NOTE
Patient arrived to pre/post cath lab area alert and oriented. Heparin drip infusing at 1260 units/hour. Patient complains of pain across lower abdomen which he states \" I feel like I have irritable bowel syndrome.\" Patient has soreness at right groin site when palpated.   .

## 2025-01-22 NOTE — PROGRESS NOTES
Clinical Pharmacy Note  Heparin Dosing Consult    Landon Ordoñez is a 49 y.o. male ordered heparin per CAD/STEMI/NSTEMI/UA/AFIB nomogram by Dr. Ortiz.     Lab Results   Component Value Date/Time    ANTIXAUHEP <0.10 01/21/2025 07:10 PM      Lab Results   Component Value Date/Time    HGB 13.0 01/21/2025 05:52 AM    HCT 38.1 01/21/2025 05:52 AM     01/21/2025 05:52 AM    INR 0.97 01/21/2025 07:10 PM       Ht Readings from Last 1 Encounters:   01/20/25 1.676 m (5' 6\")        Wt Readings from Last 1 Encounters:   01/21/25 78 kg (171 lb 15.3 oz)        Assessment/Plan:  Initial infusion rate: 940 units/hr  Next anti-Xa:: 6 hours after infusion started    Pharmacy will continue to monitor adjust heparin based on anti-Xa results using nomogram below:     CAD/STEMI/NSTEMI/UA/AFIB Heparin Nomogram     Initial Bolus: 60 units/kg Max Bolus: 4,000 units       Initial Rate: 12 units/kg/hr Max Initial Rate: 1,000 units/hr     anti-Xa Bolus Titration   < 0.1 Heparin 60 units/kg bolus Increase drip by 4 units/kg/hr   0.1 - 0.29 Heparin 30 units/kg bolus Increase drip by 2 units/kg/hr   0.3 - 0.7 No Bolus No Change   0.71 - 0.8 No Bolus Decrease drip by 1 units/kg/hr   0.81 - 0.99 No Bolus Decrease drip by 2 units/kg/hr   > 1 Hold Heparin for 1 hour Decrease drip by 3 units/kg/hr     Obtain anti-Xa 6 hours after initial bolus and 6 hours after any dose change until two consecutive therapeutic anti-Xa levels are achieved - then daily.

## 2025-01-22 NOTE — PROGRESS NOTES
[facility]   Daily Progress Note      Admit Date:  1/20/2025    CC: \"   The patient is 49 y.o. male with a past medical history significant for transferring care to [facility]. 7/15/24 per Dr. Damon VILLAFUERTE and SOB and completed a [facility] s/p NSTEMI, ACS revealing multivessel disease and therefore referred for CABG. LVEF 35%. 7/17/24 he is s/p CABG x4 LIMA to LAD, SVG to OM2, SBG to PDA, SVG to DIAG. TCPB, AYSHA, doppler flow verification of bypass grafts with medistim probe 3.0, EVH of right saphenous vein, insertion of temporary atrial and ventricular pacing wires. 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      His last follow up with Denise EL with Cardiothoracic surgery 9/10/24 he reported feeling a wire to both sides of his lower abdomen, intermittent chest discomfort/pain to left chest. Uncontrolled glucose with recent start on Jardiance but has not started therapy at the time of the appointment. Follow up with Dr. Conti for pacing wire check. CXR 10/26/24 with no acute findings.      Today, he notes that if he does \"to much of anything, I get chest pain which makes it hard to breath.\" This is different from his angina. He is trying to increase his walking routine for exercise. He denies any nocturnal symptoms. Leg swelling have resolved. He will elevated his legs when seated. Notes off/on numbness from vein harvesting. He did not complete cardiac rehab. The patient admits to medical therapy compliance and feels heis tolerating. Patient denies exertional chest pain/pressure similar to his angina, dyspnea at rest, PATRICK similar to his angina, PND, orthopnea, palpitations, lightheadedness, weight changes,  BLE edema, and syncope      Interval history 1/21/2025  Patient underwent coronary angiography on 1/20/2025 by Dr. 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  Will schedule patient for a stat CAT scan of abdomen and pelvis to make sure there is no evidence of bleeding before proceeding  -Continue patient on DAPT, statin and beta-blocker therapy    Hyperlipidemia\  Patient on high-dose statin therapy      Total visit time > 45 minutes; > 50% spend counseling / coordinating care. I reviewed interval history, physical exam, review of data including labs, imaging, development and implementation of treatment plan and coordination of complex care. Counseled on risk factor modifications.      Electronically signed by Srini Pulido MD on 1/22/2025 at 4:48 PM

## 2025-01-22 NOTE — PROGRESS NOTES
Clinical Pharmacy Note  Heparin Dosing       Lab Results   Component Value Date/Time    ANTIXAUHEP 0.16 01/22/2025 02:51 AM      Lab Results   Component Value Date/Time    HGB 13.0 01/21/2025 05:52 AM    HCT 38.1 01/21/2025 05:52 AM     01/21/2025 05:52 AM    INR 0.97 01/21/2025 07:10 PM       Current Infusion Rate: 940 units/hr    Plan:  Bolus: 2000 units  Rate: 1100 units/hr  Next anti-Xa level: 1000 01/22/25    Pharmacy will continue to monitor and adjust based on anti-Xa results.    Gabrielle Driver, PharmD

## 2025-01-22 NOTE — PROGRESS NOTES
Clinical Pharmacy Note  Heparin Dosing       Lab Results   Component Value Date/Time    APTT 33.5 07/17/2024 05:30 PM     Lab Results   Component Value Date/Time    HGB 11.5 01/22/2025 06:46 AM    HCT 33.3 01/22/2025 06:46 AM     01/22/2025 06:46 AM    INR 0.97 01/21/2025 07:10 PM       Lab Results   Component Value Date/Time    ANTIXAUHEP 0.29 01/22/2025 09:56 AM       Current Infusion Rate: 1100 units/hr    Plan:  Bolus: 2000 units  Rate: 1260 units/hr  Next anti-Xa level: 1800    Pharmacy will continue to monitor and adjust based on anti-Xa results.  Marco Antonio Reyes RPH, 1/22/2025 11:14 AM

## 2025-01-22 NOTE — PLAN OF CARE
Problem: Skin/Tissue Integrity  Goal: Absence of new skin breakdown  Description: 1.  Monitor for areas of redness and/or skin breakdown  2.  Assess vascular access sites hourly  3.  Every 4-6 hours minimum:  Change oxygen saturation probe site  4.  Every 4-6 hours:  If on nasal continuous positive airway pressure, respiratory therapy assess nares and determine need for appliance change or resting period.  Outcome: Progressing     Problem: ABCDS Injury Assessment  Goal: Absence of physical injury  Outcome: Progressing     Problem: Discharge Planning  Goal: Discharge to home or other facility with appropriate resources  Outcome: Progressing

## 2025-01-23 LAB
ANION GAP SERPL CALCULATED.3IONS-SCNC: 11 MMOL/L (ref 3–16)
BUN SERPL-MCNC: 16 MG/DL (ref 7–20)
CALCIUM SERPL-MCNC: 8.8 MG/DL (ref 8.3–10.6)
CHLORIDE SERPL-SCNC: 105 MMOL/L (ref 99–110)
CO2 SERPL-SCNC: 26 MMOL/L (ref 21–32)
CREAT SERPL-MCNC: 1 MG/DL (ref 0.9–1.3)
DEPRECATED RDW RBC AUTO: 14.3 % (ref 12.4–15.4)
EKG ATRIAL RATE: 96 BPM
EKG DIAGNOSIS: NORMAL
EKG P AXIS: 51 DEGREES
EKG P-R INTERVAL: 148 MS
EKG Q-T INTERVAL: 382 MS
EKG QRS DURATION: 92 MS
EKG QTC CALCULATION (BAZETT): 482 MS
EKG R AXIS: -56 DEGREES
EKG T AXIS: 71 DEGREES
EKG VENTRICULAR RATE: 96 BPM
GFR SERPLBLD CREATININE-BSD FMLA CKD-EPI: >90 ML/MIN/{1.73_M2}
GLUCOSE BLD-MCNC: 138 MG/DL (ref 70–99)
GLUCOSE BLD-MCNC: 143 MG/DL (ref 70–99)
GLUCOSE BLD-MCNC: 168 MG/DL (ref 70–99)
GLUCOSE BLD-MCNC: 232 MG/DL (ref 70–99)
GLUCOSE SERPL-MCNC: 155 MG/DL (ref 70–99)
HCT VFR BLD AUTO: 30.5 % (ref 40.5–52.5)
HGB BLD-MCNC: 10.8 G/DL (ref 13.5–17.5)
MCH RBC QN AUTO: 30.4 PG (ref 26–34)
MCHC RBC AUTO-ENTMCNC: 35.3 G/DL (ref 31–36)
MCV RBC AUTO: 86.3 FL (ref 80–100)
PERFORMED ON: ABNORMAL
PLATELET # BLD AUTO: 209 K/UL (ref 135–450)
PMV BLD AUTO: 9.1 FL (ref 5–10.5)
POTASSIUM SERPL-SCNC: 3.9 MMOL/L (ref 3.5–5.1)
RBC # BLD AUTO: 3.54 M/UL (ref 4.2–5.9)
SODIUM SERPL-SCNC: 142 MMOL/L (ref 136–145)
WBC # BLD AUTO: 10.8 K/UL (ref 4–11)

## 2025-01-23 PROCEDURE — 6370000000 HC RX 637 (ALT 250 FOR IP): Performed by: INTERNAL MEDICINE

## 2025-01-23 PROCEDURE — 99233 SBSQ HOSP IP/OBS HIGH 50: CPT | Performed by: INTERNAL MEDICINE

## 2025-01-23 PROCEDURE — 94761 N-INVAS EAR/PLS OXIMETRY MLT: CPT

## 2025-01-23 PROCEDURE — 93005 ELECTROCARDIOGRAM TRACING: CPT | Performed by: INTERNAL MEDICINE

## 2025-01-23 PROCEDURE — 6360000002 HC RX W HCPCS: Performed by: NURSE PRACTITIONER

## 2025-01-23 PROCEDURE — 2500000003 HC RX 250 WO HCPCS: Performed by: INTERNAL MEDICINE

## 2025-01-23 PROCEDURE — 2100000000 HC CCU R&B

## 2025-01-23 PROCEDURE — 85027 COMPLETE CBC AUTOMATED: CPT

## 2025-01-23 PROCEDURE — 36415 COLL VENOUS BLD VENIPUNCTURE: CPT

## 2025-01-23 PROCEDURE — 80048 BASIC METABOLIC PNL TOTAL CA: CPT

## 2025-01-23 PROCEDURE — 93010 ELECTROCARDIOGRAM REPORT: CPT | Performed by: INTERNAL MEDICINE

## 2025-01-23 RX ORDER — KETOROLAC TROMETHAMINE 30 MG/ML
30 INJECTION, SOLUTION INTRAMUSCULAR; INTRAVENOUS ONCE
Status: COMPLETED | OUTPATIENT
Start: 2025-01-23 | End: 2025-01-23

## 2025-01-23 RX ADMIN — INSULIN LISPRO 1 UNITS: 100 INJECTION, SOLUTION INTRAVENOUS; SUBCUTANEOUS at 12:07

## 2025-01-23 RX ADMIN — METFORMIN HYDROCHLORIDE 500 MG: 500 TABLET, FILM COATED ORAL at 08:55

## 2025-01-23 RX ADMIN — SODIUM CHLORIDE, PRESERVATIVE FREE 10 ML: 5 INJECTION INTRAVENOUS at 20:33

## 2025-01-23 RX ADMIN — METFORMIN HYDROCHLORIDE 500 MG: 500 TABLET, FILM COATED ORAL at 17:58

## 2025-01-23 RX ADMIN — ATORVASTATIN CALCIUM 80 MG: 80 TABLET, FILM COATED ORAL at 20:29

## 2025-01-23 RX ADMIN — EMPAGLIFLOZIN 10 MG: 10 TABLET, FILM COATED ORAL at 08:56

## 2025-01-23 RX ADMIN — KETOROLAC TROMETHAMINE 30 MG: 30 INJECTION, SOLUTION INTRAMUSCULAR at 02:10

## 2025-01-23 RX ADMIN — PANTOPRAZOLE SODIUM 40 MG: 40 TABLET, DELAYED RELEASE ORAL at 08:56

## 2025-01-23 RX ADMIN — SODIUM CHLORIDE, PRESERVATIVE FREE 10 ML: 5 INJECTION INTRAVENOUS at 20:34

## 2025-01-23 RX ADMIN — METOPROLOL SUCCINATE 12.5 MG: 25 TABLET, EXTENDED RELEASE ORAL at 08:56

## 2025-01-23 RX ADMIN — INSULIN GLARGINE 18 UNITS: 100 INJECTION, SOLUTION SUBCUTANEOUS at 20:31

## 2025-01-23 ASSESSMENT — PAIN - FUNCTIONAL ASSESSMENT
PAIN_FUNCTIONAL_ASSESSMENT: ACTIVITIES ARE NOT PREVENTED

## 2025-01-23 ASSESSMENT — PAIN DESCRIPTION - DESCRIPTORS
DESCRIPTORS: ACHING;DISCOMFORT
DESCRIPTORS: ACHING
DESCRIPTORS: ACHING;DISCOMFORT
DESCRIPTORS: ACHING;DISCOMFORT

## 2025-01-23 ASSESSMENT — PAIN DESCRIPTION - ORIENTATION
ORIENTATION: RIGHT;LEFT
ORIENTATION: LEFT

## 2025-01-23 ASSESSMENT — PAIN DESCRIPTION - LOCATION
LOCATION: ARM
LOCATION: ARM;GROIN

## 2025-01-23 ASSESSMENT — PAIN SCALES - GENERAL
PAINLEVEL_OUTOF10: 5
PAINLEVEL_OUTOF10: 2
PAINLEVEL_OUTOF10: 5
PAINLEVEL_OUTOF10: 4

## 2025-01-23 ASSESSMENT — PAIN DESCRIPTION - PAIN TYPE: TYPE: ACUTE PAIN

## 2025-01-23 ASSESSMENT — PAIN DESCRIPTION - FREQUENCY: FREQUENCY: CONTINUOUS

## 2025-01-23 ASSESSMENT — PAIN DESCRIPTION - ONSET: ONSET: ON-GOING

## 2025-01-23 NOTE — PLAN OF CARE
Problem: Skin/Tissue Integrity  Goal: Absence of new skin breakdown  Description: 1.  Monitor for areas of redness and/or skin breakdown  2.  Assess vascular access sites hourly  3.  Every 4-6 hours minimum:  Change oxygen saturation probe site  4.  Every 4-6 hours:  If on nasal continuous positive airway pressure, respiratory therapy assess nares and determine need for appliance change or resting period.  Outcome: Progressing     Problem: ABCDS Injury Assessment  Goal: Absence of physical injury  Outcome: Progressing     Problem: Discharge Planning  Goal: Discharge to home or other facility with appropriate resources  Outcome: Progressing  Flowsheets (Taken 1/22/2025 2000)  Discharge to home or other facility with appropriate resources: Identify barriers to discharge with patient and caregiver

## 2025-01-23 NOTE — PROGRESS NOTES
4 Eyes Skin Assessment     NAME:  Landon Ordoñez  YOB: 1975  MEDICAL RECORD NUMBER:  [id number]    The patient is being assessed for  Admission - CVU    I agree that at least one RN has performed a thorough Head to Toe Skin Assessment on the patient. ALL assessment sites listed below have been assessed.      Areas assessed by both nurses:    Head, Face, Ears, Shoulders, Back, Chest, Arms, Elbows, Hands, Sacrum. Buttock, Coccyx, Ischium, Legs. Feet and Heels, and Under Medical Devices         Does the Patient have a Wound? No noted wound(s)       Leonel Prevention initiated by RN: No  Wound Care Orders initiated by RN: No    Pressure Injury (Stage 3,4, Unstageable, DTI, NWPT, and Complex wounds) if present, place Wound referral order by RN under : No    New Ostomies, if present place, Ostomy referral order under : No     Nurse 1 eSignature: Electronically signed by Niru Torres RN on 1/22/25 at 7:29 PM EST    **SHARE this note so that the co-signing nurse can place an eSignature**    Nurse 2 eSignature: Electronically signed by Angelique Viveros RN on 1/22/25 at 7:52 PM EST

## 2025-01-23 NOTE — PROGRESS NOTES
Patient transferred to CVU via wheelchair. Pt. A&Ox4. States he has pain in right groin when ambulating. Right groin puncture site bruised and tender to touch. RUE taut/swollen and bruised. Will apply compression wrap to help with edema. Patient oriented to room and call light. Pt. Instructed to call out if he needs anything.

## 2025-01-23 NOTE — PROGRESS NOTES
[facility]   Daily Progress Note      Admit Date:  1/20/2025    CC: \"   The patient is 49 y.o. male with a past medical history significant for transferring care to [facility]. 7/15/24 per Dr. Damon VILLAFUERTE and SOB and completed a [facility] s/p NSTEMI, ACS revealing multivessel disease and therefore referred for CABG. LVEF 35%. 7/17/24 he is s/p CABG x4 LIMA to LAD, SVG to OM2, SBG to PDA, SVG to DIAG. TCPB, AYSHA, doppler flow verification of bypass grafts with medistim probe 3.0, EVH of right saphenous vein, insertion of temporary atrial and ventricular pacing wires. 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      His last follow up with Denise EL with Cardiothoracic surgery 9/10/24 he reported feeling a wire to both sides of his lower abdomen, intermittent chest discomfort/pain to left chest. Uncontrolled glucose with recent start on Jardiance but has not started therapy at the time of the appointment. Follow up with Dr. Conti for pacing wire check. CXR 10/26/24 with no acute findings.      Today, he notes that if he does \"to much of anything, I get chest pain which makes it hard to breath.\" This is different from his angina. He is trying to increase his walking routine for exercise. He denies any nocturnal symptoms. Leg swelling have resolved. He will elevated his legs when seated. Notes off/on numbness from vein harvesting. He did not complete cardiac rehab. The patient admits to medical therapy compliance and feels heis tolerating. Patient denies exertional chest pain/pressure similar to his angina, dyspnea at rest, PATRICK similar to his angina, PND, orthopnea, palpitations, lightheadedness, weight changes,  BLE edema, and syncope      Interval history 1/21/2025  Patient underwent coronary angiography on 1/20/2025 by Dr. 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

## 2025-01-23 NOTE — PROGRESS NOTES
NAME:  Landon Ordoñez  YOB: 1975  MEDICAL RECORD NUMBER:  [id number]    Shift Summary: Uneventful shift.    Family updated: No    Rhythm: Normal Sinus Rhythm     Most recent vitals:   Visit Vitals  BP 91/65   Pulse 80   Temp 98.7 °F (37.1 °C) (Oral)   Resp 22   Ht 1.676 m (5' 6\")   Wt 77.8 kg (171 lb 8.3 oz)   SpO2 97%   BMI 27.68 kg/m²           No data found.    No data found.      Respiratory support needed (if any):  - RA    Admission weight Weight - Scale: 80.3 kg (177 lb) (01/20/25 1019)    Today's weight    Wt Readings from Last 1 Encounters:   01/22/25 77.8 kg (171 lb 8.3 oz)        Severino need assessed each shift: N/A - no severino present  UOP >30ml/hr: YES  Last documented BM (in last 48 hrs):  Patient Vitals for the past 48 hrs:   Last BM (including prior to admit)   01/22/25 0825 01/21/25 01/22/25 2000 01/22/25                Restraints (in use currently or dc'd in last 12 hrs): No    Order current and documentation up to date? No    Lines/Drains reviewed @ bedside.  Peripheral IV 01/20/25 Right;Anterior Forearm (Active)   Number of days: 2         Drip rates at handoff:    sodium chloride      sodium chloride      dextrose         Lab Data:   CBC:   Recent Labs     01/22/25  0646 01/22/25  1845 01/23/25  0420   WBC 10.9  --  10.8   HGB 11.5* 11.1* 10.8*   HCT 33.3* 32.1* 30.5*   MCV 86.7  --  86.3     --  209     BMP:    Recent Labs     01/22/25  0646 01/23/25  0420    142   K 3.8 3.9   CO2 26 26   BUN 15 16   CREATININE 0.8* 1.0     LIVR: No results for input(s): \"AST\", \"ALT\" in the last 72 hours.  PT/INR:   Recent Labs     01/21/25  1910   INR 0.97     APTT:   Recent Labs     01/22/25 1845   APTT 44.2*     ABG: No results for input(s): \"PHART\", \"INP1JVD\", \"PO2ART\" in the last 72 hours.    Any consults during the shift? No    Any signed and held orders to be released?  No        4 Eyes Skin Assessment       The patient is being assessed for  Shift Handoff    I agree that at

## 2025-01-23 NOTE — PLAN OF CARE
Problem: Skin/Tissue Integrity  Goal: Absence of new skin breakdown  Description: 1.  Monitor for areas of redness and/or skin breakdown  2.  Assess vascular access sites hourly  3.  Every 4-6 hours minimum:  Change oxygen saturation probe site  4.  Every 4-6 hours:  If on nasal continuous positive airway pressure, respiratory therapy assess nares and determine need for appliance change or resting period.  1/23/2025 1811 by Angelique Viveros RN  Outcome: Progressing  1/23/2025 1810 by Angelique Viveros RN  Outcome: Progressing  1/23/2025 0459 by Zoe Terrell RN  Outcome: Progressing     Problem: ABCDS Injury Assessment  Goal: Absence of physical injury  1/23/2025 1811 by Angelique Viveros RN  Outcome: Progressing  Flowsheets (Taken 1/23/2025 1811)  Absence of Physical Injury: Implement safety measures based on patient assessment  1/23/2025 1810 by Angelique Viveros RN  Outcome: Progressing  Flowsheets (Taken 1/23/2025 1810)  Absence of Physical Injury: Implement safety measures based on patient assessment  1/23/2025 0459 by Zoe Terrell RN  Outcome: Progressing     Problem: Discharge Planning  Goal: Discharge to home or other facility with appropriate resources  1/23/2025 1811 by Angelique Viveros RN  Outcome: Progressing  1/23/2025 1810 by Angelique Viveros RN  Outcome: Progressing  Flowsheets (Taken 1/23/2025 1810)  Discharge to home or other facility with appropriate resources:   Identify barriers to discharge with patient and caregiver   Arrange for needed discharge resources and transportation as appropriate   Identify discharge learning needs (meds, wound care, etc)   Refer to discharge planning if patient needs post-hospital services based on physician order or complex needs related to functional status, cognitive ability or social support system  1/23/2025 0459 by Zoe Terrell RN  Outcome: Progressing  Flowsheets (Taken 1/22/2025 2000)  Discharge to home or other facility with

## 2025-01-23 NOTE — PROGRESS NOTES
Call given to mother per patient request, Ifrah.     Electronically signed by Bouchra Joseph RN on 1/22/2025 at 7:12 PM

## 2025-01-23 NOTE — PROGRESS NOTES
Received call from Dr Luevano. Discussed case. He would like patient monitored in CVU. Does not want surgery involved at this time, consult discontinued. Discussed pain to upper left arm, he states this is most likely hematoma from prior phlebotomy. Dr Luevano suggests to wrap extremity, no need for vascular study at this time unless neurovascular changes occur in the extremity.

## 2025-01-24 ENCOUNTER — HOSPITAL ENCOUNTER (INPATIENT)
Dept: VASCULAR LAB | Age: 50
Discharge: HOME | End: 2025-01-26
Attending: INTERNAL MEDICINE
Payer: COMMERCIAL

## 2025-01-24 ENCOUNTER — TELEPHONE (OUTPATIENT)
Dept: CARDIOLOGY CLINIC | Age: 50
End: 2025-01-24

## 2025-01-24 VITALS
HEART RATE: 76 BPM | BODY MASS INDEX: 27.57 KG/M2 | OXYGEN SATURATION: 98 % | WEIGHT: 171.52 LBS | HEIGHT: 66 IN | SYSTOLIC BLOOD PRESSURE: 91 MMHG | DIASTOLIC BLOOD PRESSURE: 51 MMHG | TEMPERATURE: 98 F | RESPIRATION RATE: 18 BRPM

## 2025-01-24 DIAGNOSIS — I21.4 NSTEMI (NON-ST ELEVATED MYOCARDIAL INFARCTION) (HCC): Primary | ICD-10-CM

## 2025-01-24 LAB
ANION GAP SERPL CALCULATED.3IONS-SCNC: 11 MMOL/L (ref 3–16)
BASOPHILS # BLD: 0 K/UL (ref 0–0.2)
BASOPHILS NFR BLD: 0.4 %
BUN SERPL-MCNC: 16 MG/DL (ref 7–20)
CALCIUM SERPL-MCNC: 8.8 MG/DL (ref 8.3–10.6)
CHLORIDE SERPL-SCNC: 104 MMOL/L (ref 99–110)
CO2 SERPL-SCNC: 24 MMOL/L (ref 21–32)
CREAT SERPL-MCNC: 0.7 MG/DL (ref 0.9–1.3)
DEPRECATED RDW RBC AUTO: 14 % (ref 12.4–15.4)
ECHO BSA: 1.93 M2
EOSINOPHIL # BLD: 0.1 K/UL (ref 0–0.6)
EOSINOPHIL NFR BLD: 0.5 %
GFR SERPLBLD CREATININE-BSD FMLA CKD-EPI: >90 ML/MIN/{1.73_M2}
GLUCOSE BLD-MCNC: 111 MG/DL (ref 70–99)
GLUCOSE BLD-MCNC: 193 MG/DL (ref 70–99)
GLUCOSE SERPL-MCNC: 101 MG/DL (ref 70–99)
HCT VFR BLD AUTO: 29.3 % (ref 40.5–52.5)
HGB BLD-MCNC: 10.3 G/DL (ref 13.5–17.5)
LYMPHOCYTES # BLD: 2.3 K/UL (ref 1–5.1)
LYMPHOCYTES NFR BLD: 22.1 %
MCH RBC QN AUTO: 30 PG (ref 26–34)
MCHC RBC AUTO-ENTMCNC: 35 G/DL (ref 31–36)
MCV RBC AUTO: 85.9 FL (ref 80–100)
MONOCYTES # BLD: 0.9 K/UL (ref 0–1.3)
MONOCYTES NFR BLD: 8.4 %
NEUTROPHILS # BLD: 7.2 K/UL (ref 1.7–7.7)
NEUTROPHILS NFR BLD: 68.6 %
PERFORMED ON: ABNORMAL
PERFORMED ON: ABNORMAL
PLATELET # BLD AUTO: 227 K/UL (ref 135–450)
PMV BLD AUTO: 8.8 FL (ref 5–10.5)
POTASSIUM SERPL-SCNC: 3.5 MMOL/L (ref 3.5–5.1)
RBC # BLD AUTO: 3.41 M/UL (ref 4.2–5.9)
SODIUM SERPL-SCNC: 139 MMOL/L (ref 136–145)
WBC # BLD AUTO: 10.6 K/UL (ref 4–11)

## 2025-01-24 PROCEDURE — 80048 BASIC METABOLIC PNL TOTAL CA: CPT

## 2025-01-24 PROCEDURE — 36415 COLL VENOUS BLD VENIPUNCTURE: CPT

## 2025-01-24 PROCEDURE — 94760 N-INVAS EAR/PLS OXIMETRY 1: CPT

## 2025-01-24 PROCEDURE — 6370000000 HC RX 637 (ALT 250 FOR IP): Performed by: INTERNAL MEDICINE

## 2025-01-24 PROCEDURE — 99239 HOSP IP/OBS DSCHRG MGMT >30: CPT | Performed by: INTERNAL MEDICINE

## 2025-01-24 PROCEDURE — 93971 EXTREMITY STUDY: CPT | Performed by: SURGERY

## 2025-01-24 PROCEDURE — 85025 COMPLETE CBC W/AUTO DIFF WBC: CPT

## 2025-01-24 PROCEDURE — 93971 EXTREMITY STUDY: CPT

## 2025-01-24 PROCEDURE — 2500000003 HC RX 250 WO HCPCS: Performed by: INTERNAL MEDICINE

## 2025-01-24 RX ORDER — POLYETHYLENE GLYCOL 3350 17 G/17G
17 POWDER, FOR SOLUTION ORAL DAILY PRN
Status: DISCONTINUED | OUTPATIENT
Start: 2025-01-24 | End: 2025-01-24 | Stop reason: HOSPADM

## 2025-01-24 RX ADMIN — METOPROLOL SUCCINATE 12.5 MG: 25 TABLET, EXTENDED RELEASE ORAL at 08:00

## 2025-01-24 RX ADMIN — EMPAGLIFLOZIN 10 MG: 10 TABLET, FILM COATED ORAL at 09:04

## 2025-01-24 RX ADMIN — POLYETHYLENE GLYCOL 3350 17 G: 17 POWDER, FOR SOLUTION ORAL at 09:05

## 2025-01-24 RX ADMIN — SODIUM CHLORIDE, PRESERVATIVE FREE 10 ML: 5 INJECTION INTRAVENOUS at 08:00

## 2025-01-24 RX ADMIN — METFORMIN HYDROCHLORIDE 500 MG: 500 TABLET, FILM COATED ORAL at 08:00

## 2025-01-24 RX ADMIN — ASPIRIN 81 MG: 81 TABLET, COATED ORAL at 08:00

## 2025-01-24 RX ADMIN — PANTOPRAZOLE SODIUM 40 MG: 40 TABLET, DELAYED RELEASE ORAL at 08:00

## 2025-01-24 RX ADMIN — INSULIN LISPRO 1 UNITS: 100 INJECTION, SOLUTION INTRAVENOUS; SUBCUTANEOUS at 12:22

## 2025-01-24 RX ADMIN — CLOPIDOGREL BISULFATE 75 MG: 75 TABLET ORAL at 08:00

## 2025-01-24 NOTE — PROGRESS NOTES
This RN contacted lab to inquire about status of results. Lab stated that they can see that the blood has been collected, however, it has not been sent down to processing. I made lab aware that it has been almost an hour since the tech was in CVU with my pt. I am currently waiting for a return call with an update.

## 2025-01-24 NOTE — TELEPHONE ENCOUNTER
Patient being discharged from [facility] 1/24/25. Per Dr. Pulido patient needs to be scheduled for LHC/PCI with Dr. Luevano at [facility] the week of February 3, 2025.    Please confirm the plan with Dr. Luevano and forward for scheduling, Thanks    The morning of your procedure you will park in the hospital parking lot and report directly to the cath lab to check in.     Pre-Procedure Instructions   You will need to fast for at least 8 hours prior to procedure. No caffeine the morning of.   Hold all diabetic medications including, Metfomin.  If you take Lantus/Levemir only take ½ your normal dose the evening before.  All other medications can be taken in the morning with sips of water.   You will need to take 325 mg aspirin the morning of.  If you are currently taking 81 mg please take 4 tablets that morning.   Do not use any lotions, creams or perfume the morning of procedure.   Pre-procedure lab work will need to be completed 5-7 days prior to procedure.   Please have a responsible adult to drive you home after procedure. We advise you have someone to stay with you for 24 hours following procedure for precautionary measures. Depending on procedure you may require an overnight stay.   Cath lab will provide you with all post procedure instructions.     If you have any questions regarding the procedure itself or medications, please call [phone] and ask to speak with a nurse.

## 2025-01-24 NOTE — PLAN OF CARE
Problem: Skin/Tissue Integrity  Goal: Absence of new skin breakdown  Description: 1.  Monitor for areas of redness and/or skin breakdown  2.  Assess vascular access sites hourly  3.  Every 4-6 hours minimum:  Change oxygen saturation probe site  4.  Every 4-6 hours:  If on nasal continuous positive airway pressure, respiratory therapy assess nares and determine need for appliance change or resting period.  1/24/2025 0226 by Zoe Terrell RN  Outcome: Progressing  1/23/2025 1811 by Angelique Viveros RN  Outcome: Progressing  1/23/2025 1810 by Angelique Viveros RN  Outcome: Progressing     Problem: ABCDS Injury Assessment  Goal: Absence of physical injury  1/24/2025 0226 by Zoe Terrell RN  Outcome: Progressing  1/23/2025 1811 by Angelique Viveros RN  Outcome: Progressing  Flowsheets (Taken 1/23/2025 1811)  Absence of Physical Injury: Implement safety measures based on patient assessment  1/23/2025 1810 by Angelique Viveros RN  Outcome: Progressing  Flowsheets (Taken 1/23/2025 1810)  Absence of Physical Injury: Implement safety measures based on patient assessment     Problem: Discharge Planning  Goal: Discharge to home or other facility with appropriate resources  1/24/2025 0226 by Zoe Terrell RN  Outcome: Progressing  Flowsheets (Taken 1/23/2025 2000)  Discharge to home or other facility with appropriate resources: Identify barriers to discharge with patient and caregiver  1/23/2025 1811 by Angelique Viveros RN  Outcome: Progressing  1/23/2025 1810 by Angelique Viveros RN  Outcome: Progressing  Flowsheets (Taken 1/23/2025 1810)  Discharge to home or other facility with appropriate resources:   Identify barriers to discharge with patient and caregiver   Arrange for needed discharge resources and transportation as appropriate   Identify discharge learning needs (meds, wound care, etc)   Refer to discharge planning if patient needs post-hospital services based on physician order or complex

## 2025-01-24 NOTE — PROGRESS NOTES
Pt stated that he has not been able to have a BM since Saturday the 1/18. He is requesting a stool softner..

## 2025-01-24 NOTE — PROGRESS NOTES
Pt stated that his L arm was showing more bruising than previous day. Pulses present and L arm was warm to the touch. No loss of movement or sensation. Ace bandage was applied to L arm.

## 2025-01-24 NOTE — PROGRESS NOTES
NAME:  Landon Ordoñez  YOB: 1975  MEDICAL RECORD NUMBER:  [id number]    Shift Summary: R fem groin site remains stable. Patient continues to have LUE edema. Venous doppler study ordered. Aspirin and plavix held today, continuing to monitor for bleeding. VS stable.     Family updated: Yes:  mother and family at the bedside.    Rhythm: Normal Sinus Rhythm     Most recent vitals:   Visit Vitals  /68   Pulse 98   Temp 98.4 °F (36.9 °C) (Oral)   Resp 17   Ht 1.676 m (5' 6\")   Wt 77.8 kg (171 lb 8.3 oz)   SpO2 100%   BMI 27.68 kg/m²           No data found.    No data found.      Respiratory support needed (if any):  - RA    Admission weight Weight - Scale: 80.3 kg (177 lb) (01/20/25 1019)    Today's weight    Wt Readings from Last 1 Encounters:   01/23/25 77.8 kg (171 lb 8.3 oz)        Severino need assessed each shift: N/A - no severino present  UOP >30ml/hr: YES  Last documented BM (in last 48 hrs):  Patient Vitals for the past 48 hrs:   Last BM (including prior to admit)   01/22/25 0825 01/21/25 01/22/25 2000 01/22/25                Restraints (in use currently or dc'd in last 12 hrs): No    Order current and documentation up to date? Yes    Lines/Drains reviewed @ bedside.  Peripheral IV 01/20/25 Right;Anterior Forearm (Active)   Number of days: 3         Drip rates at handoff:    sodium chloride      sodium chloride      dextrose         Lab Data:   CBC:   Recent Labs     01/22/25  0646 01/22/25  1845 01/23/25  0420   WBC 10.9  --  10.8   HGB 11.5* 11.1* 10.8*   HCT 33.3* 32.1* 30.5*   MCV 86.7  --  86.3     --  209     BMP:    Recent Labs     01/22/25  0646 01/23/25  0420    142   K 3.8 3.9   CO2 26 26   BUN 15 16   CREATININE 0.8* 1.0     LIVR: No results for input(s): \"AST\", \"ALT\" in the last 72 hours.  PT/INR:   Recent Labs     01/21/25  1910   INR 0.97     APTT:   Recent Labs     01/22/25  1845   APTT 44.2*     ABG: No results for input(s): \"PHART\", \"UMS7BKA\", \"PO2ART\" in the last  72 hours.    Any consults during the shift? No    Any signed and held orders to be released?  No        4 Eyes Skin Assessment       The patient is being assessed for  Shift Handoff    I agree that at least one RN has performed a thorough Head to Toe Skin Assessment on the patient. ALL assessment sites listed below have been assessed.      Areas assessed by both nurses: Head, Face, Ears, Shoulders, Back, Chest, Arms, Elbows, Hands, Sacrum. Buttock, Coccyx, Ischium, Legs. Feet and Heels, and Under Medical Devices         Does the Patient have a Wound? No noted wound(s)    Wound Care Orders initiated by RN: No       Leonel Prevention initiated by RN: No    Pressure Injury (Stage 3,4, Unstageable, DTI, NWPT, and Complex wounds) if present, place Wound referral order by RN under : No    New Ostomies, if present place, Ostomy referral order under : No     Nurse 1 eSignature: Electronically signed by Angelique Viveros RN on 1/23/25 at 7:05 PM EST    **SHARE this note so that the co-signing nurse can place an eSignature**    Nurse 2 eSignature: Electronically signed by Zoe Terrell RN on 1/23/25 at 10:32 PM EST

## 2025-01-24 NOTE — DISCHARGE SUMMARY
[facility]    DISCHARGE SUMMARY      Patient ID:  Landon Ordoñez  [id number] 49 y.o. 1975    Admit date: 1/20/2025    Discharge date:  1/24/2025    Admitting Physician: Pj Blackman MD     Discharge Physician: Srini Pulido MD     Admission Diagnoses: CAD in native artery [I25.10]  Hx of CABG [Z95.1]  Unstable angina (HCC) [I20.0]    Discharge Diagnoses:   Patient Active Problem List   Diagnosis    NSTEMI (non-ST elevated myocardial infarction) ([facility])    Chest pain    Coronary artery disease, unspecified vessel or lesion type, unspecified whether angina present, unspecified whether native or transplanted heart    CAD, multiple vessel    Dyslipidemia    Cardiomyopathy (HCC)    S/P CABG x 4    CAD in native artery    Hx of CABG    Unstable angina ([facility])    Abnormal nuclear stress test        Discharged Condition: good    Hospital Course: Landon Ordoñez was admitted after he underwent coronary angiography and PCI.  Patient has been experiencing crescendo angina and had a nuclear stress test which was abnormal for reversible ischemia he underwent coronary angiography by Dr. Blackman.  He was found to have significant stenosis of his saphenous vein graft to the right coronary artery and obtuse marginal branch.  His LIMA was patent but there was a very high-grade stenosis of LAD beyond LIMA if things were made to for percutaneous intervention.  He had a successful PCI of his proximal LAD extending into the diagonal branch but native LAD could not be wired.  Patient was supposed to have a complex intervention of his mid LAD but it has been postponed since patient developed large retroperitoneal bleed.  His hemoglobin has remained stable and his symptoms of abdominal pain has improved significantly.  His aspirin and Plavix were put on hold for a day but he is back on dual antiplatelet therapy and decision is to wait for 7 to 10 days before considering him for PCI.    Consults:  None    Significant

## 2025-01-24 NOTE — PROGRESS NOTES
Patient discharged per MD order.  IV removed per policy.  Discharge instructions and follow up given and patient verbalized understanding. Patient wheeled off unit with RN to vehicle.

## 2025-01-24 NOTE — CARE COORDINATION
DC order noted.  Spoke with bedside RN, Zhang.  No needs.  MELISSA Santos     Case Management   [phone]    1/24/2025  1:56 PM

## 2025-01-24 NOTE — PROGRESS NOTES
NAME:  Landon Ordoñez  YOB: 1975  MEDICAL RECORD NUMBER:  [id number]    Shift Summary: Spo2 taken on portable since it is spot check. 2100 Metoprolol was held d/t soft blood pressures.    Family updated: No    Rhythm: Normal Sinus Rhythm     Most recent vitals:   Visit Vitals  /69   Pulse 80   Temp 98.7 °F (37.1 °C) (Oral)   Resp 18   Ht 1.676 m (5' 6\")   Wt 77.8 kg (171 lb 8.3 oz)   SpO2 98%   BMI 27.68 kg/m²           No data found.    No data found.      Respiratory support needed (if any):  - RA    Admission weight Weight - Scale: 80.3 kg (177 lb) (01/20/25 1019)    Today's weight    Wt Readings from Last 1 Encounters:   01/23/25 77.8 kg (171 lb 8.3 oz)        Severino need assessed each shift: N/A - no severino present  UOP >30ml/hr: YES  Last documented BM (in last 48 hrs):  Patient Vitals for the past 48 hrs:   Last BM (including prior to admit)   01/22/25 0825 01/21/25 01/22/25 2000 01/22/25 01/23/25 2000 01/18/25 01/24/25 0400 01/18/25                Restraints (in use currently or dc'd in last 12 hrs): No    Order current and documentation up to date? No    Lines/Drains reviewed @ bedside.  Peripheral IV 01/20/25 Right;Anterior Forearm (Active)   Number of days: 3         Drip rates at handoff:    sodium chloride      sodium chloride      dextrose         Lab Data:   CBC:   Recent Labs     01/22/25  0646 01/22/25  1845 01/23/25  0420   WBC 10.9  --  10.8   HGB 11.5* 11.1* 10.8*   HCT 33.3* 32.1* 30.5*   MCV 86.7  --  86.3     --  209     BMP:    Recent Labs     01/22/25  0646 01/23/25  0420    142   K 3.8 3.9   CO2 26 26   BUN 15 16   CREATININE 0.8* 1.0     LIVR: No results for input(s): \"AST\", \"ALT\" in the last 72 hours.  PT/INR:   Recent Labs     01/21/25  1910   INR 0.97     APTT:   Recent Labs     01/22/25  1845   APTT 44.2*     ABG: No results for input(s): \"PHART\", \"QFX4JMO\", \"PO2ART\" in the last 72 hours.    Any consults during the shift? No    Any signed and held

## 2025-01-29 NOTE — TELEPHONE ENCOUNTER
Please schedule C with intervention      The morning of your procedure you will park in the hospital parking lot and report directly to the cath lab to check in.     Pre-Procedure Instructions   You will need to fast for at least 8 hours prior to procedure. No caffeine the morning of.   Hold all diabetic medications including, Metfomin.  If you take Lantus/Levemir only take ½ your normal dose the evening before.  All other medications can be taken in the morning with sips of water.   Do not use any lotions, creams or perfume the morning of procedure.   Pre-procedure lab work will need to be completed 5-7 days prior to procedure.   Please have a responsible adult to drive you home after procedure. We advise you have someone to stay with you for 24 hours following procedure for precautionary measures. Depending on procedure you may require an overnight stay.   Cath lab will provide you with all post procedure instructions.     If you have any questions regarding the procedure itself or medications, please call [phone] and ask to speak with a nurse.

## 2025-01-29 NOTE — TELEPHONE ENCOUNTER
I called and spoke to Landon and he stated that he is not ready to schedule his procedure yet. He states that his arm is still swollen and bruised and he still hasn't been able to have a bowel movement. He said he wants to make sure everything is healed before scheduling. I told him to give me a call back when he is ready to schedule, he v/u.

## 2025-02-25 ENCOUNTER — OFFICE VISIT (OUTPATIENT)
Age: 50
End: 2025-02-25

## 2025-02-25 VITALS
SYSTOLIC BLOOD PRESSURE: 119 MMHG | OXYGEN SATURATION: 95 % | BODY MASS INDEX: 27.6 KG/M2 | DIASTOLIC BLOOD PRESSURE: 80 MMHG | TEMPERATURE: 98.6 F | HEART RATE: 97 BPM | WEIGHT: 171 LBS

## 2025-02-25 DIAGNOSIS — H00.015 HORDEOLUM EXTERNUM OF LEFT LOWER EYELID: Primary | ICD-10-CM

## 2025-02-25 DIAGNOSIS — H00.021 HORDEOLUM INTERNUM OF RIGHT UPPER EYELID: ICD-10-CM

## 2025-02-25 RX ORDER — TOBRAMYCIN 3 MG/ML
1 SOLUTION/ DROPS OPHTHALMIC EVERY 4 HOURS
Qty: 5 ML | Refills: 0 | Status: SHIPPED | OUTPATIENT
Start: 2025-02-25 | End: 2025-03-07

## 2025-02-25 ASSESSMENT — ENCOUNTER SYMPTOMS
DIARRHEA: 0
SORE THROAT: 0
CHOKING: 0
RHINORRHEA: 0
COUGH: 0
GASTROINTESTINAL NEGATIVE: 1
ABDOMINAL DISTENTION: 0
BLURRED VISION: 0
DOUBLE VISION: 0
EYE REDNESS: 1
EYE ITCHING: 1
SINUS PAIN: 0
SINUS PRESSURE: 0
ABDOMINAL PAIN: 0
ALLERGIC/IMMUNOLOGIC NEGATIVE: 1
FOREIGN BODY SENSATION: 0
PHOTOPHOBIA: 0
EYE DISCHARGE: 1

## 2025-02-25 NOTE — PATIENT INSTRUCTIONS
You have been diagnosed with a hordeolum, we recommend the following:     Take the eye drops every 4 hours for 10 days or until eye swelling and pain has resolved.   Use warm compresses to area to help with swelling and pain  Use Tylenol or Ibuprofen to help with discomfort and swelling

## 2025-02-25 NOTE — PROGRESS NOTES
Landon Ordoñez (:  1975) is a 49 y.o. male,New patient, here for evaluation of the following chief complaint(s):  Eye Problem (Symptoms began 2 days ago )      ASSESSMENT/PLAN:  Encounter Diagnoses   Name Primary?    Hordeolum externum of left lower eyelid Yes    Hordeolum internum of right upper eyelid      Orders Placed This Encounter   Medications    tobramycin (TOBREX) 0.3 % ophthalmic solution     Sig: Place 1 drop into both eyes every 4 hours for 10 days     Dispense:  5 mL     Refill:  0   Tobramycin, Take the eye drops every 4 hours for 10 days or until eye swelling and pain has resolved.   Use warm compresses to area to help with swelling and pain  Use Tylenol to help with discomfort and swelling  If symptoms do not resolve or worsen in next 3-5 days, call or return to clinic.       SUBJECTIVE/OBJECTIVE:  Patient presents to clinic for c/o eye symptoms that began 2 days ago. Swelling to left eye that is increasing and now starting on right eye. Right eye swelling noticed today. Left eye present for 3 days with itching, watering and discomfort. Has not had these symptoms previously       History provided by:  Patient   used: No    Eye Problem   Both eyes are affected. This is a new problem. The current episode started in the past 7 days. The problem occurs constantly. There was no injury mechanism. The patient is experiencing no pain. There is No known exposure to pink eye. He Does not wear contacts. Associated symptoms include an eye discharge, eye redness and itching. Pertinent negatives include no blurred vision, double vision, fever, foreign body sensation or photophobia. He has tried nothing for the symptoms. The treatment provided no relief.           Vitals:    25 1420   BP: 119/80   Site: Left Upper Arm   Position: Sitting   Cuff Size: Large Adult   Pulse: 97   Temp: 98.6 °F (37 °C)   TempSrc: Oral   SpO2: 95%   Weight: 77.6 kg (171 lb)       Review of Systems

## 2025-03-05 NOTE — PROGRESS NOTES
and dry. There is no rash or diaphoresis.   Psychiatric: He has a normal mood and affect. His speech is normal and behavior is normal.     Lab Review:    Lab Results   Component Value Date/Time    TRIG 306 07/15/2024 02:38 AM    HDL 49 07/15/2024 02:38 AM    LDLDIRECT 224 07/15/2024 02:38 AM      Lab Results   Component Value Date/Time    BUN 16 01/24/2025 06:48 AM    CREATININE 0.7 01/24/2025 06:48 AM       EKG Interpretation:   11/5/24 Sinus Rhythm, Anteroseptal infarct -age undetermined. -Negative T-waves -Lateral ischemia.   3/6/25    Image Review:     [facility] 7/15/24  Dominance: Right  Left Anterior Descending   Mid LAD lesion, 90% stenosed.      First Diagonal Branch   1st Diag lesion, 90% stenosed.      Left Circumflex      Second Obtuse Marginal Branch   2nd Mrg lesion, 100% stenosed. Lesion is the culprit lesion.      Right Coronary Artery   Mid RCA lesion, 75% stenosed. Lesion is the culprit lesion.      First Right Posterolateral Branch   1st RPL lesion, 70% stenosed.      2nd Mrg lesion   Angioplasty   Supplies used: CATH BLLN ANGIO 2X12MM SC EUPHORA RX   Post-Intervention Lesion Assessment   The intervention was successful. Intentional subintimal strategy was not used. Embolic protection device was not deployed. The guidewire was not threaded through a graft to reach the lesion. The guidewire crossed the lesion. There is no pre-intervention KATE flow. Post-intervention KATE flow is 3. There were no complications. Pressure wire/FFR was not measured. Ultrasound (IVUS) was not performed. CFVR was not performed.   There is a 90% residual stenosis post intervention.        Left Ventricle The ejection fraction is 35%.             Echo 7/15/24    Left Ventricle: Mildly reduced left ventricular systolic function with a visually estimated EF of 40 - 45%. Left ventricle size is normal. Mildly increased wall thickness. Mild global hypokinesis present.    Right Ventricle: Right ventricle size is normal. Normal systolic

## 2025-03-06 ENCOUNTER — TELEPHONE (OUTPATIENT)
Dept: CARDIOLOGY CLINIC | Age: 50
End: 2025-03-06

## 2025-03-06 ENCOUNTER — OFFICE VISIT (OUTPATIENT)
Dept: CARDIOLOGY CLINIC | Age: 50
End: 2025-03-06
Payer: COMMERCIAL

## 2025-03-06 VITALS
SYSTOLIC BLOOD PRESSURE: 116 MMHG | HEART RATE: 64 BPM | OXYGEN SATURATION: 99 % | WEIGHT: 179 LBS | BODY MASS INDEX: 28.77 KG/M2 | HEIGHT: 66 IN | DIASTOLIC BLOOD PRESSURE: 70 MMHG

## 2025-03-06 DIAGNOSIS — E78.5 HYPERLIPIDEMIA, UNSPECIFIED HYPERLIPIDEMIA TYPE: ICD-10-CM

## 2025-03-06 DIAGNOSIS — I10 ESSENTIAL HYPERTENSION: ICD-10-CM

## 2025-03-06 DIAGNOSIS — I25.10 CAD, MULTIPLE VESSEL: Primary | ICD-10-CM

## 2025-03-06 DIAGNOSIS — I25.10 CAD, MULTIPLE VESSEL: ICD-10-CM

## 2025-03-06 DIAGNOSIS — Z95.1 S/P CABG X 4: ICD-10-CM

## 2025-03-06 LAB
ANION GAP SERPL CALCULATED.3IONS-SCNC: 10 MMOL/L (ref 3–16)
BUN SERPL-MCNC: 21 MG/DL (ref 7–20)
CALCIUM SERPL-MCNC: 9.5 MG/DL (ref 8.3–10.6)
CHLORIDE SERPL-SCNC: 103 MMOL/L (ref 99–110)
CO2 SERPL-SCNC: 27 MMOL/L (ref 21–32)
CREAT SERPL-MCNC: 0.8 MG/DL (ref 0.9–1.3)
DEPRECATED RDW RBC AUTO: 15.6 % (ref 12.4–15.4)
GFR SERPLBLD CREATININE-BSD FMLA CKD-EPI: >90 ML/MIN/{1.73_M2}
GLUCOSE SERPL-MCNC: 207 MG/DL (ref 70–99)
HCT VFR BLD AUTO: 43.2 % (ref 40.5–52.5)
HGB BLD-MCNC: 14.4 G/DL (ref 13.5–17.5)
MCH RBC QN AUTO: 29.5 PG (ref 26–34)
MCHC RBC AUTO-ENTMCNC: 33.3 G/DL (ref 31–36)
MCV RBC AUTO: 88.7 FL (ref 80–100)
PLATELET # BLD AUTO: 215 K/UL (ref 135–450)
PMV BLD AUTO: 9.3 FL (ref 5–10.5)
POTASSIUM SERPL-SCNC: 4.7 MMOL/L (ref 3.5–5.1)
RBC # BLD AUTO: 4.87 M/UL (ref 4.2–5.9)
SODIUM SERPL-SCNC: 140 MMOL/L (ref 136–145)
WBC # BLD AUTO: 7.3 K/UL (ref 4–11)

## 2025-03-06 PROCEDURE — 99214 OFFICE O/P EST MOD 30 MIN: CPT | Performed by: INTERNAL MEDICINE

## 2025-03-06 PROCEDURE — 3074F SYST BP LT 130 MM HG: CPT | Performed by: INTERNAL MEDICINE

## 2025-03-06 PROCEDURE — G2211 COMPLEX E/M VISIT ADD ON: HCPCS | Performed by: INTERNAL MEDICINE

## 2025-03-06 PROCEDURE — 3078F DIAST BP <80 MM HG: CPT | Performed by: INTERNAL MEDICINE

## 2025-03-06 NOTE — TELEPHONE ENCOUNTER
Rachel - will call you to schedule (prior tele 1/24/25)     Left Heart Catheterization with planned intervention with Dr. Luevano at [facility]   The morning of your procedure you will park in the hospital parking lot and report directly to the registration desk to check in. [address] [address]    Pre-Procedure Instructions:   You will need to fast for at least 8 hours prior to procedure.   No caffeine the morning of.   No gum or mints, if having general anesthesia.   Hold all diabetic medications the morning of procedure including, Metformin and Insulin Lispro   If you take Lantus/Levemir only take ½ your normal dose the evening before.    All other medications can be taken in the morning with sips of water-including your aspirin and Plavix.   Do not use any lotions, creams or perfume the morning of procedure.   Pre-procedure lab work will need to be completed today 3/6/25 prior to procedure.   Please have a responsible adult to drive you home after procedure.   We advise you have someone to stay with you for 24 hours following procedure for precautionary measures.   Depending on procedure you may require an overnight stay.   Cath lab will provide you with all post procedure instructions.     If you have any questions regarding the procedure itself or medications, please call [phone] and ask to speak with a nurse.

## 2025-03-06 NOTE — PATIENT INSTRUCTIONS
Rachel - will call you to schedule     Left Heart Catheterization with planned intervention with Dr. Luevano at [facility]   The morning of your procedure you will park in the hospital parking lot and report directly to the registration desk to check in. [address] [address]    DATE:     TIME:     ARRIVAL TIME:     Pre-Procedure Instructions:   You will need to fast for at least 8 hours prior to procedure.   No caffeine the morning of.   No gum or mints, if having general anesthesia.   Hold all diabetic medications the morning of procedure including, Metformin and Insulin Lispro   If you take Lantus/Levemir only take ½ your normal dose the evening before.    All other medications can be taken in the morning with sips of water-including your aspirin and Plavix.   Do not use any lotions, creams or perfume the morning of procedure.   Pre-procedure lab work will need to be completed today 3/6/25 prior to procedure.   Please have a responsible adult to drive you home after procedure.   We advise you have someone to stay with you for 24 hours following procedure for precautionary measures.   Depending on procedure you may require an overnight stay.   Cath lab will provide you with all post procedure instructions.     If you have any questions regarding the procedure itself or medications, please call [phone] and ask to speak with a nurse.

## 2025-03-18 NOTE — TELEPHONE ENCOUNTER
Left message for Landon to return my call    Left multiple messages.   Registrars, please mail letter to Landon asking him to call the office to schedule his procedure. Thank you.

## 2025-03-19 NOTE — TELEPHONE ENCOUNTER
I called and spoke to Landon and offered him 4/2, and he stated that date isn't going to work that he has to work that day.     I told him I would have to give him a call back with a rescheduled date and time, he v/u

## 2025-03-20 NOTE — TELEPHONE ENCOUNTER
Landon called me this morning stating that he wants to go ahead and proceed with his procedure.     Date of Procedure: Wednesday 4/2/25 @ [facility] with Dr. Luevano      Time of arrival: 8:00 am    Procedure time: 9:30 am     Landon is agreeable to date and time. Reviewed and emailed Landon his procedure date, time and instructions and he verbalized understanding. Encouraged to call with any questions or concerns.     Published on Tile and e-mail to Ivonne.

## 2025-03-24 PROBLEM — I25.10 CAD (CORONARY ARTERY DISEASE): Status: ACTIVE | Noted: 2025-01-24

## 2025-04-02 ENCOUNTER — HOSPITAL ENCOUNTER (INPATIENT)
Age: 50
LOS: 1 days | Discharge: HOME | DRG: 322 | End: 2025-04-03
Attending: INTERNAL MEDICINE | Admitting: INTERNAL MEDICINE
Payer: COMMERCIAL

## 2025-04-02 DIAGNOSIS — I25.10 CAD (CORONARY ARTERY DISEASE): ICD-10-CM

## 2025-04-02 LAB
ANION GAP SERPL CALCULATED.3IONS-SCNC: 10 MMOL/L (ref 3–16)
BUN SERPL-MCNC: 17 MG/DL (ref 7–20)
CALCIUM SERPL-MCNC: 9.1 MG/DL (ref 8.3–10.6)
CHLORIDE SERPL-SCNC: 104 MMOL/L (ref 99–110)
CO2 SERPL-SCNC: 25 MMOL/L (ref 21–32)
CREAT SERPL-MCNC: 0.8 MG/DL (ref 0.9–1.3)
DEPRECATED RDW RBC AUTO: 14.7 % (ref 12.4–15.4)
EKG ATRIAL RATE: 58 BPM
EKG ATRIAL RATE: 69 BPM
EKG DIAGNOSIS: NORMAL
EKG DIAGNOSIS: NORMAL
EKG P AXIS: 27 DEGREES
EKG P AXIS: 42 DEGREES
EKG P-R INTERVAL: 154 MS
EKG P-R INTERVAL: 156 MS
EKG Q-T INTERVAL: 400 MS
EKG Q-T INTERVAL: 440 MS
EKG QRS DURATION: 88 MS
EKG QRS DURATION: 88 MS
EKG QTC CALCULATION (BAZETT): 428 MS
EKG QTC CALCULATION (BAZETT): 431 MS
EKG R AXIS: -53 DEGREES
EKG R AXIS: -57 DEGREES
EKG T AXIS: 101 DEGREES
EKG T AXIS: 96 DEGREES
EKG VENTRICULAR RATE: 58 BPM
EKG VENTRICULAR RATE: 69 BPM
GFR SERPLBLD CREATININE-BSD FMLA CKD-EPI: >90 ML/MIN/{1.73_M2}
GLUCOSE BLD-MCNC: 162 MG/DL (ref 70–99)
GLUCOSE BLD-MCNC: 182 MG/DL (ref 70–99)
GLUCOSE BLD-MCNC: 199 MG/DL (ref 70–99)
GLUCOSE SERPL-MCNC: 222 MG/DL (ref 70–99)
HCT VFR BLD AUTO: 42.9 % (ref 40.5–52.5)
HGB BLD-MCNC: 14.6 G/DL (ref 13.5–17.5)
MCH RBC QN AUTO: 29.4 PG (ref 26–34)
MCHC RBC AUTO-ENTMCNC: 34.1 G/DL (ref 31–36)
MCV RBC AUTO: 86.2 FL (ref 80–100)
PERFORMED ON: ABNORMAL
PLATELET # BLD AUTO: 189 K/UL (ref 135–450)
PMV BLD AUTO: 9.2 FL (ref 5–10.5)
POC ACT LR: 263 SEC
POC ACT LR: 355 SEC
POTASSIUM SERPL-SCNC: 3.9 MMOL/L (ref 3.5–5.1)
RBC # BLD AUTO: 4.98 M/UL (ref 4.2–5.9)
SODIUM SERPL-SCNC: 139 MMOL/L (ref 136–145)
WBC # BLD AUTO: 6 K/UL (ref 4–11)

## 2025-04-02 PROCEDURE — 027136Z DILATION OF CORONARY ARTERY, TWO ARTERIES WITH THREE DRUG-ELUTING INTRALUMINAL DEVICES, PERCUTANEOUS APPROACH: ICD-10-PCS | Performed by: INTERNAL MEDICINE

## 2025-04-02 PROCEDURE — 6370000000 HC RX 637 (ALT 250 FOR IP): Performed by: INTERNAL MEDICINE

## 2025-04-02 PROCEDURE — 2500000003 HC RX 250 WO HCPCS: Performed by: INTERNAL MEDICINE

## 2025-04-02 PROCEDURE — 2100000000 HC CCU R&B

## 2025-04-02 PROCEDURE — C1887 CATHETER, GUIDING: HCPCS | Performed by: INTERNAL MEDICINE

## 2025-04-02 PROCEDURE — B240ZZ3 ULTRASONOGRAPHY OF SINGLE CORONARY ARTERY, INTRAVASCULAR: ICD-10-PCS | Performed by: INTERNAL MEDICINE

## 2025-04-02 PROCEDURE — C1725 CATH, TRANSLUMIN NON-LASER: HCPCS | Performed by: INTERNAL MEDICINE

## 2025-04-02 PROCEDURE — C1760 CLOSURE DEV, VASC: HCPCS | Performed by: INTERNAL MEDICINE

## 2025-04-02 PROCEDURE — 80048 BASIC METABOLIC PNL TOTAL CA: CPT

## 2025-04-02 PROCEDURE — 85347 COAGULATION TIME ACTIVATED: CPT

## 2025-04-02 PROCEDURE — 92944 HC PRQ CARD REVASC CHRONIC ADDL: CPT | Performed by: INTERNAL MEDICINE

## 2025-04-02 PROCEDURE — 2580000003 HC RX 258: Performed by: INTERNAL MEDICINE

## 2025-04-02 PROCEDURE — 6360000002 HC RX W HCPCS: Performed by: INTERNAL MEDICINE

## 2025-04-02 PROCEDURE — 92978 ENDOLUMINL IVUS OCT C 1ST: CPT | Performed by: INTERNAL MEDICINE

## 2025-04-02 PROCEDURE — B2111ZZ FLUOROSCOPY OF MULTIPLE CORONARY ARTERIES USING LOW OSMOLAR CONTRAST: ICD-10-PCS | Performed by: INTERNAL MEDICINE

## 2025-04-02 PROCEDURE — C1874 STENT, COATED/COV W/DEL SYS: HCPCS | Performed by: INTERNAL MEDICINE

## 2025-04-02 PROCEDURE — 6360000004 HC RX CONTRAST MEDICATION: Performed by: INTERNAL MEDICINE

## 2025-04-02 PROCEDURE — 92943 PRQ TRLUML REVSC CH OCC ANT: CPT | Performed by: INTERNAL MEDICINE

## 2025-04-02 PROCEDURE — 99153 MOD SED SAME PHYS/QHP EA: CPT | Performed by: INTERNAL MEDICINE

## 2025-04-02 PROCEDURE — 2709999900 HC NON-CHARGEABLE SUPPLY: Performed by: INTERNAL MEDICINE

## 2025-04-02 PROCEDURE — C1753 CATH, INTRAVAS ULTRASOUND: HCPCS | Performed by: INTERNAL MEDICINE

## 2025-04-02 PROCEDURE — C1769 GUIDE WIRE: HCPCS | Performed by: INTERNAL MEDICINE

## 2025-04-02 PROCEDURE — C1894 INTRO/SHEATH, NON-LASER: HCPCS | Performed by: INTERNAL MEDICINE

## 2025-04-02 PROCEDURE — 93005 ELECTROCARDIOGRAM TRACING: CPT | Performed by: INTERNAL MEDICINE

## 2025-04-02 PROCEDURE — 85027 COMPLETE CBC AUTOMATED: CPT

## 2025-04-02 PROCEDURE — 99152 MOD SED SAME PHYS/QHP 5/>YRS: CPT | Performed by: INTERNAL MEDICINE

## 2025-04-02 PROCEDURE — 94760 N-INVAS EAR/PLS OXIMETRY 1: CPT

## 2025-04-02 DEVICE — EVEROLIMUS-ELUTING PLATINUM CHROMIUM CORONARY STENT SYSTEM
Type: IMPLANTABLE DEVICE | Status: FUNCTIONAL
Brand: SYNERGY™ XD

## 2025-04-02 RX ORDER — ATORVASTATIN CALCIUM 80 MG/1
80 TABLET, FILM COATED ORAL NIGHTLY
Status: DISCONTINUED | OUTPATIENT
Start: 2025-04-02 | End: 2025-04-03 | Stop reason: HOSPADM

## 2025-04-02 RX ORDER — SODIUM CHLORIDE 9 MG/ML
INJECTION, SOLUTION INTRAVENOUS PRN
Status: DISCONTINUED | OUTPATIENT
Start: 2025-04-02 | End: 2025-04-03 | Stop reason: HOSPADM

## 2025-04-02 RX ORDER — SODIUM CHLORIDE 9 MG/ML
INJECTION, SOLUTION INTRAVENOUS CONTINUOUS PRN
Status: COMPLETED | OUTPATIENT
Start: 2025-04-02 | End: 2025-04-02

## 2025-04-02 RX ORDER — SODIUM CHLORIDE 0.9 % (FLUSH) 0.9 %
5-40 SYRINGE (ML) INJECTION EVERY 12 HOURS SCHEDULED
Status: DISCONTINUED | OUTPATIENT
Start: 2025-04-02 | End: 2025-04-02 | Stop reason: HOSPADM

## 2025-04-02 RX ORDER — SODIUM CHLORIDE 0.9 % (FLUSH) 0.9 %
5-40 SYRINGE (ML) INJECTION PRN
Status: DISCONTINUED | OUTPATIENT
Start: 2025-04-02 | End: 2025-04-03 | Stop reason: HOSPADM

## 2025-04-02 RX ORDER — SODIUM CHLORIDE 0.9 % (FLUSH) 0.9 %
5-40 SYRINGE (ML) INJECTION EVERY 12 HOURS SCHEDULED
Status: DISCONTINUED | OUTPATIENT
Start: 2025-04-02 | End: 2025-04-03 | Stop reason: HOSPADM

## 2025-04-02 RX ORDER — HEPARIN SODIUM 1000 [USP'U]/ML
INJECTION, SOLUTION INTRAVENOUS; SUBCUTANEOUS PRN
Status: DISCONTINUED | OUTPATIENT
Start: 2025-04-02 | End: 2025-04-02 | Stop reason: HOSPADM

## 2025-04-02 RX ORDER — EPTIFIBATIDE 0.75 MG/ML
INJECTION, SOLUTION INTRAVENOUS CONTINUOUS PRN
Status: COMPLETED | OUTPATIENT
Start: 2025-04-02 | End: 2025-04-02

## 2025-04-02 RX ORDER — SODIUM CHLORIDE 0.9 % (FLUSH) 0.9 %
5-40 SYRINGE (ML) INJECTION PRN
Status: DISCONTINUED | OUTPATIENT
Start: 2025-04-02 | End: 2025-04-02 | Stop reason: HOSPADM

## 2025-04-02 RX ORDER — INSULIN LISPRO 100 [IU]/ML
0-8 INJECTION, SOLUTION INTRAVENOUS; SUBCUTANEOUS
Status: DISCONTINUED | OUTPATIENT
Start: 2025-04-02 | End: 2025-04-03 | Stop reason: HOSPADM

## 2025-04-02 RX ORDER — METOPROLOL SUCCINATE 25 MG/1
12.5 TABLET, EXTENDED RELEASE ORAL 2 TIMES DAILY
Status: DISCONTINUED | OUTPATIENT
Start: 2025-04-02 | End: 2025-04-03 | Stop reason: HOSPADM

## 2025-04-02 RX ORDER — INSULIN LISPRO 100 [IU]/ML
0-8 INJECTION, SOLUTION INTRAVENOUS; SUBCUTANEOUS ONCE
Status: DISCONTINUED | OUTPATIENT
Start: 2025-04-02 | End: 2025-04-03 | Stop reason: HOSPADM

## 2025-04-02 RX ORDER — SODIUM CHLORIDE 9 MG/ML
INJECTION, SOLUTION INTRAVENOUS CONTINUOUS
Status: DISCONTINUED | OUTPATIENT
Start: 2025-04-02 | End: 2025-04-03

## 2025-04-02 RX ORDER — CLOPIDOGREL BISULFATE 75 MG/1
75 TABLET ORAL DAILY
Status: DISCONTINUED | OUTPATIENT
Start: 2025-04-03 | End: 2025-04-03 | Stop reason: HOSPADM

## 2025-04-02 RX ORDER — INSULIN LISPRO 100 [IU]/ML
0-8 INJECTION, SOLUTION INTRAVENOUS; SUBCUTANEOUS
Status: DISCONTINUED | OUTPATIENT
Start: 2025-04-02 | End: 2025-04-02 | Stop reason: SDUPTHER

## 2025-04-02 RX ORDER — DEXTROSE MONOHYDRATE 100 MG/ML
INJECTION, SOLUTION INTRAVENOUS CONTINUOUS PRN
Status: DISCONTINUED | OUTPATIENT
Start: 2025-04-02 | End: 2025-04-03 | Stop reason: HOSPADM

## 2025-04-02 RX ORDER — ATORVASTATIN CALCIUM 80 MG/1
80 TABLET, FILM COATED ORAL NIGHTLY
Status: DISCONTINUED | OUTPATIENT
Start: 2025-04-02 | End: 2025-04-02 | Stop reason: SDUPTHER

## 2025-04-02 RX ORDER — CLOPIDOGREL BISULFATE 75 MG/1
75 TABLET ORAL DAILY
Status: DISCONTINUED | OUTPATIENT
Start: 2025-04-03 | End: 2025-04-02 | Stop reason: SDUPTHER

## 2025-04-02 RX ORDER — MIDAZOLAM HYDROCHLORIDE 1 MG/ML
INJECTION, SOLUTION INTRAMUSCULAR; INTRAVENOUS PRN
Status: DISCONTINUED | OUTPATIENT
Start: 2025-04-02 | End: 2025-04-02 | Stop reason: HOSPADM

## 2025-04-02 RX ORDER — ASPIRIN 81 MG/1
81 TABLET ORAL DAILY
Status: DISCONTINUED | OUTPATIENT
Start: 2025-04-03 | End: 2025-04-03 | Stop reason: HOSPADM

## 2025-04-02 RX ORDER — GLUCAGON 1 MG/ML
1 KIT INJECTION PRN
Status: DISCONTINUED | OUTPATIENT
Start: 2025-04-02 | End: 2025-04-03 | Stop reason: HOSPADM

## 2025-04-02 RX ORDER — ACETAMINOPHEN 325 MG/1
650 TABLET ORAL EVERY 4 HOURS PRN
Status: DISCONTINUED | OUTPATIENT
Start: 2025-04-02 | End: 2025-04-03 | Stop reason: HOSPADM

## 2025-04-02 RX ORDER — INSULIN GLARGINE 100 [IU]/ML
18 INJECTION, SOLUTION SUBCUTANEOUS DAILY
Status: DISCONTINUED | OUTPATIENT
Start: 2025-04-02 | End: 2025-04-03 | Stop reason: HOSPADM

## 2025-04-02 RX ORDER — ASPIRIN 81 MG/1
81 TABLET, CHEWABLE ORAL DAILY
Status: DISCONTINUED | OUTPATIENT
Start: 2025-04-03 | End: 2025-04-02 | Stop reason: SDUPTHER

## 2025-04-02 RX ORDER — LIDOCAINE HYDROCHLORIDE 10 MG/ML
INJECTION, SOLUTION INFILTRATION; PERINEURAL PRN
Status: DISCONTINUED | OUTPATIENT
Start: 2025-04-02 | End: 2025-04-02 | Stop reason: HOSPADM

## 2025-04-02 RX ORDER — CLOPIDOGREL 300 MG/1
600 TABLET, FILM COATED ORAL ONCE
Status: COMPLETED | OUTPATIENT
Start: 2025-04-02 | End: 2025-04-02

## 2025-04-02 RX ORDER — SODIUM CHLORIDE 9 MG/ML
INJECTION, SOLUTION INTRAVENOUS PRN
Status: DISCONTINUED | OUTPATIENT
Start: 2025-04-02 | End: 2025-04-02 | Stop reason: HOSPADM

## 2025-04-02 RX ORDER — EPTIFIBATIDE 2 MG/ML
INJECTION, SOLUTION INTRAVENOUS PRN
Status: DISCONTINUED | OUTPATIENT
Start: 2025-04-02 | End: 2025-04-02 | Stop reason: HOSPADM

## 2025-04-02 RX ORDER — IOPAMIDOL 755 MG/ML
INJECTION, SOLUTION INTRAVASCULAR PRN
Status: DISCONTINUED | OUTPATIENT
Start: 2025-04-02 | End: 2025-04-02 | Stop reason: HOSPADM

## 2025-04-02 RX ORDER — ASPIRIN 325 MG
325 TABLET ORAL ONCE
Status: COMPLETED | OUTPATIENT
Start: 2025-04-02 | End: 2025-04-02

## 2025-04-02 RX ORDER — EPTIFIBATIDE 0.75 MG/ML
2 INJECTION, SOLUTION INTRAVENOUS CONTINUOUS
Status: ACTIVE | OUTPATIENT
Start: 2025-04-02 | End: 2025-04-02

## 2025-04-02 RX ADMIN — EPTIFIBATIDE 2 MCG/KG/MIN: 75 INJECTION INTRAVENOUS at 12:44

## 2025-04-02 RX ADMIN — ACETAMINOPHEN 650 MG: 325 TABLET ORAL at 21:25

## 2025-04-02 RX ADMIN — CLOPIDOGREL BISULFATE 600 MG: 300 TABLET, FILM COATED ORAL at 08:42

## 2025-04-02 RX ADMIN — SODIUM CHLORIDE: 9 INJECTION, SOLUTION INTRAVENOUS at 20:54

## 2025-04-02 RX ADMIN — ASPIRIN 325 MG: 325 TABLET ORAL at 08:42

## 2025-04-02 RX ADMIN — SODIUM CHLORIDE: 9 INJECTION, SOLUTION INTRAVENOUS at 12:43

## 2025-04-02 RX ADMIN — INSULIN LISPRO 2 UNITS: 100 INJECTION, SOLUTION INTRAVENOUS; SUBCUTANEOUS at 17:06

## 2025-04-02 RX ADMIN — METOPROLOL SUCCINATE 12.5 MG: 25 TABLET, EXTENDED RELEASE ORAL at 21:24

## 2025-04-02 RX ADMIN — ATORVASTATIN CALCIUM 80 MG: 80 TABLET, FILM COATED ORAL at 21:26

## 2025-04-02 RX ADMIN — SODIUM CHLORIDE 75 ML/HR: 0.9 INJECTION, SOLUTION INTRAVENOUS at 08:36

## 2025-04-02 RX ADMIN — INSULIN GLARGINE 18 UNITS: 100 INJECTION, SOLUTION SUBCUTANEOUS at 13:59

## 2025-04-02 RX ADMIN — SODIUM CHLORIDE, PRESERVATIVE FREE 10 ML: 5 INJECTION INTRAVENOUS at 20:54

## 2025-04-02 ASSESSMENT — PAIN SCALES - GENERAL
PAINLEVEL_OUTOF10: 0
PAINLEVEL_OUTOF10: 6
PAINLEVEL_OUTOF10: 0
PAINLEVEL_OUTOF10: 6

## 2025-04-02 ASSESSMENT — PAIN DESCRIPTION - PAIN TYPE: TYPE: ACUTE PAIN

## 2025-04-02 ASSESSMENT — PAIN - FUNCTIONAL ASSESSMENT: PAIN_FUNCTIONAL_ASSESSMENT: ACTIVITIES ARE NOT PREVENTED

## 2025-04-02 ASSESSMENT — PAIN DESCRIPTION - DESCRIPTORS: DESCRIPTORS: SHARP;SORE

## 2025-04-02 ASSESSMENT — PAIN DESCRIPTION - ORIENTATION: ORIENTATION: LEFT;ANTERIOR;MID

## 2025-04-02 ASSESSMENT — PAIN DESCRIPTION - LOCATION: LOCATION: CHEST;ARM

## 2025-04-02 NOTE — FLOWSHEET NOTE
04/02/25 1210   Vitals   Temp 97.6 °F (36.4 °C)   Temp Source Oral   Pulse 54   Heart Rate Source Monitor   Respirations 18   /75   MAP (Calculated) 93   BP Location Right upper arm   BP Upper/Lower Upper   BP Method Automatic   Patient Position Supine  (1350 when patient can sit up)   Pain Assessment   Pain Assessment 0-10   Pain Level 0   Patient's Stated Pain Goal 0 - No pain   Care Plan - Pain Goals   Verbalizes/displays adequate comfort level or baseline comfort level Encourage patient to monitor pain and request assistance;Assess pain using appropriate pain scale;Notify Licensed Independent Practitioner if interventions unsuccessful or patient reports new pain;Implement non-pharmacological measures as appropriate and evaluate response;Administer analgesics based on type and severity of pain and evaluate response;Consider cultural and social influences on pain and pain management   Opioid-Induced Sedation   POSS Score S   RASS   De León Agitation Sedation Scale (RASS) -1   Oxygen Therapy   SpO2 100 %   Pulse via Oximetry 57 beats per minute   Pulse Oximeter Device Mode Continuous   O2 Device None (Room air)     Patient admitted from cath lab.  Educated the patient on lying flat and the use of the call light.  Patient verbalized understanding.  VS listed above.  Bilateral groin dressing c/d/I.  Will review MD orders.

## 2025-04-02 NOTE — H&P
not hesitate to contact me if you have any questions.    Jaswinder Luevano MD, MPH    [facility]  [address], [address]   [place] [address]  Ph: [phone]  Fax: [phone]    4/2/2025 9:37 AM

## 2025-04-02 NOTE — INTERDISCIPLINARY ROUNDS
Spiritual Health History and Assessment/Progress Note  [facility]    Spiritual/Emotional Needs,  ,  ,      Name: Landon Ordoñez MRN: [id number]    Age: 49 y.o.     Sex: male   Language: English   Quaker: None   CAD (coronary artery disease)     Date: 4/2/2025            Total Time Calculated: 7 min              Spiritual Assessment began in [facility]        Referral/Consult From: Rounding   Encounter Overview/Reason: Spiritual/Emotional Needs  Service Provided For: Patient    Sandra, Belief, Meaning:   Patient Other: Patient no sandra practice system.    Family/Friends No family/friends present      Importance and Influence:  Patient has no beliefs influential to healthcare decision-making identified during this visit  Family/Friends No family/friends present    Community:  Patient Other: Patint  family and child is his community support system  Family/Friends No family/friends present    Assessment and Plan of Care:     Patient Interventions include: Affirmed coping skills/support systems and Facilitated life review and/ or legacy  Family/Friends Interventions include: No family/friends present    Patient Plan of Care: No spiritual needs identified for follow-up and Spiritual Care available upon further referral  Family/Friends Plan of Care: No family/friends present    Electronically signed by Chaplain Kalie on 4/2/2025 at 2:56 PM

## 2025-04-03 VITALS
RESPIRATION RATE: 19 BRPM | BODY MASS INDEX: 28.81 KG/M2 | TEMPERATURE: 98.4 F | DIASTOLIC BLOOD PRESSURE: 74 MMHG | WEIGHT: 179.23 LBS | HEART RATE: 96 BPM | OXYGEN SATURATION: 98 % | SYSTOLIC BLOOD PRESSURE: 125 MMHG | HEIGHT: 66 IN

## 2025-04-03 LAB
ANION GAP SERPL CALCULATED.3IONS-SCNC: 8 MMOL/L (ref 3–16)
BUN SERPL-MCNC: 11 MG/DL (ref 7–20)
CALCIUM SERPL-MCNC: 8.9 MG/DL (ref 8.3–10.6)
CHLORIDE SERPL-SCNC: 104 MMOL/L (ref 99–110)
CO2 SERPL-SCNC: 27 MMOL/L (ref 21–32)
CREAT SERPL-MCNC: 0.7 MG/DL (ref 0.9–1.3)
ECHO BSA: 1.98 M2
GFR SERPLBLD CREATININE-BSD FMLA CKD-EPI: >90 ML/MIN/{1.73_M2}
GLUCOSE BLD-MCNC: 127 MG/DL (ref 70–99)
GLUCOSE BLD-MCNC: 280 MG/DL (ref 70–99)
GLUCOSE SERPL-MCNC: 103 MG/DL (ref 70–99)
PERFORMED ON: ABNORMAL
PERFORMED ON: ABNORMAL
POC ACT LR: >400 SEC
POTASSIUM SERPL-SCNC: 3.7 MMOL/L (ref 3.5–5.1)
SODIUM SERPL-SCNC: 139 MMOL/L (ref 136–145)

## 2025-04-03 PROCEDURE — 80048 BASIC METABOLIC PNL TOTAL CA: CPT

## 2025-04-03 PROCEDURE — 99232 SBSQ HOSP IP/OBS MODERATE 35: CPT | Performed by: NURSE PRACTITIONER

## 2025-04-03 PROCEDURE — 36415 COLL VENOUS BLD VENIPUNCTURE: CPT

## 2025-04-03 PROCEDURE — 6370000000 HC RX 637 (ALT 250 FOR IP): Performed by: INTERNAL MEDICINE

## 2025-04-03 PROCEDURE — 94760 N-INVAS EAR/PLS OXIMETRY 1: CPT

## 2025-04-03 PROCEDURE — 2500000003 HC RX 250 WO HCPCS: Performed by: INTERNAL MEDICINE

## 2025-04-03 PROCEDURE — 2580000003 HC RX 258: Performed by: INTERNAL MEDICINE

## 2025-04-03 RX ADMIN — CLOPIDOGREL BISULFATE 75 MG: 75 TABLET, FILM COATED ORAL at 09:41

## 2025-04-03 RX ADMIN — SODIUM CHLORIDE, PRESERVATIVE FREE 10 ML: 5 INJECTION INTRAVENOUS at 09:43

## 2025-04-03 RX ADMIN — INSULIN GLARGINE 18 UNITS: 100 INJECTION, SOLUTION SUBCUTANEOUS at 09:42

## 2025-04-03 RX ADMIN — ASPIRIN 81 MG: 81 TABLET, COATED ORAL at 09:41

## 2025-04-03 RX ADMIN — SODIUM CHLORIDE: 9 INJECTION, SOLUTION INTRAVENOUS at 09:46

## 2025-04-03 RX ADMIN — METOPROLOL SUCCINATE 12.5 MG: 25 TABLET, EXTENDED RELEASE ORAL at 09:42

## 2025-04-03 RX ADMIN — INSULIN LISPRO 4 UNITS: 100 INJECTION, SOLUTION INTRAVENOUS; SUBCUTANEOUS at 12:01

## 2025-04-03 ASSESSMENT — PAIN SCALES - GENERAL
PAINLEVEL_OUTOF10: 0
PAINLEVEL_OUTOF10: 0

## 2025-04-03 NOTE — CARE COORDINATION
04/03/25 1209   Service Assessment   Patient Orientation Alert and Oriented   Cognition Alert   History Provided By Patient   Primary Caregiver Self   Accompanied By/Relationship no one   Support Systems Children;Spouse/Significant Other   Patient's Healthcare Decision Maker is: Legal Next of Kin   PCP Verified by CM Yes  (Dr Pate  last 2-2025)   Last Visit to PCP Within last 3 months   Prior Functional Level Independent in ADLs/IADLs   Current Functional Level Independent in ADLs/IADLs   Can patient return to prior living arrangement Yes   Ability to make needs known: Good   Family able to assist with home care needs: Yes   Would you like for me to discuss the discharge plan with any other family members/significant others, and if so, who? No   Financial Resources Other (Comment)  (Employer insurance)   Community Resources None   Discharge Planning   Type of Residence House   Living Arrangements Spouse/Significant Other;Children   Current Services Prior To Admission None   Potential Assistance Needed N/A   DME Ordered? No   Potential Assistance Purchasing Medications No   Type of Home Care Services None   Patient expects to be discharged to: House   One/Two Story Residence One story   History of falls? 0   Services At/After Discharge   Transition of Care Consult (CM Consult) N/A   Services At/After Discharge None    Resource Information Provided? No   Mode of Transport at Discharge Other (see comment)  (family)   Confirm Follow Up Transport Family

## 2025-04-03 NOTE — PLAN OF CARE
Problem: Safety - Adult  Goal: Free from fall injury  4/3/2025 0915 by Janee Rhodes RN  Outcome: Adequate for Discharge  4/2/2025 2156 by Skyler Pierre RN  Outcome: Progressing     Problem: Pain  Goal: Verbalizes/displays adequate comfort level or baseline comfort level  4/3/2025 0915 by Janee Rhodes RN  Outcome: Adequate for Discharge  Flowsheets (Taken 4/3/2025 0000 by Skyler Pierre, RN)  Verbalizes/displays adequate comfort level or baseline comfort level:   Encourage patient to monitor pain and request assistance   Assess pain using appropriate pain scale   Administer analgesics based on type and severity of pain and evaluate response   Implement non-pharmacological measures as appropriate and evaluate response   Consider cultural and social influences on pain and pain management   Notify Licensed Independent Practitioner if interventions unsuccessful or patient reports new pain  4/2/2025 2156 by Skyler Pierre RN  Outcome: Progressing  Flowsheets (Taken 4/2/2025 2000)  Verbalizes/displays adequate comfort level or baseline comfort level:   Encourage patient to monitor pain and request assistance   Assess pain using appropriate pain scale   Implement non-pharmacological measures as appropriate and evaluate response   Consider cultural and social influences on pain and pain management   Notify Licensed Independent Practitioner if interventions unsuccessful or patient reports new pain   Administer analgesics based on type and severity of pain and evaluate response     Problem: ABCDS Injury Assessment  Goal: Absence of physical injury  4/3/2025 0915 by Janee Rhodes RN  Outcome: Adequate for Discharge  4/2/2025 2156 by Skyler Pierre RN  Outcome: Progressing

## 2025-04-03 NOTE — CARE COORDINATION
Case Management Assessment  Initial Evaluation    Date/Time of Evaluation: 4/3/2025 12:13 PM  Assessment Completed by: MELISSA Zamora    If patient is discharged prior to next notation, then this note serves as note for discharge by case management.    Patient Name: Landon Ordoñez                   YOB: 1975  Diagnosis: CAD (coronary artery disease) [I25.10]  Unstable angina (HCC) [I20.0]                   Date / Time: 4/2/2025  7:47 AM    Patient Admission Status: Inpatient   Readmission Risk (Low < 19, Mod (19-27), High > 27): Readmission Risk Score: 15    Current PCP: Felipe Pate MD  PCP verified by CM? Yes (Dr Pate  last 2-2025)    Chart Reviewed: Yes      History Provided by: Patient  Patient Orientation: Alert and Oriented    Patient Cognition: Alert    Hospitalization in the last 30 days (Readmission):  No    If yes, Readmission Assessment in CM Navigator will be completed.    Advance Directives:      Code Status: Full Code   Patient's Primary Decision Maker is: Legal Next of Kin      Discharge Planning:    Patient lives with: Spouse/Significant Other, Children Type of Home: House  Primary Care Giver: Self  Patient Support Systems include: Children, Spouse/Significant Other   Current Financial resources: Other (Comment) (Employer insurance)  Current community resources: None  Current services prior to admission: None            Current DME:              Type of Home Care services:  None    ADLS  Prior functional level: Independent in ADLs/IADLs  Current functional level: Independent in ADLs/IADLs    PT AM-PAC:   /24  OT AM-PAC:   /24    Family can provide assistance at DC: Yes  Would you like Case Management to discuss the discharge plan with any other family members/significant others, and if so, who? No  Plans to Return to Present Housing: Yes  Other Identified Issues/Barriers to RETURNING to current housing: none  Potential Assistance needed at discharge: N/A

## 2025-04-03 NOTE — PLAN OF CARE
Problem: Safety - Adult  Goal: Free from fall injury  4/2/2025 2156 by Skyler Pierre RN  Outcome: Progressing  4/2/2025 1418 by Sadaf Thomas RN  Outcome: Progressing     Problem: Pain  Goal: Verbalizes/displays adequate comfort level or baseline comfort level  4/2/2025 2156 by Skyler Pierre RN  Outcome: Progressing  Flowsheets (Taken 4/2/2025 2000)  Verbalizes/displays adequate comfort level or baseline comfort level:   Encourage patient to monitor pain and request assistance   Assess pain using appropriate pain scale   Implement non-pharmacological measures as appropriate and evaluate response   Consider cultural and social influences on pain and pain management   Notify Licensed Independent Practitioner if interventions unsuccessful or patient reports new pain   Administer analgesics based on type and severity of pain and evaluate response  4/2/2025 1418 by Sadaf Thomas RN  Outcome: Progressing  Flowsheets (Taken 4/2/2025 1210)  Verbalizes/displays adequate comfort level or baseline comfort level:   Encourage patient to monitor pain and request assistance   Assess pain using appropriate pain scale   Notify Licensed Independent Practitioner if interventions unsuccessful or patient reports new pain   Implement non-pharmacological measures as appropriate and evaluate response   Administer analgesics based on type and severity of pain and evaluate response   Consider cultural and social influences on pain and pain management     Problem: ABCDS Injury Assessment  Goal: Absence of physical injury  4/2/2025 2156 by Skyler Pierre RN  Outcome: Progressing  4/2/2025 1418 by Sadaf Thomas RN  Outcome: Progressing

## 2025-04-03 NOTE — PLAN OF CARE
Problem: Safety - Adult  Goal: Free from fall injury  4/3/2025 1414 by Janee Rhodes RN  Outcome: Completed  4/3/2025 0915 by Janee Rhodes RN  Outcome: Adequate for Discharge     Problem: Pain  Goal: Verbalizes/displays adequate comfort level or baseline comfort level  4/3/2025 1414 by Janee Rhodes RN  Outcome: Completed  4/3/2025 0915 by Janee Rhodes RN  Outcome: Adequate for Discharge  Flowsheets (Taken 4/3/2025 0000 by Skyler Pierre, RN)  Verbalizes/displays adequate comfort level or baseline comfort level:   Encourage patient to monitor pain and request assistance   Assess pain using appropriate pain scale   Administer analgesics based on type and severity of pain and evaluate response   Implement non-pharmacological measures as appropriate and evaluate response   Consider cultural and social influences on pain and pain management   Notify Licensed Independent Practitioner if interventions unsuccessful or patient reports new pain     Problem: ABCDS Injury Assessment  Goal: Absence of physical injury  4/3/2025 1414 by Janee Rhodes RN  Outcome: Completed  4/3/2025 0915 by Janee Rhodes RN  Outcome: Adequate for Discharge

## 2025-04-03 NOTE — PROGRESS NOTES
2692  Dr Luevano at bedside discussing with patient the need to take medication on a regular basis and plan of care for after procedure  
4 Eyes Skin Assessment     NAME:  Landon Ordoñez  YOB: 1975  MEDICAL RECORD NUMBER:  [id number]    The patient is being assessed for  Admission    I agree that at least one RN has performed a thorough Head to Toe Skin Assessment on the patient. ALL assessment sites listed below have been assessed.      Areas assessed by both nurses:    Head, Face, Ears, Shoulders, Back, Chest, Arms, Elbows, Hands, Sacrum. Buttock, Coccyx, Ischium, Legs. Feet and Heels, and Under Medical Devices         Does the Patient have a Wound? No noted wound(s)       Leonel Prevention initiated by RN: No  Wound Care Orders initiated by RN: No    Pressure Injury (Stage 3,4, Unstageable, DTI, NWPT, and Complex wounds) if present, place Wound referral order by RN under : No    New Ostomies, if present place, Ostomy referral order under : No     Nurse 1 eSignature: Electronically signed by Sadaf Thomas RN on 4/2/25 at 12:48 PM EDT    **SHARE this note so that the co-signing nurse can place an eSignature**    Nurse 2 eSignature: Electronically signed by Brianna Tineo RN on 4/2/25 at 12:58 PM EDT    
Discharge instructions reviewed with patient. Patient verbalized understanding. Stent card given to patient. All home medications have been reviewed, questions answered and patient voiced understanding. All medication side effects reviewed and patient and family verbalized understanding. Follow up appointment reviewed.Patient given prescriptions, discharge instructions, and appointment times. Patient discharged to home with family via private car. Taken to lobby via wheelchair.                  
NAME:  Landon Ordoñez  YOB: 1975  MEDICAL RECORD NUMBER:  [id number]    Shift Summary: Pt had one episode of CP with pain radiating down L arm. EKG obtained  no acute changes, pain managed and relieved with tylenol x 1 . Pt bilateral groin sites clean and intact, no reported pain there. Stent cards in chart.  Slept throughout night without issue     Family updated: Yes:  pt self updating     Rhythm: Normal Sinus Rhythm     Most recent vitals:   Visit Vitals  /84   Pulse 75   Temp 98.2 °F (36.8 °C) (Oral)   Resp 15   Ht 1.676 m (5' 6\")   Wt 81.3 kg (179 lb 3.7 oz)   SpO2 98%   BMI 28.93 kg/m²           No data found.    No data found.      Respiratory support needed (if any):  - RA    Admission weight Weight - Scale: 84.4 kg (186 lb) (04/02/25 0821)    Today's weight    Wt Readings from Last 1 Encounters:   04/03/25 81.3 kg (179 lb 3.7 oz)        Severino need assessed each shift: N/A - no severino present  UOP >30ml/hr: YES  Last documented BM (in last 48 hrs):  Patient Vitals for the past 48 hrs:   Last BM (including prior to admit)   04/02/25 1220 04/01/25 04/02/25 2000 04/01/25                Restraints (in use currently or dc'd in last 12 hrs): No    Order current and documentation up to date? No    Lines/Drains reviewed @ bedside.  Peripheral IV 04/02/25 Right;Dorsal Forearm (Active)   Number of days: 0         Drip rates at handoff:    sodium chloride 75 mL/hr at 04/03/25 0623    sodium chloride      dextrose         Lab Data:   CBC:   Recent Labs     04/02/25  0834   WBC 6.0   HGB 14.6   HCT 42.9   MCV 86.2        BMP:    Recent Labs     04/02/25  0834 04/03/25  0432    139   K 3.9 3.7   CO2 25 27   BUN 17 11   CREATININE 0.8* 0.7*     ABG: No results for input(s): \"PHART\", \"SGQ2DQY\", \"PO2ART\" in the last 72 hours.    Any consults during the shift? No    Any signed and held orders to be released?  No        4 Eyes Skin Assessment       The patient is being assessed for  Shift 
NAME:  Landon Ordoñez  YOB: 1975  MEDICAL RECORD NUMBER:  [id number]    Shift Summary:   -Groin incision c/d/I  -PVS intact.  -VS remained stable.    -Urine output adequate for shift.     Family updated: No, patient was able to updated his family via cell phone    Rhythm: Normal Sinus Rhythm     Most recent vitals:   Visit Vitals  /86   Pulse 60   Temp 97 °F (36.1 °C) (Oral)   Resp 19   Ht 1.676 m (5' 6\")   Wt 77.7 kg (171 lb 4.8 oz)   SpO2 99%   BMI 27.65 kg/m²           No data found.    No data found.      Respiratory support needed (if any):  - RA    Admission weight Weight - Scale: 84.4 kg (186 lb) (04/02/25 0821)    Today's weight    Wt Readings from Last 1 Encounters:   04/02/25 77.7 kg (171 lb 4.8 oz)        Severino need assessed each shift: N/A - no severino present  UOP >30ml/hr: YES  Last documented BM (in last 48 hrs):  Patient Vitals for the past 48 hrs:   Last BM (including prior to admit)   04/02/25 1220 04/01/25                Restraints (in use currently or dc'd in last 12 hrs): No    Order current and documentation up to date? No    Lines/Drains reviewed @ bedside.  Peripheral IV 04/02/25 Right;Dorsal Forearm (Active)   Number of days: 0         Drip rates at handoff:    sodium chloride 75 mL/hr at 04/02/25 1707    sodium chloride      dextrose         Lab Data:   CBC:   Recent Labs     04/02/25  0834   WBC 6.0   HGB 14.6   HCT 42.9   MCV 86.2        BMP:    Recent Labs     04/02/25  0834      K 3.9   CO2 25   BUN 17   CREATININE 0.8*     ABG: No results for input(s): \"PHART\", \"ZFE0YIF\", \"PO2ART\" in the last 72 hours.    Any consults during the shift? No    Any signed and held orders to be released?  No        4 Eyes Skin Assessment       The patient is being assessed for  Shift Handoff    I agree that at least one RN has performed a thorough Head to Toe Skin Assessment on the patient. ALL assessment sites listed below have been assessed.      Areas assessed by both 
Oral Nightly    insulin glargine  18 Units SubCUTAneous Daily    metoprolol succinate  12.5 mg Oral BID    sodium chloride flush  5-40 mL IntraVENous 2 times per day    clopidogrel  75 mg Oral Daily    insulin lispro  0-8 Units SubCUTAneous TID WC    insulin lispro  0-8 Units SubCUTAneous Once      sodium chloride 75 mL/hr at 04/03/25 0946    sodium chloride      dextrose       sodium chloride flush, sodium chloride, acetaminophen, glucose, dextrose bolus **OR** dextrose bolus, glucagon (rDNA), dextrose    Lab Data: I have reviewed all labs below today.   CBC:   Recent Labs     04/02/25  0834   WBC 6.0   HGB 14.6   HCT 42.9   MCV 86.2        BMP:   Recent Labs     04/02/25  0834 04/03/25  0432    139   K 3.9 3.7    104   CO2 25 27   BUN 17 11   CREATININE 0.8* 0.7*     GLUCOSE:   Recent Labs     04/02/25  0834 04/03/25  0432   GLUCOSE 222* 103*     LIVER PROFILE:   Lab Results   Component Value Date/Time    AST 49 07/20/2024 04:21 AM    ALT 40 07/20/2024 04:21 AM    LIPASE 25.0 06/08/2023 11:37 AM    BILIDIR <0.2 07/20/2024 04:21 AM    BILITOT 0.7 07/20/2024 04:21 AM    ALKPHOS 138 07/20/2024 04:21 AM     PT/INR:   Lab Results   Component Value Date/Time    PROTIME 13.1 01/21/2025 07:10 PM    INR 0.97 01/21/2025 07:10 PM    INR 1.39 07/17/2024 05:30 PM    INR 1.37 07/17/2024 04:34 PM     APTT:   Lab Results   Component Value Date/Time    APTT 44.2 01/22/2025 06:45 PM     Pro-BNP:  No results found for: \"PROBNP\"  Parameters:   > 450 pg/mL under age 50  > 900 pg/mL ages 50-75  > 1800 pg/mL over age 75      FASTING LIPID PANEL:  Lab Results   Component Value Date/Time    CHOL 310 07/15/2024 02:38 AM    HDL 49 07/15/2024 02:38 AM    TRIG 306 07/15/2024 02:38 AM     Cardiac Enzymes:   Troponin, High Sensitivity (ng/L)   Date Value   07/16/2024 2,745 (H)   07/15/2024 9,410 (H)   07/15/2024 311 (H)   07/14/2024 74 (H)   07/14/2024 49 (H)   07/14/2024 31 (H)      A1C:   Hemoglobin A1C   Date Value Ref

## 2025-04-03 NOTE — PROGRESS NOTES
[facility]  [place] [address]  [phone]    PrimaryCare Doctor:  Felipe Pate MD  Primary Cardiologist: Ashok/Chloé    Chief Complaint   Patient presents with    Follow-up     PT concerned with occasional stabbing pains on left side of chest. Chest also still numb from procedure.         History of Present Illness:  Landon Ordoñez is a 49 y.o. male with PMHCAD/ S/P CABG LIMA to LAD SVG to OM2, SBG to PDA, SVG to DIAG  7/2024 7/2024, HF/EF 35%, HLP, DM. No smoker.      Adm to [facility] for elective LHC.   SELIN to prox LAD and DG.     Patient presents to [facility] cardiology for follow up for CAD s/p LHC/ PCI    L side stabbing pain. 4/10. Lasts a few seconds. Occurs at rest.   He is able to exert himself without chest pain.   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    Denies LH, syncope, palpitations    Review of Systems:   General: Denies fever, chills, fatigue, weakness  RESP: Denies cough, sputum, dyspnea, wheeze, snoring  CARD: Denies palpitations,  murmur  GI:Denies nausea, vomiting, heartburn, loss of appetite, change in bowels  VASC: Denies claudication, leg cramps  NEURO: Denies numbness, tingling, weakness,change in mood or memory  HEME: Denies abn bruising, bleeding, anemia    /82 (BP Site: Left Upper Arm, Patient Position: Sitting)   Pulse 71   Ht 1.676 m (5' 6\")   Wt 82.6 kg (182 lb)   SpO2 98%   BMI 29.38 kg/m²   Wt Readings from Last 3 Encounters:   04/17/25 82.6 kg (182 lb)   04/03/25 81.3 kg (179 lb 3.7 oz)   03/06/25 81.2 kg (179 lb)       Physical Exam:  GEN: Appears well, no acute distress  SKIN: Pink, warm, dry.   LUNG: AP diameter normal. Clear bilateral. No wheeze, rales, or ronchi. Respiratory effort normal.  HEART: S1S2 A/R. No JVD. No carotid bruit. No murmur, rub or gallop.  ABD: Soft, nontender. +BS X 4 quads. No hepatomegaly.   EXT: Radial and pedal pulses 2+ and

## 2025-04-03 NOTE — DISCHARGE INSTRUCTIONS
Hold Metformin until 4/5/2025    Post Angiogram/ Intervention Discharge Instructions    Activity:  You may drive in 24hrs unless instructed by your doctor   Resume normal activity in one week  Avoid lifting, pushing, or pulling more than 10 lbs. For one week. (A gallon of milk is 7 lbs)  Limit stair climbing to once a day for two weeks.  You may walk at an easy pace on ground level.  Plan rest periods during the day.  Avoid tension and stress.  Learn to manage your stress.  Avoid work that increases muscle tension.        (straining with bowel movements, moving furniture)    Wound Care:  You may shower, but no bathtubs, pools, or hot tubs for one week.  Inspect area daily.  Normal observations:  Soreness and tenderness that may last for one week, mild pink to red oozing from incision site for up to 24 hrs after discharge,    bruising that could last up to two weeks.  Clean with soap and water.  NO lotion or powder.  Dry area thoroughly.  Apply band    aide for 48 hrs.    Nutrition:   Low fat, low salt diet (guidelines for Heart Healthy eating)  Limit caffeine to 1-2 cups per day (coffee, tea, chocolate, soda)  Eat high fiber to avoid constipation and straining during bowel movements (fresh veggies and fruit, whole grain)  Limit alcohol to two servings a day ( 8 oz beer, 1 oz liquor, 4 oz wine)    Depression:  It is not unusual to have feelings of anxiety, fear, or depression after your procedure.  If you need help with these feelings, call your primary care physician.  There are medications to help you and healing usually occurs sooner if you are not depressed.     Call your Doctor if you have:  Increased shortness of breath, weakness, or increased fatigue  A fast or a slow heartbeat  Problems or questions with your medications  Bleeding that is not stopped after holding pressure for 10 min  Feeling lightheaded or dizzy  Increased swelling or bruising of the groin or leg  Unusual pain, numbness or tingling at the

## 2025-04-03 NOTE — DISCHARGE SUMMARY
[facility]    DISCHARGE SUMMARY      Patient ID:  Landon Ordoñez  [id number] 49 y.o. 1975    Admit date: 4/2/2025    Discharge date:  4/3/2025    Admitting Physician: Jaswinder Luevano MD     Discharge Physician: NIA Lopez - CNP     Admission Diagnoses: CAD (coronary artery disease) [I25.10]  Unstable angina (HCC) [I20.0]    Discharge Diagnoses:   Patient Active Problem List   Diagnosis    NSTEMI (non-ST elevated myocardial infarction) (HCC)    Chest pain    Coronary artery disease, unspecified vessel or lesion type, unspecified whether angina present, unspecified whether native or transplanted heart    CAD, multiple vessel    Dyslipidemia    Cardiomyopathy    S/P CABG x 4    CAD in native artery    Hx of CABG    Unstable angina (HCC)    Abnormal nuclear stress test    CAD (coronary artery disease)        Discharged Condition: good    Hospital Course: Landon Ordoñez was admitted PMH CAD/CABG to LAD 7/2024, HF/EF 35%, HLP, DM.     Adm to [facility] for elective LHC.   SELIN to prox LAD and DG.     Post procedure had some atypical L side chest discomfort, now resolved  Ambulates in love.   Bilateral groin sites CDI.   Denies CP or SOB    1.) CAD:   S/P CABG LIMA to LAD SVG to OM2, SBG to PDA, SVG to DIAG  7/2024  S/P LHC with SELIN to prox LAD and DIAG 4/2 with Chloé  Asa, plavix, BB, lipitor  Risk factor management: high blood pressure, high cholesterol, Diabetes, smoking, obesity, family hx  Lifestyle modification: Heart healthy diet, regular exercise, weight loss, smoking cessation, stress reduction  Cardiac Rehab: Phase 2     2.) Chronic HfrEF:   Recovered EF 50-55%  NYHA Class III  LVEF 35%  Euvolemic resume BB     3.) Hypertension:   Goal BP <130/80.  Non pharmacologic interventions include:   -weight loss  -heart healthy low sodium and low fat diet that consist of mostly fruits, vegetables and grains (Dash diet)  -limited amount of alcohol (no more than 1 drink/day for women, 2 drinks/day for

## 2025-04-04 LAB
EKG ATRIAL RATE: 68 BPM
EKG DIAGNOSIS: NORMAL
EKG P AXIS: 51 DEGREES
EKG P-R INTERVAL: 150 MS
EKG Q-T INTERVAL: 410 MS
EKG QRS DURATION: 90 MS
EKG QTC CALCULATION (BAZETT): 435 MS
EKG R AXIS: -57 DEGREES
EKG T AXIS: 103 DEGREES
EKG VENTRICULAR RATE: 68 BPM

## 2025-04-17 ENCOUNTER — OFFICE VISIT (OUTPATIENT)
Dept: CARDIOLOGY CLINIC | Age: 50
End: 2025-04-17
Payer: COMMERCIAL

## 2025-04-17 VITALS
BODY MASS INDEX: 29.25 KG/M2 | OXYGEN SATURATION: 98 % | DIASTOLIC BLOOD PRESSURE: 82 MMHG | HEIGHT: 66 IN | HEART RATE: 71 BPM | SYSTOLIC BLOOD PRESSURE: 110 MMHG | WEIGHT: 182 LBS

## 2025-04-17 DIAGNOSIS — I50.22 CHRONIC SYSTOLIC HEART FAILURE (HCC): ICD-10-CM

## 2025-04-17 DIAGNOSIS — I25.10 CAD, MULTIPLE VESSEL: ICD-10-CM

## 2025-04-17 DIAGNOSIS — I21.4 NSTEMI (NON-ST ELEVATED MYOCARDIAL INFARCTION) (HCC): Primary | ICD-10-CM

## 2025-04-17 PROCEDURE — 93000 ELECTROCARDIOGRAM COMPLETE: CPT | Performed by: NURSE PRACTITIONER

## 2025-04-17 PROCEDURE — 99214 OFFICE O/P EST MOD 30 MIN: CPT | Performed by: NURSE PRACTITIONER

## 2025-05-15 ENCOUNTER — OFFICE VISIT (OUTPATIENT)
Dept: CARDIOLOGY CLINIC | Age: 50
End: 2025-05-15
Payer: COMMERCIAL

## 2025-05-15 VITALS
HEIGHT: 66 IN | HEART RATE: 60 BPM | SYSTOLIC BLOOD PRESSURE: 130 MMHG | BODY MASS INDEX: 29.38 KG/M2 | OXYGEN SATURATION: 98 % | DIASTOLIC BLOOD PRESSURE: 74 MMHG

## 2025-05-15 DIAGNOSIS — I25.10 CORONARY ARTERY DISEASE INVOLVING NATIVE CORONARY ARTERY OF NATIVE HEART WITHOUT ANGINA PECTORIS: Primary | ICD-10-CM

## 2025-05-15 DIAGNOSIS — Z95.1 HX OF CABG: ICD-10-CM

## 2025-05-15 PROCEDURE — 99214 OFFICE O/P EST MOD 30 MIN: CPT | Performed by: INTERNAL MEDICINE

## 2025-05-15 RX ORDER — NITROGLYCERIN 0.4 MG/1
0.4 TABLET SUBLINGUAL EVERY 5 MIN PRN
Qty: 25 TABLET | Refills: 3 | Status: SHIPPED | OUTPATIENT
Start: 2025-05-15

## 2025-05-15 RX ORDER — CLOPIDOGREL BISULFATE 75 MG/1
75 TABLET ORAL DAILY
Qty: 90 TABLET | Refills: 3 | Status: SHIPPED | OUTPATIENT
Start: 2025-05-15

## 2025-05-15 NOTE — PROGRESS NOTES
[facility]      Cardiology Progress Note    Landon Ordoñez  1975    May 14, 2025        CC: \"    HPI:  The patient is 49 y.o. male with a past medical history significant for transferring care to [facility]. 7/15/24 per Dr. Damon VILLAFUERTE and SOB and completed a [facility] s/p NSTEMI, ACS revealing multivessel disease and therefore referred for CABG. LVEF 35%. 7/17/24 he is s/p CABG x4 LIMA to LAD, SVG to OM2, SBG to PDA, SVG to DIAG. TCPB, AYSHA, doppler flow verification of bypass grafts with medistim probe 3.0, EVH of right saphenous vein, insertion of temporary atrial and ventricular pacing wires. 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     His last follow up with Denise EL with Cardiothoracic surgery 9/10/24 he reported feeling a wire to both sides of his lower abdomen, intermittent chest discomfort/pain to left chest. Uncontrolled glucose with recent start on Jardiance but has not started therapy at the time of the appointment. Follow up with Dr. Conti for pacing wire check. CXR 10/26/24 with no acute findings.     11/2024, he notes that if he does \"to much of anything, I get chest pain which makes it hard to breath.\" This is different from his angina. He is trying to increase his walking routine for exercise. He denies any nocturnal symptoms. Leg swelling have resolved. He will elevated his legs when seated. Notes off/on numbness from vein harvesting. He did not complete cardiac rehab.     He was admitted 1/20-1/24/25 after he underwent coronary angiography and PCI. Patient has been experiencing crescendo angina and had a nuclear stress test which was abnormal for reversible ischemia he underwent coronary angiography by Dr. Blackman.  He was found to have significant stenosis of his saphenous vein graft to the right coronary artery and obtuse marginal branch.  His LIMA was patent but there was a very high-grade stenosis of LAD beyond LIMA if things were made to 
Encounters:   04/17/25 82.6 kg (182 lb)   04/03/25 81.3 kg (179 lb 3.7 oz)   03/06/25 81.2 kg (179 lb)     Constitutional: He is oriented to person, place, and time. He appears well-developed and well-nourished. In no acute distress.   Head: Normocephalic and atraumatic. Pupils equal and round.  Neck: Neck supple. No JVP or carotid bruit appreciated. No mass and no thyromegaly present. No lymphadenopathy present.  Cardiovascular: Normal rate. Normal heart sounds. Exam reveals no gallop and no friction rub. No murmur heard.  Pulmonary/Chest: Effort normal and breath sounds normal. No respiratory distress. He has no wheezes, rhonchi or rales.   Abdominal: Soft, non-tender. Bowel sounds are normal. He exhibits no organomegaly, mass or bruit.   Extremities: No edema, cyanosis, or clubbing. Pulses are 2+ radial/dorsalis pedis/posterior tibial/carotid bilaterally.  Neurological: No gross cranial nerve deficit. Coordination normal.   Skin: Skin is warm and dry. There is no rash or diaphoresis.   Psychiatric: He has a normal mood and affect. His speech is normal and behavior is normal.     Lab Review:    Lab Results   Component Value Date/Time    TRIG 306 07/15/2024 02:38 AM    HDL 49 07/15/2024 02:38 AM    LDLDIRECT 224 07/15/2024 02:38 AM      Lab Results   Component Value Date/Time    BUN 11 04/03/2025 04:32 AM    CREATININE 0.7 04/03/2025 04:32 AM       EKG Interpretation:   11/5/24 Sinus Rhythm, Anteroseptal infarct -age undetermined. -Negative T-waves -Lateral ischemia.   3/6/25    Image Review:     [facility] 7/15/24  Dominance: Right  Left Anterior Descending   Mid LAD lesion, 90% stenosed.      First Diagonal Branch   1st Diag lesion, 90% stenosed.      Left Circumflex      Second Obtuse Marginal Branch   2nd Mrg lesion, 100% stenosed. Lesion is the culprit lesion.      Right Coronary Artery   Mid RCA lesion, 75% stenosed. Lesion is the culprit lesion.      First Right Posterolateral Branch   1st RPL lesion, 70% stenosed.

## 2025-05-23 ENCOUNTER — PATIENT MESSAGE (OUTPATIENT)
Dept: CARDIOLOGY CLINIC | Age: 50
End: 2025-05-23

## 2025-05-23 RX ORDER — METOPROLOL SUCCINATE 25 MG/1
12.5 TABLET, EXTENDED RELEASE ORAL 2 TIMES DAILY
Qty: 30 TABLET | Refills: 3 | Status: SHIPPED | OUTPATIENT
Start: 2025-05-23

## 2025-05-23 NOTE — TELEPHONE ENCOUNTER
Requested Prescriptions     Pending Prescriptions Disp Refills    metoprolol succinate (TOPROL XL) 25 MG extended release tablet 30 tablet 3     Sig: Take 0.5 tablets by mouth in the morning and at bedtime        Last OV: 5/15/2025  Next OV: 8/19/2025  Last refill:7/23/2024

## 2025-08-18 RX ORDER — METOPROLOL SUCCINATE 25 MG/1
12.5 TABLET, EXTENDED RELEASE ORAL 2 TIMES DAILY
Qty: 90 TABLET | Refills: 1 | Status: SHIPPED | OUTPATIENT
Start: 2025-08-18

## (undated) DEVICE — CONNECTOR PERF L5 IN OD1 2 IN SAT HCT ST FEATURING B CARE 5

## (undated) DEVICE — TUBING INSUFFLATION SMK EVAC HI FLO SET PNEUMOCLEAR

## (undated) DEVICE — 3 ML SYRINGE LUER-LOCK TIP: Brand: MONOJECT

## (undated) DEVICE — STERILE POLYISOPRENE POWDER-FREE SURGICAL GLOVES WITH EMOLLIENT COATING: Brand: PROTEXIS

## (undated) DEVICE — TRAY VEN ACCS DIA9FR 3 LUMN ADV HF DEV

## (undated) DEVICE — SET CATH 20GA L1.75IN RAD ART POLYUR RADPQ W/ INTEGR

## (undated) DEVICE — SPONGE,PEANUT,XRAY,ST,SM,3/8",5/CARD: Brand: MEDLINE INDUSTRIES, INC.

## (undated) DEVICE — CONNECTOR PERF W0.25XH3/8IN BASE Y SHP REDUC W/O LUERLOCK

## (undated) DEVICE — DRAIN SURG 24FR BLAK SIL HUBLESS 4 CHANNELED RADPQ EXTN TB

## (undated) DEVICE — CANNULA PERF L15IN DIA29FR VEN 3 STG THN WALL DSGN W  VENT

## (undated) DEVICE — SUTURE VICRYL + SZ 2-0 L36IN ABSRB UD L36MM CT-1 1/2 CIR VCP945H

## (undated) DEVICE — PINNACLE INTRODUCER SHEATH: Brand: PINNACLE

## (undated) DEVICE — STERNUM BLADE, OFFSET (31.7 X 0.64 X 6.3MM)

## (undated) DEVICE — Device: Brand: MEDEX

## (undated) DEVICE — GUIDEWIRE VASC L150CM DIA0.035IN FLX END L7CM J 3MM PTFE

## (undated) DEVICE — GEL US 20GM NONIRRITATING OVERWRAPPED FILE PCH TRNSMIT

## (undated) DEVICE — SUTURE PERMAHAND SZ 2-0 L30IN NONABSORBABLE BLK SILK W/O A305H

## (undated) DEVICE — MARKER GRAFT CORONARY BYPASS DISTAL S/S DISP

## (undated) DEVICE — PERCLOSE™ PROSTYLE™ SUTURE-MEDIATED CLOSURE AND REPAIR SYSTEM: Brand: PERCLOSE™ PROSTYLE™

## (undated) DEVICE — CANNULA PERF L2IN BLNT TIP 3MM VES CLR RADPQ BODY FEM LUER D

## (undated) DEVICE — CATHETER INTVENT 5FR L135CM 0.014IN LO PROF GREATER TIP DSTL

## (undated) DEVICE — SUTURE NONABSORBABLE MONOFILAMENT 5-0 C-1 1X24 IN PROLENE 8725H

## (undated) DEVICE — SYRINGE ONLY,20ML LUER LOCK: Brand: MEDLINE INDUSTRIES, INC.

## (undated) DEVICE — HI-TORQUE PILOT 200 GUIDE WIRE .014 STRAIGHT TIP 3.0 CM X 190 CM: Brand: HI-TORQUE PILOT

## (undated) DEVICE — DRAIN SURG SGL COLL PT TB FOR ATS BG OASIS

## (undated) DEVICE — CATHETER 5FR CORDIS PIG 145DEG 110CM

## (undated) DEVICE — CATHETER GUID TURNPIKE 135CM DIA2.9X2.9X1.6FR 0.014IN

## (undated) DEVICE — SYSTEM ENDOSCP VES HARV W/ TOOL CANN SEAL SHT PRT BLNT TIP

## (undated) DEVICE — DRESSING WND SM W2.5XL4IN BRTH W/ CATH SECUREMENT AND

## (undated) DEVICE — CATHETER 5FR JR4 CORDIS 100CM

## (undated) DEVICE — SPONGE LAP W18XL18IN WHT COT 4 PLY FLD STRUNG RADPQ DISP ST 2 PER PACK

## (undated) DEVICE — SYRINGE, LUER LOCK, 5ML: Brand: MEDLINE

## (undated) DEVICE — SUTURE PERMAHAND SZ 2-0 L30IN NONABSORBABLE BLK SH L26MM C016D

## (undated) DEVICE — 260 CM J TIP WIRE .035

## (undated) DEVICE — SUTURE PERMAHAND SZ 0 L30IN NONABSORBABLE BLK L26MM SH 1/2 K834H

## (undated) DEVICE — CORONARY IMAGING CATHETER: Brand: OPTICROSS™ 6 HD

## (undated) DEVICE — EVERGRIP INSERT SET 61MM: Brand: FOGARTY EVERGRIP

## (undated) DEVICE — ACUMEN IQ SENSOR: Brand: ACUMEN IQ

## (undated) DEVICE — SUTURE PERMA HND SILK SZ 5-0 L30IN NONABSORBABLE BLK C-1 K890H

## (undated) DEVICE — SUTURE MONOCRYL + SZ 4-0 L27IN ABSRB UD L19MM PS-2 3/8 CIR MCP426H

## (undated) DEVICE — CLIP SM RED INTERN HMOCLP TITAN LIGATING

## (undated) DEVICE — BLADE OPHTH 180DEG CUT SURF BLU STR SHRP DBL BVL GRINDLESS

## (undated) DEVICE — LIQUIBAND RAPID ADHESIVE 36/CS 0.8ML: Brand: MEDLINE

## (undated) DEVICE — RESERVOIR AUTOTRANSFUSION 225/120 CC GS FILTERED XTRA

## (undated) DEVICE — NEEDLE HYPO 30GA L0.5IN BGE POLYPR HUB S STL REG BVL STR

## (undated) DEVICE — TEMP PACING WIRE: Brand: MYO/WIRE

## (undated) DEVICE — RETRACTOR SURG INSRT SUT HLD OCTOBASE

## (undated) DEVICE — BLADE ES L4IN INSUL EDGE

## (undated) DEVICE — GUIDEWIRE ANGIO 3CM STR TIP EXTRA SUPP CHOICE PT

## (undated) DEVICE — CATH BLLN ANGIO 2X12MM SC EUPHORA RX

## (undated) DEVICE — NDLCTR: FOAM/MAG 40CT 64/CS: Brand: MEDICAL ACTION INDUSTRIES

## (undated) DEVICE — ROTATING SURGICAL PUNCHES, 1 PER POUCH: Brand: A&E MEDICAL / ROTATING SURGICAL PUNCHES

## (undated) DEVICE — Device

## (undated) DEVICE — CANNULA ART 21FR L145IN VENT CONN SFT FLOW EXT

## (undated) DEVICE — Device: Brand: PROWATER

## (undated) DEVICE — SUTURE VICRYL + SZ 0 L27IN ABSRB UD CT-1 L36MM 1/2 CIR TAPR VCP260H

## (undated) DEVICE — CATH BLLN ANGIO 3X12MM NC EUPHORIA RX

## (undated) DEVICE — GLOVE SURG SZ 75 L12IN FNGR THK94MIL STD WHT LTX FREE

## (undated) DEVICE — SUTURE PROL SZ 7-0 L24IN NONABSORBABLE BLU L8MM BV175-6 3/8 8735H

## (undated) DEVICE — KIT CATH 5.2FR 100CM 145DEG ANGIO VASC DIAG JUDKINS R 4

## (undated) DEVICE — NEEDLE HYPO 18GA L1.5IN THN WALL PIVOTING SHLD BVL ORIENTED

## (undated) DEVICE — SUTURE PROL SZ 3-0 L36IN NONABSORBABLE BLU L17MM RB-1 1/2 8558H

## (undated) DEVICE — HI-TORQUE BALANCE HEAVYWEIGHT GUIDE WIRE W/HYDROCOAT .014 J TIP 4.5 CM X 300 CM: Brand: HI-TORQUE BALANCE HEAVYWEIGHT

## (undated) DEVICE — RUNTHROUGH NS EXTRA FLOPPY PTCA GUIDEWIRE: Brand: RUNTHROUGH

## (undated) DEVICE — CATHETER 5FR JL3.5 CORDIS 100CM

## (undated) DEVICE — KIT COMPL CK0289R4  BB9L99R8

## (undated) DEVICE — BLOWER COR ART L16.5CM PLAS SHFT MAL W/ MIST IV SET AXIUS

## (undated) DEVICE — CATHETER GUIDE AD 7FR L100CM COR PERIPH ORNG NYL PTFE XB L

## (undated) DEVICE — SUTURE VICRYL + SZ 1-0 L36IN ABSRB UD CTX 1/2 CIR TAPR PNT VCP977H

## (undated) DEVICE — HI-TORQUE WHISPER MS GUIDE WIRE .014 STRAIGHT TIP 3.0 CM X 190 CM: Brand: HI-TORQUE WHISPER

## (undated) DEVICE — SCANLAN® VASCU-STATT® II SINGLE-USE BULLDOG CLAMP W/FIRMER CLAMPING PRESS - MIDI ANGLED 45° (YELLOW), CLAMPING PRESSURE 75-80 G (2/STERILE PKG): Brand: SCANLAN® VASCU-STATT® II SINGLE-USE BULLDOG CLAMP W/FIRMER CLAMPING PRESS

## (undated) DEVICE — CABG: Brand: MEDLINE INDUSTRIES, INC.

## (undated) DEVICE — DRESSING GERM DIA1IN CNTR H DIA7MM BLU CHG ANTIMIC PROTCT

## (undated) DEVICE — SET PERF L15IN BLU CLMP MULT FEM LUER ON SGL INLET LEG DLP

## (undated) DEVICE — ADAPTER LAB  23GA INTMED LUER STUB

## (undated) DEVICE — SUTURE PERMA-HAND SZ 4-0 L144IN NONABSORBABLE BLK LIGAPAK LA53G

## (undated) DEVICE — SUPPORT WRST AD W3.5XL9IN DIA14.5IN ART SFT ADJ HK AND LOOP

## (undated) DEVICE — PRESSURE MONITORING SET: Brand: TRUWAVE, VAMP PLUS

## (undated) DEVICE — 12 FOOT DISPOSABLE EXTENSION CABLE WITH SAFE CONNECT / SCREW-DOWN

## (undated) DEVICE — CATHETER GUID XB 3.5 6 FRX100 CM VISTA BRT TIP

## (undated) DEVICE — CATH BLLN ANGIO 3.75X8MM NC EUPHORIA RX

## (undated) DEVICE — GOWN SIRUS NONREIN LG W/TWL: Brand: MEDLINE INDUSTRIES, INC.

## (undated) DEVICE — CONNECTOR PERF W3/8XH0.25XL0.25IN BASE UNEQUAL Y SHP W/O

## (undated) DEVICE — CUSTOM PK 10A99R2 RED VENT

## (undated) DEVICE — TR BAND RADIAL ARTERY COMPRESSION DEVICE: Brand: TR BAND

## (undated) DEVICE — SUTURE VICRYL + SZ 3-0 L27IN ABSRB UD L26MM SH 1/2 CIR VCP416H

## (undated) DEVICE — GAUZE,SPONGE,8"X4",12PLY,XRAY,STRL,LF: Brand: MEDLINE

## (undated) DEVICE — BAG BLD TRNSF 1 CPLR IV ST 600ML TERUFLEX

## (undated) DEVICE — CONTAINER STOR SURG SLUSH

## (undated) DEVICE — GLIDESHEATH SLENDER STAINLESS STEEL KIT: Brand: GLIDESHEATH SLENDER

## (undated) DEVICE — GLOVE SURG SZ 6 CRM LTX FREE POLYISOPRENE POLYMER BEAD ANTI

## (undated) DEVICE — SET TRNQT 12FR L7.5IN BRNZ TB SNR TOURNIKWIK

## (undated) DEVICE — SUMP INTCARD SUCT AD 20FR PERICARD MAYO STYL FLX VERSATILE

## (undated) DEVICE — RUNTHROUGH NS IZANAI PTCA GUIDEWIRE: Brand: RUNTHROUGH

## (undated) DEVICE — SUTURE NONABSORBABLE MONOFILAMENT 6-0 C-1 1X30 IN PROLENE 8706H

## (undated) DEVICE — CANNULA PERF 15FR L12.5IN RG STPCOCK WIREWOUND BODY

## (undated) DEVICE — CATH BLLN ANGIO 2X15MM SC EUPHORA RX

## (undated) DEVICE — BLADE CLIPPER GEN PURP NS

## (undated) DEVICE — TREK CORONARY DILATATION CATHETER 2.50 MM X 20 MM / RAPID-EXCHANGE: Brand: TREK

## (undated) DEVICE — CANNULA PERF 7FR L5.5IN AORT ROOT RADPQ STD TIP W/ VENT LN

## (undated) DEVICE — 3M™ TEGADERM™ TRANSPARENT FILM DRESSING FRAME STYLE, 1626W, 4 IN X 4-3/4 IN (10 CM X 12 CM), 50/CT 4CT/CASE: Brand: 3M™ TEGADERM™

## (undated) DEVICE — GUIDE SURG TISS

## (undated) DEVICE — CATHETER THRMDIL 7.5FR L110CM PROXIMAL/DISTAL PRT L30CM STD

## (undated) DEVICE — HEMOCONCENTRATOR PERF MINNTECH HEMOCOR

## (undated) DEVICE — SUTURE NONABSORBABLE MONOFILAMENT 4-0 RB-1 36 IN BLU PROLENE 8557H

## (undated) DEVICE — DRESSING CATHETER POS W BTRFLY TAPE PTCH ULT I SYS SORBAVIEW

## (undated) DEVICE — HI-TORQUE WHISPER MS GUIDE WIRE .014 J TIP 3.0 CM X 190 CM: Brand: HI-TORQUE WHISPER

## (undated) DEVICE — CATHETER DIAG AD 5FR L100CM COR GRY HYDRPHLC NYL IM W/O

## (undated) DEVICE — Device: Brand: FIELDER XT

## (undated) DEVICE — EXTENSION SET, 2 INJECTION SITES, MALE LUER LOCK ADAPTER WITH RETRACTABLE COLLAR: Brand: INTERLINK

## (undated) DEVICE — SUTURE PERMA-HAND SZ 2 L60IN NONABSORBABLE BLK SILK BRAID SA8H

## (undated) DEVICE — CATHETER ANGIO 5FR L100CM ID0.045IN LCB CRV ROBUST SHFT

## (undated) DEVICE — BLOOD TRANSFUSION FILTER: Brand: HAEMONETICS

## (undated) DEVICE — EVERGRIP INSERT SET 86MM: Brand: FOGARTY EVERGRIP

## (undated) DEVICE — CATHETER GUID 7FR DIA0.081IN SHFT NYL BKUP SUPP L EBU 3.5

## (undated) DEVICE — LIGHT HANDLE: Brand: DEVON

## (undated) DEVICE — CORDIS XB3 CATHETER 100CM

## (undated) DEVICE — 3M™ STERI-DRAPE™ INSTRUMENT POUCH 1018: Brand: STERI-DRAPE™

## (undated) DEVICE — SET ADMIN PRIMING 67ML L105IN NVENT 180UM FLTR 3 RLER CLMP

## (undated) DEVICE — BLANKET THER PED W25XL33IN HYPER/HYPOTHERMIA DISP